# Patient Record
Sex: FEMALE | Race: WHITE | NOT HISPANIC OR LATINO
[De-identification: names, ages, dates, MRNs, and addresses within clinical notes are randomized per-mention and may not be internally consistent; named-entity substitution may affect disease eponyms.]

---

## 2017-03-31 ENCOUNTER — APPOINTMENT (OUTPATIENT)
Dept: GASTROENTEROLOGY | Facility: CLINIC | Age: 67
End: 2017-03-31

## 2017-03-31 VITALS
HEIGHT: 65 IN | DIASTOLIC BLOOD PRESSURE: 60 MMHG | OXYGEN SATURATION: 98 % | HEART RATE: 67 BPM | SYSTOLIC BLOOD PRESSURE: 110 MMHG | WEIGHT: 140 LBS | RESPIRATION RATE: 14 BRPM | BODY MASS INDEX: 23.32 KG/M2 | TEMPERATURE: 97.7 F

## 2017-08-09 ENCOUNTER — APPOINTMENT (OUTPATIENT)
Dept: GASTROENTEROLOGY | Facility: CLINIC | Age: 67
End: 2017-08-09
Payer: COMMERCIAL

## 2017-08-09 VITALS
OXYGEN SATURATION: 98 % | WEIGHT: 142 LBS | RESPIRATION RATE: 14 BRPM | HEIGHT: 65 IN | BODY MASS INDEX: 23.66 KG/M2 | SYSTOLIC BLOOD PRESSURE: 122 MMHG | HEART RATE: 68 BPM | DIASTOLIC BLOOD PRESSURE: 90 MMHG | TEMPERATURE: 98.4 F

## 2017-08-09 PROCEDURE — 36415 COLL VENOUS BLD VENIPUNCTURE: CPT

## 2017-08-09 PROCEDURE — 99214 OFFICE O/P EST MOD 30 MIN: CPT | Mod: 25

## 2017-08-11 LAB
25(OH)D3 SERPL-MCNC: 33.7 NG/ML
ALBUMIN SERPL ELPH-MCNC: 4.3 G/DL
ALP BLD-CCNC: 45 U/L
ALT SERPL-CCNC: 34 U/L
ANION GAP SERPL CALC-SCNC: 12 MMOL/L
AST SERPL-CCNC: 31 U/L
BASOPHILS # BLD AUTO: 0.04 K/UL
BASOPHILS NFR BLD AUTO: 0.6 %
BILIRUB SERPL-MCNC: 1 MG/DL
BUN SERPL-MCNC: 16 MG/DL
CALCIUM SERPL-MCNC: 9.4 MG/DL
CHLORIDE SERPL-SCNC: 99 MMOL/L
CO2 SERPL-SCNC: 29 MMOL/L
CREAT SERPL-MCNC: 0.98 MG/DL
CRP SERPL-MCNC: <0.2 MG/DL
ENDOMYSIUM IGA SER QL: NORMAL
ENDOMYSIUM IGA TITR SER: NORMAL
EOSINOPHIL # BLD AUTO: 0.15 K/UL
EOSINOPHIL NFR BLD AUTO: 2.4 %
GLIADIN IGA SER QL: <5 UNITS
GLIADIN IGG SER QL: <5 UNITS
GLIADIN PEPTIDE IGA SER-ACNC: NEGATIVE
GLIADIN PEPTIDE IGG SER-ACNC: NEGATIVE
GLUCOSE SERPL-MCNC: 100 MG/DL
HCT VFR BLD CALC: 39.7 %
HGB BLD-MCNC: 13.1 G/DL
IGA SER QL IEP: 60 MG/DL
IMM GRANULOCYTES NFR BLD AUTO: 0.2 %
LYMPHOCYTES # BLD AUTO: 2.16 K/UL
LYMPHOCYTES NFR BLD AUTO: 34.8 %
MAN DIFF?: NORMAL
MCHC RBC-ENTMCNC: 31.6 PG
MCHC RBC-ENTMCNC: 33 GM/DL
MCV RBC AUTO: 95.9 FL
MONOCYTES # BLD AUTO: 0.4 K/UL
MONOCYTES NFR BLD AUTO: 6.5 %
NEUTROPHILS # BLD AUTO: 3.44 K/UL
NEUTROPHILS NFR BLD AUTO: 55.5 %
PLATELET # BLD AUTO: 343 K/UL
POTASSIUM SERPL-SCNC: 5 MMOL/L
PROT SERPL-MCNC: 7.6 G/DL
RBC # BLD: 4.14 M/UL
RBC # FLD: 14.6 %
SODIUM SERPL-SCNC: 140 MMOL/L
T4 FREE SERPL-MCNC: 0.9 NG/DL
TRYPSINOGEN SERPL-MCNC: 626 NG/ML
TSH SERPL-ACNC: 4.43 UIU/ML
TTG IGA SER IA-ACNC: <5 UNITS
TTG IGA SER-ACNC: NEGATIVE
VIT B12 SERPL-MCNC: >2000 PG/ML
WBC # FLD AUTO: 6.2 K/UL

## 2017-10-20 ENCOUNTER — OTHER (OUTPATIENT)
Age: 67
End: 2017-10-20

## 2017-10-20 ENCOUNTER — APPOINTMENT (OUTPATIENT)
Dept: RADIOLOGY | Facility: IMAGING CENTER | Age: 67
End: 2017-10-20
Payer: COMMERCIAL

## 2017-10-20 ENCOUNTER — APPOINTMENT (OUTPATIENT)
Dept: MAMMOGRAPHY | Facility: IMAGING CENTER | Age: 67
End: 2017-10-20
Payer: COMMERCIAL

## 2017-10-20 ENCOUNTER — OUTPATIENT (OUTPATIENT)
Dept: OUTPATIENT SERVICES | Facility: HOSPITAL | Age: 67
LOS: 1 days | End: 2017-10-20
Payer: COMMERCIAL

## 2017-10-20 DIAGNOSIS — Z00.8 ENCOUNTER FOR OTHER GENERAL EXAMINATION: ICD-10-CM

## 2017-10-20 PROCEDURE — 77080 DXA BONE DENSITY AXIAL: CPT | Mod: 26

## 2017-10-20 PROCEDURE — 77067 SCR MAMMO BI INCL CAD: CPT

## 2017-10-20 PROCEDURE — 77063 BREAST TOMOSYNTHESIS BI: CPT

## 2017-10-20 PROCEDURE — 77080 DXA BONE DENSITY AXIAL: CPT

## 2017-10-20 PROCEDURE — 77063 BREAST TOMOSYNTHESIS BI: CPT | Mod: 26

## 2017-10-20 PROCEDURE — G0202: CPT | Mod: 26

## 2017-10-27 ENCOUNTER — OTHER (OUTPATIENT)
Age: 67
End: 2017-10-27

## 2017-10-27 ENCOUNTER — APPOINTMENT (OUTPATIENT)
Dept: MAMMOGRAPHY | Facility: IMAGING CENTER | Age: 67
End: 2017-10-27
Payer: COMMERCIAL

## 2017-10-27 ENCOUNTER — OUTPATIENT (OUTPATIENT)
Dept: OUTPATIENT SERVICES | Facility: HOSPITAL | Age: 67
LOS: 1 days | End: 2017-10-27
Payer: COMMERCIAL

## 2017-10-27 DIAGNOSIS — Z00.8 ENCOUNTER FOR OTHER GENERAL EXAMINATION: ICD-10-CM

## 2017-10-27 PROCEDURE — G0206: CPT | Mod: 26,RT

## 2017-10-27 PROCEDURE — G0279: CPT

## 2017-10-27 PROCEDURE — 77065 DX MAMMO INCL CAD UNI: CPT

## 2017-10-27 PROCEDURE — G0279: CPT | Mod: 26

## 2017-11-10 ENCOUNTER — OUTPATIENT (OUTPATIENT)
Dept: OUTPATIENT SERVICES | Facility: HOSPITAL | Age: 67
LOS: 1 days | End: 2017-11-10
Payer: COMMERCIAL

## 2017-11-10 ENCOUNTER — RESULT REVIEW (OUTPATIENT)
Age: 67
End: 2017-11-10

## 2017-11-10 ENCOUNTER — APPOINTMENT (OUTPATIENT)
Dept: MAMMOGRAPHY | Facility: IMAGING CENTER | Age: 67
End: 2017-11-10
Payer: COMMERCIAL

## 2017-11-10 DIAGNOSIS — R92.1 MAMMOGRAPHIC CALCIFICATION FOUND ON DIAGNOSTIC IMAGING OF BREAST: ICD-10-CM

## 2017-11-10 PROCEDURE — 77065 DX MAMMO INCL CAD UNI: CPT

## 2017-11-10 PROCEDURE — 19081 BX BREAST 1ST LESION STRTCTC: CPT | Mod: RT

## 2017-11-10 PROCEDURE — 88305 TISSUE EXAM BY PATHOLOGIST: CPT

## 2017-11-10 PROCEDURE — A4648: CPT

## 2017-11-10 PROCEDURE — 88360 TUMOR IMMUNOHISTOCHEM/MANUAL: CPT

## 2017-11-10 PROCEDURE — 88305 TISSUE EXAM BY PATHOLOGIST: CPT | Mod: 26

## 2017-11-10 PROCEDURE — 88360 TUMOR IMMUNOHISTOCHEM/MANUAL: CPT | Mod: 26

## 2017-11-10 PROCEDURE — G0206: CPT | Mod: 26,RT

## 2017-11-10 PROCEDURE — 19081 BX BREAST 1ST LESION STRTCTC: CPT

## 2017-11-13 LAB — SURGICAL PATHOLOGY STUDY: SIGNIFICANT CHANGE UP

## 2017-11-21 ENCOUNTER — APPOINTMENT (OUTPATIENT)
Dept: MRI IMAGING | Facility: IMAGING CENTER | Age: 67
End: 2017-11-21
Payer: COMMERCIAL

## 2017-11-21 ENCOUNTER — OUTPATIENT (OUTPATIENT)
Dept: OUTPATIENT SERVICES | Facility: HOSPITAL | Age: 67
LOS: 1 days | End: 2017-11-21
Payer: COMMERCIAL

## 2017-11-21 DIAGNOSIS — Z00.8 ENCOUNTER FOR OTHER GENERAL EXAMINATION: ICD-10-CM

## 2017-11-21 PROCEDURE — A9585: CPT

## 2017-11-21 PROCEDURE — 0159T: CPT | Mod: 26

## 2017-11-21 PROCEDURE — 77059 MRI BREAST BILATERAL: CPT | Mod: 26

## 2017-11-21 PROCEDURE — C8908: CPT

## 2017-11-21 PROCEDURE — C8937: CPT

## 2017-11-22 ENCOUNTER — ASOB RESULT (OUTPATIENT)
Age: 67
End: 2017-11-22

## 2017-11-22 ENCOUNTER — APPOINTMENT (OUTPATIENT)
Dept: OBGYN | Facility: CLINIC | Age: 67
End: 2017-11-22
Payer: COMMERCIAL

## 2017-11-22 VITALS
SYSTOLIC BLOOD PRESSURE: 123 MMHG | WEIGHT: 135 LBS | HEIGHT: 65 IN | DIASTOLIC BLOOD PRESSURE: 82 MMHG | HEART RATE: 80 BPM | BODY MASS INDEX: 22.49 KG/M2

## 2017-11-22 PROCEDURE — 76830 TRANSVAGINAL US NON-OB: CPT

## 2017-11-22 PROCEDURE — 99397 PER PM REEVAL EST PAT 65+ YR: CPT

## 2017-11-28 ENCOUNTER — APPOINTMENT (OUTPATIENT)
Dept: CARDIOLOGY | Facility: CLINIC | Age: 67
End: 2017-11-28
Payer: COMMERCIAL

## 2017-11-28 ENCOUNTER — APPOINTMENT (OUTPATIENT)
Dept: MAMMOGRAPHY | Facility: IMAGING CENTER | Age: 67
End: 2017-11-28
Payer: COMMERCIAL

## 2017-11-28 ENCOUNTER — OUTPATIENT (OUTPATIENT)
Dept: OUTPATIENT SERVICES | Facility: HOSPITAL | Age: 67
LOS: 1 days | End: 2017-11-28
Payer: COMMERCIAL

## 2017-11-28 VITALS
TEMPERATURE: 98 F | DIASTOLIC BLOOD PRESSURE: 82 MMHG | HEIGHT: 64.5 IN | WEIGHT: 134.04 LBS | HEART RATE: 75 BPM | SYSTOLIC BLOOD PRESSURE: 118 MMHG | RESPIRATION RATE: 16 BRPM

## 2017-11-28 VITALS
DIASTOLIC BLOOD PRESSURE: 72 MMHG | OXYGEN SATURATION: 96 % | BODY MASS INDEX: 22.63 KG/M2 | SYSTOLIC BLOOD PRESSURE: 123 MMHG | HEART RATE: 91 BPM | WEIGHT: 136 LBS

## 2017-11-28 DIAGNOSIS — J45.998 OTHER ASTHMA: ICD-10-CM

## 2017-11-28 DIAGNOSIS — C50.419 MALIGNANT NEOPLASM OF UPPER-OUTER QUADRANT OF UNSPECIFIED FEMALE BREAST: ICD-10-CM

## 2017-11-28 DIAGNOSIS — Z12.11 ENCOUNTER FOR SCREENING FOR MALIGNANT NEOPLASM OF COLON: ICD-10-CM

## 2017-11-28 DIAGNOSIS — Z80.8 FAMILY HISTORY OF MALIGNANT NEOPLASM OF OTHER ORGANS OR SYSTEMS: ICD-10-CM

## 2017-11-28 DIAGNOSIS — I47.2 VENTRICULAR TACHYCARDIA: ICD-10-CM

## 2017-11-28 DIAGNOSIS — Z87.898 PERSONAL HISTORY OF OTHER SPECIFIED CONDITIONS: ICD-10-CM

## 2017-11-28 DIAGNOSIS — J00 ACUTE NASOPHARYNGITIS [COMMON COLD]: ICD-10-CM

## 2017-11-28 DIAGNOSIS — Z90.49 ACQUIRED ABSENCE OF OTHER SPECIFIED PARTS OF DIGESTIVE TRACT: Chronic | ICD-10-CM

## 2017-11-28 DIAGNOSIS — E03.9 HYPOTHYROIDISM, UNSPECIFIED: ICD-10-CM

## 2017-11-28 DIAGNOSIS — Z87.440 PERSONAL HISTORY OF URINARY (TRACT) INFECTIONS: ICD-10-CM

## 2017-11-28 DIAGNOSIS — R92.1 MAMMOGRAPHIC CALCIFICATION FOUND ON DIAGNOSTIC IMAGING OF BREAST: ICD-10-CM

## 2017-11-28 LAB
BUN SERPL-MCNC: 15 MG/DL — SIGNIFICANT CHANGE UP (ref 7–23)
CALCIUM SERPL-MCNC: 8.9 MG/DL — SIGNIFICANT CHANGE UP (ref 8.4–10.5)
CHLORIDE SERPL-SCNC: 104 MMOL/L — SIGNIFICANT CHANGE UP (ref 98–107)
CO2 SERPL-SCNC: 27 MMOL/L — SIGNIFICANT CHANGE UP (ref 22–31)
CREAT SERPL-MCNC: 0.92 MG/DL — SIGNIFICANT CHANGE UP (ref 0.5–1.3)
GLUCOSE SERPL-MCNC: 91 MG/DL — SIGNIFICANT CHANGE UP (ref 70–99)
HCT VFR BLD CALC: 40.2 % — SIGNIFICANT CHANGE UP (ref 34.5–45)
HGB BLD-MCNC: 13.3 G/DL — SIGNIFICANT CHANGE UP (ref 11.5–15.5)
MCHC RBC-ENTMCNC: 31.3 PG — SIGNIFICANT CHANGE UP (ref 27–34)
MCHC RBC-ENTMCNC: 33.1 % — SIGNIFICANT CHANGE UP (ref 32–36)
MCV RBC AUTO: 94.6 FL — SIGNIFICANT CHANGE UP (ref 80–100)
NRBC # FLD: 0 — SIGNIFICANT CHANGE UP
PLATELET # BLD AUTO: 296 K/UL — SIGNIFICANT CHANGE UP (ref 150–400)
PMV BLD: 10.6 FL — SIGNIFICANT CHANGE UP (ref 7–13)
POTASSIUM SERPL-MCNC: 4.5 MMOL/L — SIGNIFICANT CHANGE UP (ref 3.5–5.3)
POTASSIUM SERPL-SCNC: 4.5 MMOL/L — SIGNIFICANT CHANGE UP (ref 3.5–5.3)
RBC # BLD: 4.25 M/UL — SIGNIFICANT CHANGE UP (ref 3.8–5.2)
RBC # FLD: 13.2 % — SIGNIFICANT CHANGE UP (ref 10.3–14.5)
SODIUM SERPL-SCNC: 145 MMOL/L — SIGNIFICANT CHANGE UP (ref 135–145)
WBC # BLD: 8.19 K/UL — SIGNIFICANT CHANGE UP (ref 3.8–10.5)
WBC # FLD AUTO: 8.19 K/UL — SIGNIFICANT CHANGE UP (ref 3.8–10.5)

## 2017-11-28 PROCEDURE — 19281 PERQ DEVICE BREAST 1ST IMAG: CPT

## 2017-11-28 PROCEDURE — C1739: CPT

## 2017-11-28 PROCEDURE — 99214 OFFICE O/P EST MOD 30 MIN: CPT

## 2017-11-28 PROCEDURE — 19281 PERQ DEVICE BREAST 1ST IMAG: CPT | Mod: RT

## 2017-11-28 PROCEDURE — 93010 ELECTROCARDIOGRAM REPORT: CPT

## 2017-11-28 RX ORDER — NEOMYCIN SULFATE, POLYMYXIN B SULFATE AND DEXAMETHASONE 3.5; 10000; 1 MG/ML; [USP'U]/ML; MG/ML
3.5-10000-0.1 SUSPENSION OPHTHALMIC
Qty: 5 | Refills: 0 | Status: COMPLETED | COMMUNITY
Start: 2017-11-21

## 2017-11-28 RX ORDER — CHOLESTYRAMINE 4 G/9G
4 POWDER, FOR SUSPENSION ORAL
Qty: 30 | Refills: 1 | Status: DISCONTINUED | COMMUNITY
Start: 2017-03-31 | End: 2017-11-28

## 2017-11-28 RX ORDER — OFLOXACIN 3 MG/ML
0.3 SOLUTION/ DROPS OPHTHALMIC
Qty: 5 | Refills: 0 | Status: COMPLETED | COMMUNITY
Start: 2017-10-27

## 2017-11-28 NOTE — H&P PST ADULT - RS GEN PE MLT RESP DETAILS PC
clear to auscultation bilaterally/no intercostal retractions/no chest wall tenderness/respirations non-labored/airway patent/breath sounds equal/good air movement

## 2017-11-28 NOTE — H&P PST ADULT - NSANTHOSAYNRD_GEN_A_CORE
No. MAEGAN screening performed.  STOP BANG Legend: 0-2 = LOW Risk; 3-4 = INTERMEDIATE Risk; 5-8 = HIGH Risk

## 2017-11-28 NOTE — H&P PST ADULT - PMH
Angiomyolipoma  of Kidney  Arrhythmia    Cholelithiasis    Eye disorder  clooged eye duct of left eye  h/o Fibroid Uterus    h/o herniated disc Lumbar    h/o Ventricular Tachycardia 2002    Hypothyroidism  hashimoto's thyroiditis  Mild asthma, unspecified whether complicated, unspecified whether persistent  only with cold symptoms  Pneumonia  8 years ago  Thyroid Nodule Angiomyolipoma  of Kidney  Arrhythmia    Cholelithiasis    Eye disorder  clogged eye duct of left eye  h/o Fibroid Uterus    h/o herniated disc Lumbar    h/o Ventricular Tachycardia 2002    Hypothyroidism  hashimoto's thyroiditis  Malignant neoplasm of upper-outer quadrant of breast  right  Mild asthma, unspecified whether complicated, unspecified whether persistent  only with cold symptoms  Pneumonia  8 years ago  Thyroid Nodule

## 2017-11-28 NOTE — H&P PST ADULT - OTHER CARE PROVIDERS
Endocrinologist Dr. Chanda Cantor [phone] Endocrinologist Dr. Chanda Cantor [phone] Cardiologist Endocrinologist Dr. Chanda Cantor [phone] Cardiologist Dr. Aleks Rocha [phone]

## 2017-11-28 NOTE — H&P PST ADULT - PROBLEM SELECTOR PLAN 4
Pt states for the last 2 days she feels congested and not well. Informed she needs to see her PCP again for assessment and possible treatment before surgery.

## 2017-11-28 NOTE — H&P PST ADULT - ANESTHESIA, PREVIOUS REACTION, PROFILE
ventricular Tachycardia while in recovery in 2002 after hysterectomy at [facility]. denies Fhx of anesthesia complications/heart complications/nausea/vomiting

## 2017-11-28 NOTE — H&P PST ADULT - RS GEN HX ROS MEA POS PC
x 2 days. Pt reports recent chest congestion and feels like she is coming down with a cold./cough x 2 days. Pt also reports recent chest congestion and feels like she is coming down with a cold./cough

## 2017-11-28 NOTE — H&P PST ADULT - FAMILY HISTORY
Mother  Still living? Unknown  Family history of colon cancer in mother, Age at diagnosis: Age Unknown     Sibling  Still living? Unknown  Family history of malignant neoplasm of endometrium, Age at diagnosis: Age Unknown  Family history of prostate cancer, Age at diagnosis: Age Unknown  Family history of thyroid cancer, Age at diagnosis: Age Unknown

## 2017-11-28 NOTE — H&P PST ADULT - NEGATIVE BREAST SYMPTOMS
no breast tenderness L/no breast tenderness R/no breast lump L/no nipple discharge L/no nipple discharge R no nipple discharge L/no breast lump L/no nipple discharge R/no breast tenderness L

## 2017-11-28 NOTE — H&P PST ADULT - RESPIRATORY AND THORAX COMMENTS
h/o mild Asthma. denies recent exacerbation. Last use of Proventil inhaler was 1 night ago for congestion h/o mild Asthma. denies recent exacerbation. Last use of Proventil inhaler was 1 night ago for chest congestion

## 2017-11-28 NOTE — H&P PST ADULT - MUSCULOSKELETAL
details… detailed exam ROM intact/normal strength/no joint erythema/no calf tenderness/no joint swelling

## 2017-11-28 NOTE — H&P PST ADULT - PROBLEM SELECTOR PLAN 1
Scheduled for Right MAMM-SAVISCOUT Lumpectomy on 12/1/2107.   Pre op instructions given, pt verbalized understanding   Chlorhexidine wash and GI prophylaxis provided   Pt states she was seen by her PCP on 11/24 for medical evaluation preop  Copy of evaluation note requested

## 2017-11-28 NOTE — H&P PST ADULT - NEGATIVE GENERAL SYMPTOMS
no sweating/no weight loss/no polyphagia/no polydipsia/no fever/no chills/no anorexia/no weight gain/no polyuria/no malaise

## 2017-11-28 NOTE — H&P PST ADULT - NEGATIVE NEUROLOGICAL SYMPTOMS
no vertigo/no difficulty walking/no loss of consciousness/no generalized seizures/no loss of sensation/no confusion/no tremors/no focal seizures/no syncope/no transient paralysis/no weakness/no paresthesias/no headache/no hemiparesis/no facial palsy

## 2017-11-28 NOTE — H&P PST ADULT - LYMPHATIC
posterior cervical L/anterior cervical L/posterior cervical R/supraclavicular L/anterior cervical R/axillary L/axillary R/supraclavicular R

## 2017-11-28 NOTE — H&P PST ADULT - PROBLEM SELECTOR PLAN 3
h/o Ventricular tachycardia in 2002 while in recovery post hysterectomy.   Informed PST Anesthesiologist Dr. Augustin of pt's history of V-Tach and occasional arrhythmia's  Cardiac evaluation requested preop. Pt said she will call her Cardiologist today for an appt h/o Ventricular tachycardia in 2002 while in recovery post hysterectomy.   Informed PST Anesthesiologist Dr. Augustin of pt's history of V-Tach and occasional arrhythmia's  Cardiac evaluation requested preop.   Pt has an appt today at 3pm to see her cardiologist

## 2017-11-28 NOTE — H&P PST ADULT - HISTORY OF PRESENT ILLNESS
68 y/o female with PMH of Hashimoto's Thyroiditis on Synthroid presents to PST for preoperative evaluation with diagnosis of malignant neoplasm of upper-outer quadrant of right breast. Pt reports she was diagnosed with DCIS of the right breast after her annual mammogram and biopsy. Scheduled for Right MAMM-SAVISCOUT Lumpectomy on 12/1/2107.

## 2017-12-15 ENCOUNTER — OUTPATIENT (OUTPATIENT)
Dept: OUTPATIENT SERVICES | Facility: HOSPITAL | Age: 67
LOS: 1 days | Discharge: HOME | End: 2017-12-15
Payer: COMMERCIAL

## 2017-12-15 ENCOUNTER — RESULT REVIEW (OUTPATIENT)
Age: 67
End: 2017-12-15

## 2017-12-15 VITALS
SYSTOLIC BLOOD PRESSURE: 131 MMHG | HEART RATE: 73 BPM | RESPIRATION RATE: 18 BRPM | TEMPERATURE: 98 F | DIASTOLIC BLOOD PRESSURE: 76 MMHG | WEIGHT: 134.04 LBS | HEIGHT: 64.5 IN | OXYGEN SATURATION: 98 %

## 2017-12-15 VITALS
HEART RATE: 72 BPM | SYSTOLIC BLOOD PRESSURE: 123 MMHG | DIASTOLIC BLOOD PRESSURE: 76 MMHG | OXYGEN SATURATION: 96 % | RESPIRATION RATE: 16 BRPM

## 2017-12-15 DIAGNOSIS — Z90.49 ACQUIRED ABSENCE OF OTHER SPECIFIED PARTS OF DIGESTIVE TRACT: Chronic | ICD-10-CM

## 2017-12-15 PROCEDURE — 88305 TISSUE EXAM BY PATHOLOGIST: CPT | Mod: 26

## 2017-12-15 PROCEDURE — 76098 X-RAY EXAM SURGICAL SPECIMEN: CPT | Mod: 26,GC

## 2017-12-15 RX ORDER — ACETAMINOPHEN 500 MG
650 TABLET ORAL EVERY 6 HOURS
Qty: 0 | Refills: 0 | Status: DISCONTINUED | OUTPATIENT
Start: 2017-12-15 | End: 2017-12-30

## 2017-12-15 RX ORDER — ACETAMINOPHEN 500 MG
2 TABLET ORAL
Qty: 0 | Refills: 0 | COMMUNITY
Start: 2017-12-15

## 2017-12-15 RX ORDER — SODIUM CHLORIDE 9 MG/ML
1000 INJECTION, SOLUTION INTRAVENOUS
Qty: 0 | Refills: 0 | Status: DISCONTINUED | OUTPATIENT
Start: 2017-12-15 | End: 2017-12-30

## 2017-12-15 RX ORDER — HYDROMORPHONE HYDROCHLORIDE 2 MG/ML
1 INJECTION INTRAMUSCULAR; INTRAVENOUS; SUBCUTANEOUS ONCE
Qty: 0 | Refills: 0 | Status: DISCONTINUED | OUTPATIENT
Start: 2017-12-15 | End: 2017-12-15

## 2017-12-15 RX ORDER — HYDROMORPHONE HYDROCHLORIDE 2 MG/ML
1 INJECTION INTRAMUSCULAR; INTRAVENOUS; SUBCUTANEOUS
Qty: 9 | Refills: 0 | OUTPATIENT
Start: 2017-12-15 | End: 2017-12-17

## 2017-12-15 RX ADMIN — HYDROMORPHONE HYDROCHLORIDE 1 MILLIGRAM(S): 2 INJECTION INTRAMUSCULAR; INTRAVENOUS; SUBCUTANEOUS at 14:55

## 2017-12-15 RX ADMIN — SODIUM CHLORIDE 30 MILLILITER(S): 9 INJECTION, SOLUTION INTRAVENOUS at 14:30

## 2017-12-15 NOTE — ASU DISCHARGE PLAN (ADULT/PEDIATRIC). - ASU FOLLOWUP
call  PACU  [phone] ( open  24 hour  and weekend ) , [phone] ( open 6AM to 11 PM  closed weekend)/ABIMAEL ASU (Adult):

## 2017-12-15 NOTE — ASU DISCHARGE PLAN (ADULT/PEDIATRIC). - ITEMS TO FOLLOWUP WITH YOUR PHYSICIAN'S
Please follow up with Dr. Garrido in 2 weeks for a post-operative check. You may call [phone] to schedule an appointment.

## 2017-12-15 NOTE — ASU DISCHARGE PLAN (ADULT/PEDIATRIC). - MEDICATION SUMMARY - MEDICATIONS TO TAKE
I will START or STAY ON the medications listed below when I get home from the hospital:    marylou eliud Dex  -- 1 drop(s) by mouth 2 times a day  -- Indication: For home medication    acetaminophen 325 mg oral tablet  -- 2 tab(s) by mouth every 6 hours, As needed, Mild Pain (1 - 3)  -- Indication: For mild to moderate pain    Dilaudid 2 mg oral tablet  -- 1 tab(s) by mouth every 8 hours MDD:2  -- Caution federal law prohibits the transfer of this drug to any person other  than the person for whom it was prescribed.  May cause drowsiness.  Alcohol may intensify this effect.  Use care when operating dangerous machinery.  This prescription cannot be refilled.  Using more of this medication than prescribed may cause serious breathing problems.    -- Indication: For severe pain    Proventil HFA 90 mcg/inh inhalation aerosol  -- when sick  -- Indication: For home medication    Symbicort 160 mcg-4.5 mcg/inh inhalation aerosol  -- 2 puff(s) inhaled 2 times a day  -- Indication: For home medication    Synthroid 25 mcg (0.025 mg) oral tablet  -- 1 tab(s) by mouth once a day in AM. Only on Mon,Tues, Wed, Friday, Sat    Thursday and sunday's pt takes three quaters of her pill  -- Indication: For home medication

## 2017-12-15 NOTE — BRIEF OPERATIVE NOTE - OPERATION/FINDINGS
Right breast lesion was localized with steven .  Xray confirmed specimen contained both  and clip.  Superior, lateral, medial, and inferior margins were obtained and sent to pathology as well.

## 2017-12-15 NOTE — ASU DISCHARGE PLAN (ADULT/PEDIATRIC). - SPECIAL INSTRUCTIONS
Do not take pain medication on an empty stomach.  Increase fluids and fiber in diet to prevent constipation.   Wear bra for comfort and support (24/7). Use ice to reduce pain and swelling. Remove dressing as instructed by your doctor. Leave steri strips intact.

## 2017-12-19 LAB — SURGICAL PATHOLOGY STUDY: SIGNIFICANT CHANGE UP

## 2017-12-26 ENCOUNTER — OUTPATIENT (OUTPATIENT)
Dept: OUTPATIENT SERVICES | Facility: HOSPITAL | Age: 67
LOS: 1 days | Discharge: HOME | End: 2017-12-26

## 2017-12-26 DIAGNOSIS — Z90.49 ACQUIRED ABSENCE OF OTHER SPECIFIED PARTS OF DIGESTIVE TRACT: Chronic | ICD-10-CM

## 2017-12-27 ENCOUNTER — APPOINTMENT (OUTPATIENT)
Dept: RADIATION ONCOLOGY | Facility: CLINIC | Age: 67
End: 2017-12-27
Payer: COMMERCIAL

## 2017-12-27 VITALS
TEMPERATURE: 98.2 F | RESPIRATION RATE: 14 BRPM | WEIGHT: 135.47 LBS | SYSTOLIC BLOOD PRESSURE: 115 MMHG | HEART RATE: 88 BPM | DIASTOLIC BLOOD PRESSURE: 76 MMHG | HEIGHT: 65 IN | OXYGEN SATURATION: 95 % | BODY MASS INDEX: 22.57 KG/M2

## 2017-12-27 PROCEDURE — 77263 THER RADIOLOGY TX PLNG CPLX: CPT

## 2017-12-27 PROCEDURE — 99204 OFFICE O/P NEW MOD 45 MIN: CPT | Mod: 25

## 2017-12-27 RX ORDER — BUDESONIDE AND FORMOTEROL FUMARATE DIHYDRATE 160; 4.5 UG/1; UG/1
160-4.5 AEROSOL RESPIRATORY (INHALATION)
Qty: 10 | Refills: 0 | Status: DISCONTINUED | COMMUNITY
Start: 2017-12-05

## 2017-12-27 RX ORDER — AZITHROMYCIN 250 MG/1
250 TABLET, FILM COATED ORAL
Qty: 6 | Refills: 0 | Status: DISCONTINUED | COMMUNITY
Start: 2017-12-05

## 2017-12-27 RX ORDER — ALBUTEROL SULFATE 108 UG/1
108 (90 BASE) AEROSOL, METERED RESPIRATORY (INHALATION)
Qty: 7 | Refills: 0 | Status: DISCONTINUED | COMMUNITY
Start: 2017-11-28

## 2017-12-27 RX ORDER — HYDROMORPHONE HYDROCHLORIDE 2 MG/1
2 TABLET ORAL
Qty: 9 | Refills: 0 | Status: DISCONTINUED | COMMUNITY
Start: 2017-12-15

## 2018-01-02 ENCOUNTER — TRANSCRIPTION ENCOUNTER (OUTPATIENT)
Age: 68
End: 2018-01-02

## 2018-02-05 PROCEDURE — 77334 RADIATION TREATMENT AID(S): CPT | Mod: 26

## 2018-02-05 PROCEDURE — 77290 THER RAD SIMULAJ FIELD CPLX: CPT | Mod: 26

## 2018-02-09 PROCEDURE — 77295 3-D RADIOTHERAPY PLAN: CPT | Mod: 26

## 2018-02-09 PROCEDURE — 77300 RADIATION THERAPY DOSE PLAN: CPT | Mod: 26

## 2018-02-09 PROCEDURE — 77334 RADIATION TREATMENT AID(S): CPT | Mod: 26

## 2018-02-14 PROCEDURE — 77280 THER RAD SIMULAJ FIELD SMPL: CPT | Mod: 26

## 2018-02-22 PROCEDURE — 77427 RADIATION TX MANAGEMENT X5: CPT

## 2018-02-27 VITALS
DIASTOLIC BLOOD PRESSURE: 79 MMHG | RESPIRATION RATE: 16 BRPM | HEIGHT: 65 IN | WEIGHT: 135.91 LBS | OXYGEN SATURATION: 97 % | BODY MASS INDEX: 22.64 KG/M2 | HEART RATE: 77 BPM | TEMPERATURE: 97.8 F | SYSTOLIC BLOOD PRESSURE: 132 MMHG

## 2018-03-01 PROCEDURE — 77427 RADIATION TX MANAGEMENT X5: CPT

## 2018-03-09 PROCEDURE — 77427 RADIATION TX MANAGEMENT X5: CPT

## 2018-04-20 ENCOUNTER — APPOINTMENT (OUTPATIENT)
Dept: RADIATION ONCOLOGY | Facility: CLINIC | Age: 68
End: 2018-04-20
Payer: COMMERCIAL

## 2018-04-20 VITALS
WEIGHT: 138.45 LBS | SYSTOLIC BLOOD PRESSURE: 124 MMHG | OXYGEN SATURATION: 98 % | TEMPERATURE: 97.7 F | DIASTOLIC BLOOD PRESSURE: 83 MMHG | HEART RATE: 74 BPM | RESPIRATION RATE: 14 BRPM | BODY MASS INDEX: 23.04 KG/M2

## 2018-04-20 PROCEDURE — 99024 POSTOP FOLLOW-UP VISIT: CPT

## 2018-07-06 ENCOUNTER — OUTPATIENT (OUTPATIENT)
Dept: OUTPATIENT SERVICES | Facility: HOSPITAL | Age: 68
LOS: 1 days | End: 2018-07-06
Payer: MEDICARE

## 2018-07-06 ENCOUNTER — APPOINTMENT (OUTPATIENT)
Dept: MRI IMAGING | Facility: IMAGING CENTER | Age: 68
End: 2018-07-06
Payer: MEDICARE

## 2018-07-06 DIAGNOSIS — Z00.8 ENCOUNTER FOR OTHER GENERAL EXAMINATION: ICD-10-CM

## 2018-07-06 DIAGNOSIS — Z90.49 ACQUIRED ABSENCE OF OTHER SPECIFIED PARTS OF DIGESTIVE TRACT: Chronic | ICD-10-CM

## 2018-07-06 PROCEDURE — C8937: CPT

## 2018-07-06 PROCEDURE — 77059 MRI BREAST BILATERAL: CPT | Mod: 26

## 2018-07-06 PROCEDURE — 82565 ASSAY OF CREATININE: CPT

## 2018-07-06 PROCEDURE — 0159T: CPT | Mod: 26

## 2018-07-06 PROCEDURE — C8908: CPT

## 2018-07-06 PROCEDURE — A9585: CPT

## 2018-07-16 PROBLEM — H57.9 UNSPECIFIED DISORDER OF EYE AND ADNEXA: Chronic | Status: ACTIVE | Noted: 2017-11-28

## 2018-07-17 PROBLEM — I49.9 CARDIAC ARRHYTHMIA, UNSPECIFIED: Chronic | Status: ACTIVE | Noted: 2017-11-28

## 2018-07-17 PROBLEM — J45.998 OTHER ASTHMA: Chronic | Status: ACTIVE | Noted: 2017-11-28

## 2018-07-17 PROBLEM — D17.9 BENIGN LIPOMATOUS NEOPLASM, UNSPECIFIED: Chronic | Status: ACTIVE | Noted: 2017-11-28

## 2018-09-07 ENCOUNTER — APPOINTMENT (OUTPATIENT)
Dept: MAMMOGRAPHY | Facility: IMAGING CENTER | Age: 68
End: 2018-09-07
Payer: MEDICARE

## 2018-09-07 ENCOUNTER — OUTPATIENT (OUTPATIENT)
Dept: OUTPATIENT SERVICES | Facility: HOSPITAL | Age: 68
LOS: 1 days | End: 2018-09-07
Payer: MEDICARE

## 2018-09-07 ENCOUNTER — APPOINTMENT (OUTPATIENT)
Dept: ULTRASOUND IMAGING | Facility: IMAGING CENTER | Age: 68
End: 2018-09-07
Payer: MEDICARE

## 2018-09-07 ENCOUNTER — OTHER (OUTPATIENT)
Age: 68
End: 2018-09-07

## 2018-09-07 DIAGNOSIS — Z90.49 ACQUIRED ABSENCE OF OTHER SPECIFIED PARTS OF DIGESTIVE TRACT: Chronic | ICD-10-CM

## 2018-09-07 DIAGNOSIS — Z00.8 ENCOUNTER FOR OTHER GENERAL EXAMINATION: ICD-10-CM

## 2018-09-07 PROCEDURE — 76641 ULTRASOUND BREAST COMPLETE: CPT | Mod: 26,50

## 2018-09-07 PROCEDURE — 77066 DX MAMMO INCL CAD BI: CPT | Mod: 26

## 2018-09-07 PROCEDURE — G0279: CPT

## 2018-09-07 PROCEDURE — 76641 ULTRASOUND BREAST COMPLETE: CPT

## 2018-09-07 PROCEDURE — G0279: CPT | Mod: 26

## 2018-09-07 PROCEDURE — 77066 DX MAMMO INCL CAD BI: CPT

## 2018-10-16 ENCOUNTER — APPOINTMENT (OUTPATIENT)
Dept: GASTROENTEROLOGY | Facility: CLINIC | Age: 68
End: 2018-10-16
Payer: MEDICARE

## 2018-10-16 VITALS
OXYGEN SATURATION: 98 % | WEIGHT: 135 LBS | BODY MASS INDEX: 21.69 KG/M2 | SYSTOLIC BLOOD PRESSURE: 120 MMHG | DIASTOLIC BLOOD PRESSURE: 80 MMHG | TEMPERATURE: 208.76 F | HEART RATE: 81 BPM | RESPIRATION RATE: 15 BRPM | HEIGHT: 66 IN

## 2018-10-16 PROCEDURE — 99214 OFFICE O/P EST MOD 30 MIN: CPT

## 2018-10-16 RX ORDER — POLYETHYLENE GLYCOL 3350, SODIUM SULFATE, SODIUM CHLORIDE, POTASSIUM CHLORIDE, ASCORBIC ACID, SODIUM ASCORBATE 7.5-2.691G
100 KIT ORAL
Qty: 1 | Refills: 0 | Status: DISCONTINUED | COMMUNITY
Start: 2018-09-07 | End: 2018-10-16

## 2018-11-26 ENCOUNTER — APPOINTMENT (OUTPATIENT)
Dept: OBGYN | Facility: CLINIC | Age: 68
End: 2018-11-26
Payer: MEDICARE

## 2018-11-26 VITALS
BODY MASS INDEX: 21.69 KG/M2 | WEIGHT: 135 LBS | HEIGHT: 66 IN | DIASTOLIC BLOOD PRESSURE: 70 MMHG | SYSTOLIC BLOOD PRESSURE: 125 MMHG | HEART RATE: 80 BPM

## 2018-11-26 PROCEDURE — G0101: CPT

## 2018-11-26 PROCEDURE — 99214 OFFICE O/P EST MOD 30 MIN: CPT | Mod: 25

## 2019-01-03 ENCOUNTER — OTHER (OUTPATIENT)
Age: 69
End: 2019-01-03

## 2019-01-11 ENCOUNTER — APPOINTMENT (OUTPATIENT)
Dept: UROLOGY | Facility: CLINIC | Age: 69
End: 2019-01-11
Payer: MEDICARE

## 2019-01-11 ENCOUNTER — OUTPATIENT (OUTPATIENT)
Dept: OUTPATIENT SERVICES | Facility: HOSPITAL | Age: 69
LOS: 1 days | End: 2019-01-11
Payer: MEDICARE

## 2019-01-11 VITALS
RESPIRATION RATE: 17 BRPM | HEART RATE: 83 BPM | BODY MASS INDEX: 20.57 KG/M2 | HEIGHT: 66 IN | WEIGHT: 128 LBS | DIASTOLIC BLOOD PRESSURE: 83 MMHG | SYSTOLIC BLOOD PRESSURE: 128 MMHG | TEMPERATURE: 209.48 F

## 2019-01-11 DIAGNOSIS — Z90.49 ACQUIRED ABSENCE OF OTHER SPECIFIED PARTS OF DIGESTIVE TRACT: Chronic | ICD-10-CM

## 2019-01-11 PROCEDURE — 76775 US EXAM ABDO BACK WALL LIM: CPT | Mod: 26

## 2019-01-11 PROCEDURE — 76775 US EXAM ABDO BACK WALL LIM: CPT

## 2019-01-11 PROCEDURE — 99213 OFFICE O/P EST LOW 20 MIN: CPT | Mod: 25

## 2019-01-11 NOTE — HISTORY OF PRESENT ILLNESS
[FreeTextEntry1] : This is a 68 year old female with a hx of AML dx in 2011 presenting for surveillance sonogram.  No flank pain, no hematuria.  No fevers or chills, no nausea or vomiting.  \par \par From a urinary standpoint, she endorses incomplete emptying and occasional weak stream but denies dysuria, urgency/frequency.  She has nocturia x 1-5 depending on how much water/tea she drinks at night.\par \par In the past year, she was diagnosed with DCIS of the breast and underwent lumpectomy with adjuvant XRT.  She developed multiple bouts of pneumonia for which she required prolonged antibiotic therapy.  \par \par

## 2019-01-11 NOTE — REVIEW OF SYSTEMS
[Feeling Tired] : feeling tired [Dry Eyes] : dryness of the eyes [Palpitations] : palpitations [Shortness Of Breath] : shortness of breath [Wheezing] : wheezing [Cough] : cough [Diarrhea] : diarrhea [see HPI] : see HPI [Date of last menstrual period ____] : date of last menstrual period: [unfilled] [Abnormal menstrual period, if yes explain ___] : abnormal menstrual period [unfilled] [Told you have blood in urine on a urine test] : told blood was present in a urine test [Urine retention] : urine retention [Wake up at night to urinate  How many times?  ___] : wakes up to urinate [unfilled] times during the night [Strong urge to urinate] : strong urge to urinate [Bladder pressure] : experiences bladder pressure [Strain or push to urinate] : strain or push to urinate [Wait a long time to urinate] : waits a long time to urinate [Slow urine stream] : slow urine stream [Joint Pain] : joint pain [Joint Swelling] : joint swelling [Negative] : Heme/Lymph

## 2019-01-11 NOTE — ASSESSMENT
[FreeTextEntry1] : This is a 68 year old female presenting for surveillance for an AML of the left kidney\par \par - ultrasound today shows ***\par - double void and timed void to encourage bladder emptying, avoid heavy fluid intake at bedtime/overnight.

## 2019-01-11 NOTE — PHYSICAL EXAM

## 2019-01-14 DIAGNOSIS — D17.71 BENIGN LIPOMATOUS NEOPLASM OF KIDNEY: ICD-10-CM

## 2019-03-12 ENCOUNTER — NON-APPOINTMENT (OUTPATIENT)
Age: 69
End: 2019-03-12

## 2019-03-12 ENCOUNTER — APPOINTMENT (OUTPATIENT)
Dept: CARDIOLOGY | Facility: CLINIC | Age: 69
End: 2019-03-12
Payer: MEDICARE

## 2019-03-12 VITALS
DIASTOLIC BLOOD PRESSURE: 79 MMHG | WEIGHT: 131 LBS | HEIGHT: 66 IN | SYSTOLIC BLOOD PRESSURE: 126 MMHG | BODY MASS INDEX: 21.05 KG/M2 | OXYGEN SATURATION: 96 % | HEART RATE: 77 BPM

## 2019-03-12 DIAGNOSIS — G47.69 OTHER SLEEP RELATED MOVEMENT DISORDERS: ICD-10-CM

## 2019-03-12 PROCEDURE — 93000 ELECTROCARDIOGRAM COMPLETE: CPT

## 2019-03-12 PROCEDURE — 99214 OFFICE O/P EST MOD 30 MIN: CPT

## 2019-03-12 NOTE — PHYSICAL EXAM
[General Appearance - Well Developed] : well developed [Normal Appearance] : normal appearance [Well Groomed] : well groomed [General Appearance - Well Nourished] : well nourished [No Deformities] : no deformities [General Appearance - In No Acute Distress] : no acute distress [Normal Conjunctiva] : the conjunctiva exhibited no abnormalities [Eyelids - No Xanthelasma] : the eyelids demonstrated no xanthelasmas [Normal Oral Mucosa] : normal oral mucosa [No Oral Pallor] : no oral pallor [No Oral Cyanosis] : no oral cyanosis [Normal Jugular Venous A Waves Present] : normal jugular venous A waves present [Normal Jugular Venous V Waves Present] : normal jugular venous V waves present [No Jugular Venous Schwartz A Waves] : no jugular venous schwartz A waves [Respiration, Rhythm And Depth] : normal respiratory rhythm and effort [Exaggerated Use Of Accessory Muscles For Inspiration] : no accessory muscle use [Auscultation Breath Sounds / Voice Sounds] : lungs were clear to auscultation bilaterally [Heart Rate And Rhythm] : heart rate and rhythm were normal [Heart Sounds] : normal S1 and S2 [Murmurs] : no murmurs present [Abdomen Soft] : soft [Abdomen Tenderness] : non-tender [Abdomen Mass (___ Cm)] : no abdominal mass palpated [Abnormal Walk] : normal gait [Gait - Sufficient For Exercise Testing] : the gait was sufficient for exercise testing [Nail Clubbing] : no clubbing of the fingernails [Cyanosis, Localized] : no localized cyanosis [Petechial Hemorrhages (___cm)] : no petechial hemorrhages [Skin Color & Pigmentation] : normal skin color and pigmentation [] : no rash [No Venous Stasis] : no venous stasis [Skin Lesions] : no skin lesions [No Skin Ulcers] : no skin ulcer [No Xanthoma] : no  xanthoma was observed [Oriented To Time, Place, And Person] : oriented to person, place, and time [Affect] : the affect was normal [Mood] : the mood was normal [No Anxiety] : not feeling anxious

## 2019-03-12 NOTE — REASON FOR VISIT
[FreeTextEntry1] : March 12, 2019: The patient comes in for followup of her atypical chest pains, chest tightness, occasional palpitations, and history of frequent pneumonia. She is being followed by pulmonologists as well as an internist and endocrinologist. She denies any shortness of breath at rest. She does complain of nocturnal leg cramps. She is now on a nebulizer Incruse.

## 2019-03-12 NOTE — REVIEW OF SYSTEMS
[see HPI] : see HPI [Palpitations] : palpitations [Negative] : Heme/Lymph [Chest  Pressure] : chest pressure [Shortness Of Breath] : no shortness of breath [Chest Pain] : no chest pain

## 2019-03-12 NOTE — DISCUSSION/SUMMARY
[FreeTextEntry1] : The patient was examined. Her blood pressure was 126/79, and her  pulse was 77. Her lungs were clear to auscultation. Cardiac exam was negative for murmurs rubs or gallops.The remainder of her physical exam was unremarkable. Her EKG showed normal sinus rhythm and was within normal limits. She was advised to stay on  the same medications. She will return in one year, or earlier if needed. She was advised to try tonic water at night to see that helps with her nocturnal leg cramps.

## 2019-04-04 ENCOUNTER — OUTPATIENT (OUTPATIENT)
Dept: OUTPATIENT SERVICES | Facility: HOSPITAL | Age: 69
LOS: 1 days | End: 2019-04-04

## 2019-04-04 VITALS
HEART RATE: 76 BPM | DIASTOLIC BLOOD PRESSURE: 78 MMHG | WEIGHT: 138.01 LBS | RESPIRATION RATE: 18 BRPM | OXYGEN SATURATION: 99 % | SYSTOLIC BLOOD PRESSURE: 118 MMHG | HEIGHT: 63 IN | TEMPERATURE: 98 F

## 2019-04-04 DIAGNOSIS — E03.9 HYPOTHYROIDISM, UNSPECIFIED: ICD-10-CM

## 2019-04-04 DIAGNOSIS — R91.8 OTHER NONSPECIFIC ABNORMAL FINDING OF LUNG FIELD: ICD-10-CM

## 2019-04-04 DIAGNOSIS — Z90.49 ACQUIRED ABSENCE OF OTHER SPECIFIED PARTS OF DIGESTIVE TRACT: Chronic | ICD-10-CM

## 2019-04-04 LAB
ANION GAP SERPL CALC-SCNC: 12 MMO/L — SIGNIFICANT CHANGE UP (ref 7–14)
BUN SERPL-MCNC: 21 MG/DL — SIGNIFICANT CHANGE UP (ref 7–23)
CALCIUM SERPL-MCNC: 10 MG/DL — SIGNIFICANT CHANGE UP (ref 8.4–10.5)
CHLORIDE SERPL-SCNC: 97 MMOL/L — LOW (ref 98–107)
CO2 SERPL-SCNC: 29 MMOL/L — SIGNIFICANT CHANGE UP (ref 22–31)
CREAT SERPL-MCNC: 0.95 MG/DL — SIGNIFICANT CHANGE UP (ref 0.5–1.3)
GLUCOSE SERPL-MCNC: 76 MG/DL — SIGNIFICANT CHANGE UP (ref 70–99)
HCT VFR BLD CALC: 40.2 % — SIGNIFICANT CHANGE UP (ref 34.5–45)
HGB BLD-MCNC: 13 G/DL — SIGNIFICANT CHANGE UP (ref 11.5–15.5)
MCHC RBC-ENTMCNC: 31.1 PG — SIGNIFICANT CHANGE UP (ref 27–34)
MCHC RBC-ENTMCNC: 32.3 % — SIGNIFICANT CHANGE UP (ref 32–36)
MCV RBC AUTO: 96.2 FL — SIGNIFICANT CHANGE UP (ref 80–100)
NRBC # FLD: 0 K/UL — SIGNIFICANT CHANGE UP (ref 0–0)
PLATELET # BLD AUTO: 340 K/UL — SIGNIFICANT CHANGE UP (ref 150–400)
PMV BLD: 10.5 FL — SIGNIFICANT CHANGE UP (ref 7–13)
POTASSIUM SERPL-MCNC: 4.3 MMOL/L — SIGNIFICANT CHANGE UP (ref 3.5–5.3)
POTASSIUM SERPL-SCNC: 4.3 MMOL/L — SIGNIFICANT CHANGE UP (ref 3.5–5.3)
RBC # BLD: 4.18 M/UL — SIGNIFICANT CHANGE UP (ref 3.8–5.2)
RBC # FLD: 13.3 % — SIGNIFICANT CHANGE UP (ref 10.3–14.5)
SODIUM SERPL-SCNC: 138 MMOL/L — SIGNIFICANT CHANGE UP (ref 135–145)
WBC # BLD: 7.43 K/UL — SIGNIFICANT CHANGE UP (ref 3.8–10.5)
WBC # FLD AUTO: 7.43 K/UL — SIGNIFICANT CHANGE UP (ref 3.8–10.5)

## 2019-04-04 RX ORDER — LEVOTHYROXINE SODIUM 125 MCG
1 TABLET ORAL
Qty: 0 | Refills: 0 | COMMUNITY

## 2019-04-04 RX ORDER — ALBUTEROL 90 UG/1
0 AEROSOL, METERED ORAL
Qty: 0 | Refills: 0 | COMMUNITY

## 2019-04-04 RX ORDER — TAMSULOSIN HYDROCHLORIDE 0.4 MG/1
1 CAPSULE ORAL
Qty: 0 | Refills: 0 | COMMUNITY

## 2019-04-04 RX ORDER — AZITHROMYCIN 500 MG/1
1 TABLET, FILM COATED ORAL
Qty: 0 | Refills: 0 | COMMUNITY

## 2019-04-04 RX ORDER — SODIUM CHLORIDE 9 MG/ML
1000 INJECTION, SOLUTION INTRAVENOUS
Qty: 0 | Refills: 0 | Status: DISCONTINUED | OUTPATIENT
Start: 2019-04-16 | End: 2019-05-01

## 2019-04-04 NOTE — H&P PST ADULT - NSICDXFAMILYHX_GEN_ALL_CORE_FT
FAMILY HISTORY:  Mother  Still living? Unknown  Family history of colon cancer in mother, Age at diagnosis: Age Unknown    Sibling  Still living? Unknown  Family history of malignant neoplasm of endometrium, Age at diagnosis: Age Unknown  Family history of prostate cancer, Age at diagnosis: Age Unknown  Family history of thyroid cancer, Age at diagnosis: Age Unknown

## 2019-04-04 NOTE — H&P PST ADULT - NSICDXPASTSURGICALHX_GEN_ALL_CORE_FT
thigh PAST SURGICAL HISTORY:  h/o hysterectomy      H/O:  x2     History of cholecystectomy 7 years ago

## 2019-04-04 NOTE — H&P PST ADULT - RS GEN PE MLT RESP DETAILS PC
clear to auscultation bilaterally/respirations non-labored/breath sounds equal/no rhonchi/no wheezes/no rales

## 2019-04-04 NOTE — H&P PST ADULT - HISTORY OF PRESENT ILLNESS
69 yr old female with medical hx of hypothyroidism and multiple episode of pneumonia last being November 2018 presents for preop evlauation.  Pt is now scheduled for Bronchoscpy Bronoalvelolar Lavage with 69 yr old female with medical hx of hypothyroidism and multiple episode of pneumonia last being November 2018 presents for preop evlauation.  Pt is now scheduled for Bronchoscopy Bronchoalveolar Lavage with Transbronchial Biopsy on 04/16/19.

## 2019-04-04 NOTE — H&P PST ADULT - NSICDXPASTMEDICALHX_GEN_ALL_CORE_FT
PAST MEDICAL HISTORY:  Angiomyolipoma of Kidney    Arrhythmia     Cholelithiasis     Eye disorder clogged eye duct of left eye    h/o Fibroid Uterus     h/o herniated disc Lumbar     h/o Ventricular Tachycardia 2002     Hypothyroidism hashimoto's thyroiditis    Malignant neoplasm of upper-outer quadrant of breast right -> radiation therapy    Mild asthma, unspecified whether complicated, unspecified whether persistent only with cold symptoms    Pneumonia 2012, 2014, 2017 & 2018    Thyroid Nodule PAST MEDICAL HISTORY:  Angiomyolipoma of Kidney    Arrhythmia     Cholelithiasis     Eye disorder clogged eye duct of left eye    h/o Fibroid Uterus     h/o herniated disc Lumbar     h/o Ventricular Tachycardia 2002     Hypothyroidism hashimoto's thyroiditis    Malignant neoplasm of upper-outer quadrant of breast right -> radiation therapy    Mild asthma, unspecified whether complicated, unspecified whether persistent only with cold symptoms    Other nonspecific abnormal finding of lung field     Pneumonia 2012, 2014, 2017 & 2018    Thyroid Nodule

## 2019-04-04 NOTE — H&P PST ADULT - NSICDXPROBLEM_GEN_ALL_CORE_FT
PROBLEM DIAGNOSES  Problem: Other nonspecific abnormal finding of lung field  Assessment and Plan: Scheduled Bronchoscopy Bronchoalveoar Lavage with Transbronchial Biopsy on 04/16/19.  Lab results pending.  Preop instructions provided and questions addressed.    Problem: Hypothyroidism  Assessment and Plan: Pt instructed to take med on the morning of procedure.

## 2019-04-05 ENCOUNTER — APPOINTMENT (OUTPATIENT)
Dept: UROLOGY | Facility: CLINIC | Age: 69
End: 2019-04-05

## 2019-04-05 PROBLEM — J18.9 PNEUMONIA, UNSPECIFIED ORGANISM: Chronic | Status: ACTIVE | Noted: 2017-11-28

## 2019-04-05 PROBLEM — C50.419 MALIGNANT NEOPLASM OF UPPER-OUTER QUADRANT OF UNSPECIFIED FEMALE BREAST: Chronic | Status: ACTIVE | Noted: 2017-11-28

## 2019-04-15 NOTE — ASU PATIENT PROFILE, ADULT - PMH
Angiomyolipoma  of Kidney  Arrhythmia    Cholelithiasis    Eye disorder  clogged eye duct of left eye  h/o Fibroid Uterus    h/o herniated disc Lumbar    h/o Ventricular Tachycardia 2002    Hypothyroidism  hashimoto's thyroiditis  Malignant neoplasm of upper-outer quadrant of breast  right -> radiation therapy  Mild asthma, unspecified whether complicated, unspecified whether persistent  only with cold symptoms  Other nonspecific abnormal finding of lung field    Pneumonia  2012, 2014, 2017 & 2018  Thyroid Nodule

## 2019-04-16 ENCOUNTER — RESULT REVIEW (OUTPATIENT)
Age: 69
End: 2019-04-16

## 2019-04-16 ENCOUNTER — OUTPATIENT (OUTPATIENT)
Dept: OUTPATIENT SERVICES | Facility: HOSPITAL | Age: 69
LOS: 1 days | Discharge: HOME | End: 2019-04-16
Payer: MEDICARE

## 2019-04-16 VITALS
HEART RATE: 73 BPM | DIASTOLIC BLOOD PRESSURE: 86 MMHG | SYSTOLIC BLOOD PRESSURE: 132 MMHG | OXYGEN SATURATION: 100 % | RESPIRATION RATE: 16 BRPM | TEMPERATURE: 97 F

## 2019-04-16 VITALS
RESPIRATION RATE: 16 BRPM | HEIGHT: 63 IN | WEIGHT: 138.01 LBS | TEMPERATURE: 98 F | DIASTOLIC BLOOD PRESSURE: 74 MMHG | SYSTOLIC BLOOD PRESSURE: 133 MMHG | HEART RATE: 76 BPM | OXYGEN SATURATION: 96 %

## 2019-04-16 DIAGNOSIS — R91.8 OTHER NONSPECIFIC ABNORMAL FINDING OF LUNG FIELD: ICD-10-CM

## 2019-04-16 DIAGNOSIS — Z90.49 ACQUIRED ABSENCE OF OTHER SPECIFIED PARTS OF DIGESTIVE TRACT: Chronic | ICD-10-CM

## 2019-04-16 LAB
BODY FLUID TYPE: SIGNIFICANT CHANGE UP
CLARITY SPEC: SIGNIFICANT CHANGE UP
COLOR FLD: COLORLESS — SIGNIFICANT CHANGE UP
EOSINOPHIL # FLD: 2 % — SIGNIFICANT CHANGE UP
GRAM STN SPT: SIGNIFICANT CHANGE UP
LYMPHOCYTES NFR FLD: 5 % — SIGNIFICANT CHANGE UP
MACROPHAGES # FLD: 2 % — SIGNIFICANT CHANGE UP
MESOTHL CELL # FLD: 3 % — SIGNIFICANT CHANGE UP
MONOCYTES # FLD: 6 % — SIGNIFICANT CHANGE UP
NEUTS SEG NFR FLD MANUAL: 13 % — SIGNIFICANT CHANGE UP
OTHER CELLS FLD MANUAL: 69 % — SIGNIFICANT CHANGE UP
SPECIMEN SOURCE: SIGNIFICANT CHANGE UP
TOTAL CELLS COUNTED, BODY FLUID: 100 CELLS — SIGNIFICANT CHANGE UP

## 2019-04-16 PROCEDURE — 88312 SPECIAL STAINS GROUP 1: CPT | Mod: 26,59

## 2019-04-16 PROCEDURE — 71045 X-RAY EXAM CHEST 1 VIEW: CPT | Mod: 26

## 2019-04-16 PROCEDURE — 88112 CYTOPATH CELL ENHANCE TECH: CPT | Mod: 26,59

## 2019-04-16 PROCEDURE — 88305 TISSUE EXAM BY PATHOLOGIST: CPT | Mod: 26

## 2019-04-16 PROCEDURE — 88305 TISSUE EXAM BY PATHOLOGIST: CPT | Mod: 26,59

## 2019-04-16 NOTE — ASU DISCHARGE PLAN (ADULT/PEDIATRIC) - ASU DC SPECIAL INSTRUCTIONSFT
you may have sore throat   you may cough up some blood for the next few days  you may have a low grade fever within the first 24 hours after the procedure

## 2019-04-16 NOTE — ASU DISCHARGE PLAN (ADULT/PEDIATRIC) - CALL YOUR DOCTOR IF YOU HAVE ANY OF THE FOLLOWING:
Swelling that gets worse/Fever greater than (need to indicate Fahrenheit or Celsius)/Nausea and vomiting that does not stop/Unable to urinate/Inability to tolerate liquids or foods

## 2019-04-17 LAB
CULTURE - ACID FAST SMEAR CONCENTRATED: SIGNIFICANT CHANGE UP
SPECIMEN SOURCE: SIGNIFICANT CHANGE UP

## 2019-04-18 LAB
BACTERIA SPT RESP CULT: SIGNIFICANT CHANGE UP
BACTERIA SPT RESP CULT: SIGNIFICANT CHANGE UP
NON-GYNECOLOGICAL CYTOLOGY STUDY: SIGNIFICANT CHANGE UP
SURGICAL PATHOLOGY STUDY: SIGNIFICANT CHANGE UP

## 2019-04-19 LAB
BACTERIA SPT RESP CULT: SIGNIFICANT CHANGE UP
BACTERIA SPT RESP CULT: SIGNIFICANT CHANGE UP

## 2019-04-20 NOTE — H&P PST ADULT - OPHTHALMOLOGIC
Courtney from Lab called with critical result of Ca 6.9 at 0150. Critical lab result read back to Courtney.   Dr. Roe notified of critical lab result at 0158.  Critical lab result read back by Dr. Roe.   negative

## 2019-04-23 LAB
SPECIMEN SOURCE: SIGNIFICANT CHANGE UP

## 2019-05-02 ENCOUNTER — APPOINTMENT (OUTPATIENT)
Dept: GASTROENTEROLOGY | Facility: HOSPITAL | Age: 69
End: 2019-05-02

## 2019-05-02 ENCOUNTER — RESULT REVIEW (OUTPATIENT)
Age: 69
End: 2019-05-02

## 2019-05-02 ENCOUNTER — OUTPATIENT (OUTPATIENT)
Dept: OUTPATIENT SERVICES | Facility: HOSPITAL | Age: 69
LOS: 1 days | Discharge: HOME | End: 2019-05-02
Payer: MEDICARE

## 2019-05-02 DIAGNOSIS — Z90.49 ACQUIRED ABSENCE OF OTHER SPECIFIED PARTS OF DIGESTIVE TRACT: Chronic | ICD-10-CM

## 2019-05-02 DIAGNOSIS — Z12.11 ENCOUNTER FOR SCREENING FOR MALIGNANT NEOPLASM OF COLON: ICD-10-CM

## 2019-05-02 PROCEDURE — 88305 TISSUE EXAM BY PATHOLOGIST: CPT | Mod: 26

## 2019-05-02 PROCEDURE — 45380 COLONOSCOPY AND BIOPSY: CPT

## 2019-05-03 LAB — SURGICAL PATHOLOGY STUDY: SIGNIFICANT CHANGE UP

## 2019-05-16 LAB
FUNGUS SPEC QL CULT: SIGNIFICANT CHANGE UP

## 2019-05-28 LAB
ACID FAST STN SPEC: SIGNIFICANT CHANGE UP

## 2019-08-05 NOTE — H&P PST ADULT - FALLEN IN THE PAST
Cardiac Rehab Note: chart review Pt is a 77 yo s/p PCI/MALI with prior history of stent. Smoking history assessed. Patient is a former smoker. Smoking Cessation Program link has not been added to the AVS. EF 55%  on 8/5/19 per cath. CAD education folder, with heart heathy diet, warning signs, heart facts, catheterization brochure, and out patient cardiac rehab program provided to [facility] on 8/5/19. Educated using teach back method. Reviewed CAD diagnosis definition and purpose of intervention. Discussed risk factors for CAD to include the following: family history, elevated BMI, hyperlipidemia, hypertension, diabetes, and stress. Discussed Heart Healthy/Low Sodium (less than 2000 mg) diet. Pt voiced concerns regarding diet, he is the primary cook at home secondary to wife's health status. He admits to eating out frequently. Reviewed the importance of medication compliance, follow up appointments with cardiologist, signs and symptoms of angina, and what to report to physician after discharge. Emphasized the value of cardiac rehab. Discussed Cardiac Rehab Program format, benefits, and encouraged enrollment to assist with risk modification and management. Initial Cardiac Rehab Program intake appointment scheduled for 9/12/19 @2:30 pm and appointment information is on the AVS. Patient provided orientation packet, instructed to complete packet a couple days prior to appointment, wear gym appropriate clothing and shoes, and bring current list of medications. Patient is to call if unable to keep appointment, need to reschedule or additional questions. [facility] verbalized understanding with questions answered.   Edelmira Burns RN 
  

no

## 2019-10-17 PROBLEM — R91.8 OTHER NONSPECIFIC ABNORMAL FINDING OF LUNG FIELD: Chronic | Status: ACTIVE | Noted: 2019-04-04

## 2019-10-30 ENCOUNTER — APPOINTMENT (OUTPATIENT)
Dept: ULTRASOUND IMAGING | Facility: IMAGING CENTER | Age: 69
End: 2019-10-30

## 2019-10-30 ENCOUNTER — APPOINTMENT (OUTPATIENT)
Dept: MAMMOGRAPHY | Facility: IMAGING CENTER | Age: 69
End: 2019-10-30
Payer: MEDICARE

## 2019-10-30 ENCOUNTER — OUTPATIENT (OUTPATIENT)
Dept: OUTPATIENT SERVICES | Facility: HOSPITAL | Age: 69
LOS: 1 days | End: 2019-10-30
Payer: MEDICARE

## 2019-10-30 DIAGNOSIS — Z90.49 ACQUIRED ABSENCE OF OTHER SPECIFIED PARTS OF DIGESTIVE TRACT: Chronic | ICD-10-CM

## 2019-10-30 DIAGNOSIS — R92.8 OTHER ABNORMAL AND INCONCLUSIVE FINDINGS ON DIAGNOSTIC IMAGING OF BREAST: ICD-10-CM

## 2019-10-30 PROCEDURE — 76641 ULTRASOUND BREAST COMPLETE: CPT | Mod: 26,50

## 2019-10-30 PROCEDURE — 77066 DX MAMMO INCL CAD BI: CPT

## 2019-10-30 PROCEDURE — G0279: CPT | Mod: 26

## 2019-10-30 PROCEDURE — G0279: CPT

## 2019-10-30 PROCEDURE — 76641 ULTRASOUND BREAST COMPLETE: CPT

## 2019-10-30 PROCEDURE — 77066 DX MAMMO INCL CAD BI: CPT | Mod: 26

## 2019-12-02 ENCOUNTER — APPOINTMENT (OUTPATIENT)
Dept: OBGYN | Facility: CLINIC | Age: 69
End: 2019-12-02
Payer: MEDICARE

## 2019-12-02 VITALS
SYSTOLIC BLOOD PRESSURE: 127 MMHG | BODY MASS INDEX: 22.82 KG/M2 | HEART RATE: 88 BPM | WEIGHT: 142 LBS | HEIGHT: 66 IN | DIASTOLIC BLOOD PRESSURE: 78 MMHG

## 2019-12-02 DIAGNOSIS — N95.2 POSTMENOPAUSAL ATROPHIC VAGINITIS: ICD-10-CM

## 2019-12-02 PROCEDURE — G0101: CPT

## 2019-12-02 NOTE — PHYSICAL EXAM
[Awake] : awake [Alert] : alert [Acute Distress] : no acute distress [Mass] : no breast mass [Soft] : soft [Axillary LAD] : no axillary lymphadenopathy [Nipple Discharge] : no nipple discharge [Tender] : non tender [Oriented x3] : oriented to person, place, and time [Normal] : vagina [No Bleeding] : there was no active vaginal bleeding [Absent] : absent [Adnexa Absent] : absent bilaterally

## 2020-01-27 ENCOUNTER — NON-APPOINTMENT (OUTPATIENT)
Age: 70
End: 2020-01-27

## 2020-01-27 ENCOUNTER — APPOINTMENT (OUTPATIENT)
Dept: CARDIOLOGY | Facility: CLINIC | Age: 70
End: 2020-01-27
Payer: MEDICARE

## 2020-01-27 VITALS
BODY MASS INDEX: 233.55 KG/M2 | SYSTOLIC BLOOD PRESSURE: 135 MMHG | HEART RATE: 86 BPM | WEIGHT: 293 LBS | DIASTOLIC BLOOD PRESSURE: 78 MMHG | OXYGEN SATURATION: 98 %

## 2020-01-27 DIAGNOSIS — Z15.09 GENETIC SUSCEPTIBILITY TO OTHER MALIGNANT NEOPLASM: ICD-10-CM

## 2020-01-27 DIAGNOSIS — Z87.42 PERSONAL HISTORY OF OTHER DISEASES OF THE FEMALE GENITAL TRACT: ICD-10-CM

## 2020-01-27 DIAGNOSIS — Z87.39 PERSONAL HISTORY OF OTHER DISEASES OF THE MUSCULOSKELETAL SYSTEM AND CONNECTIVE TISSUE: ICD-10-CM

## 2020-01-27 DIAGNOSIS — M15.9 POLYOSTEOARTHRITIS, UNSPECIFIED: ICD-10-CM

## 2020-01-27 DIAGNOSIS — K80.50 CALCULUS OF BILE DUCT W/OUT CHOLANGITIS OR CHOLECYSTITIS W/OUT OBSTRUCTION: ICD-10-CM

## 2020-01-27 DIAGNOSIS — Z86.018 PERSONAL HISTORY OF OTHER BENIGN NEOPLASM: ICD-10-CM

## 2020-01-27 DIAGNOSIS — Z86.79 PERSONAL HISTORY OF OTHER DISEASES OF THE CIRCULATORY SYSTEM: ICD-10-CM

## 2020-01-27 DIAGNOSIS — R92.1 MAMMOGRAPHIC CALCIFICATION FOUND ON DIAGNOSTIC IMAGING OF BREAST: ICD-10-CM

## 2020-01-27 DIAGNOSIS — R92.8 OTHER ABNORMAL AND INCONCLUSIVE FINDINGS ON DIAGNOSTIC IMAGING OF BREAST: ICD-10-CM

## 2020-01-27 DIAGNOSIS — K30 FUNCTIONAL DYSPEPSIA: ICD-10-CM

## 2020-01-27 PROCEDURE — 99214 OFFICE O/P EST MOD 30 MIN: CPT

## 2020-01-27 PROCEDURE — 93000 ELECTROCARDIOGRAM COMPLETE: CPT

## 2020-01-27 RX ORDER — UMECLIDINIUM 62.5 UG/1
62.5 AEROSOL, POWDER ORAL DAILY
Qty: 3 | Refills: 0 | Status: DISCONTINUED | COMMUNITY
Start: 2019-03-12 | End: 2020-01-27

## 2020-01-27 NOTE — REVIEW OF SYSTEMS
[see HPI] : see HPI [Chest  Pressure] : chest pressure [Negative] : Heme/Lymph [Shortness Of Breath] : no shortness of breath [Chest Pain] : no chest pain [Palpitations] : no palpitations

## 2020-01-27 NOTE — REASON FOR VISIT
[FreeTextEntry1] : March 12, 2019: The patient comes in for followup of her atypical chest pains, chest tightness, occasional palpitations, and history of frequent pneumonia. She is being followed by pulmonologists as well as an internist and endocrinologist. She denies any shortness of breath at rest. She does complain of nocturnal leg cramps. She is now on a nebulizer Incruse.\par January 27, 2020: The patient reports that she was sick for a month and a half starting around [facility].  She was treated for pneumonia with multiple different medicines including inhalers.  She is now left with a pleuritic chest discomfort.  She says it only hurts when she takes a deep breath.  She is on currently multiple inhalers.  She is on Yupelri, Brovana, and sodium chloride by nebulizer.  Some of the other medicines that she was treated with when they thought she had pneumonia included generic Flonase nasal spray, azelastine nasal spray, albuterol inhaler, doxycycline, and prednisone.

## 2020-01-27 NOTE — DISCUSSION/SUMMARY
[FreeTextEntry1] : The patient was examined. Her blood pressure was 135/78, and her  pulse was 86. Her lungs were clear to auscultation. Cardiac exam was negative for murmurs rubs or gallops.The remainder of her physical exam was unremarkable. Her EKG showed normal sinus rhythm and minor, nonspecific, ST and T wave changes.  Because of the atypical chest discomfort we will send the patient for treadmill stress test.  We will also send her for an echocardiogram for LV size and function and to assess her mitral regurgitation.  She will endeavor to get me blood test results from both her endocrinologist and her pulmonologist.. She was advised to stay on  the same medications. She will return in one year, or earlier if needed.

## 2020-03-04 ENCOUNTER — APPOINTMENT (OUTPATIENT)
Dept: CARDIOLOGY | Facility: CLINIC | Age: 70
End: 2020-03-04
Payer: MEDICARE

## 2020-03-04 PROCEDURE — 93306 TTE W/DOPPLER COMPLETE: CPT

## 2020-04-09 PROBLEM — Z15.09 GENETIC PREDISPOSITION TO CANCER: Status: RESOLVED | Noted: 2018-11-26 | Resolved: 2020-04-09

## 2020-05-08 ENCOUNTER — NON-APPOINTMENT (OUTPATIENT)
Age: 70
End: 2020-05-08

## 2020-05-08 DIAGNOSIS — Z20.828 CONTACT WITH AND (SUSPECTED) EXPOSURE TO OTHER VIRAL COMMUNICABLE DISEASES: ICD-10-CM

## 2020-05-18 ENCOUNTER — TRANSCRIPTION ENCOUNTER (OUTPATIENT)
Age: 70
End: 2020-05-18

## 2020-07-01 ENCOUNTER — APPOINTMENT (OUTPATIENT)
Dept: CARDIOLOGY | Facility: CLINIC | Age: 70
End: 2020-07-01
Payer: MEDICARE

## 2020-07-01 PROCEDURE — 93015 CV STRESS TEST SUPVJ I&R: CPT

## 2020-07-31 ENCOUNTER — LABORATORY RESULT (OUTPATIENT)
Age: 70
End: 2020-07-31

## 2020-08-05 ENCOUNTER — APPOINTMENT (OUTPATIENT)
Dept: PULMONOLOGY | Facility: CLINIC | Age: 70
End: 2020-08-05
Payer: MEDICARE

## 2020-08-05 PROCEDURE — 99205 OFFICE O/P NEW HI 60 MIN: CPT

## 2020-08-05 NOTE — HISTORY OF PRESENT ILLNESS
[TextBox_4] : 70-year-old female initial visit now done as a telemedicine visit\par Complicated pulmonary history to be reviewed\par There is a diagnosis of asthma and she reports bronchiectasis with multiple histories of pneumonia at least 4-5 reported last very ill November December January with an additional illness in March\par She reports abnormal chest CTs and chest x-rays\par Last chest CT she reports was approximately 2018 where she was told of having scarlike tissue\par With these episodes she describes cough chest tightness wheezing sputum production\par Medications through the winter included Y UL P DARION in the nebulizer, Brovana right axilla sodium chloride nebulizer nasal Astelin nasal Flonase albuterol HFA course of prednisone and antibiotics including doxycycline x2 courses and Zithromax.\par \par She states 1 to 1-1/2 weeks ago she developed some trouble breathing self started treatment with albuterol\par She did not receive any antibiotics or steroids with this symptomatology most recently noted\par She does not report at present any sputum production for the past several days and it is clear without hemoptysis purulence or foul smelling\par She also states that she was sick in March there was some question whether she could have COVID-19 but a COVID  19 PCR and antibody testing subsequently was negative\par Other significant history includes a bronchoscopy dating back to April 2019 which was negative including AFB fungus mold and  negative TBLBx.\par Last chest x-ray available for review dates back to April 16, 2019 which was status post a transbronchial biopsy.\par Thickening of the minor fissure which was noted and reported chronic\par Reticulonodular opacities right midlung\par Ill-defined focal opacity periphery left upper to mid lung with some central extension reported to the left hilum with out pneumothorax pleural effusion or significant volume loss.\par \par Additional history\par Right breast DCIS treated radiation therapy 2017\par Childhood polio\par Mild mitral regurgitation\par Hypothyroidism\par Nocturnal myoclonus\par \par Vaccination profile flu vaccinations up-to-date\par 2018 2019 up-to-date with Prevnar and PCV 23\par \par  [YearQuit] : 1981 [TextBox_11] : 1

## 2020-08-05 NOTE — DISCUSSION/SUMMARY
[FreeTextEntry1] : Asthma\par Abnormal CXR CT CHEST\par R/O Bronchiectasis\par Hx as  noted\par hypothyroid\par Childhood polio\par Mild MR\par \par Recommendations\par PFT\par 6 min walk\par NIOX\par MIP MEP\par will need to RS\par CXR office and then make decision re CT CHEST\par TX\par restart Wixela 250-50 mcg 1 puff BID and Rinse\par  prn Albuterol\par  NACL in NEB only BID\par no indication Antibx\par Issue immuno serology IGA and IgG  with subtypes A1AT titer HP panel and possible autoimmune serology\par To consider VEST therapy if Bronchiectasis confirmed

## 2020-08-05 NOTE — REVIEW OF SYSTEMS
[Fatigue] : no fatigue [Fever] : no fever [Hemoptysis] : no hemoptysis [Poor Appetite] : no poor appetite [Chills] : no chills [Recent Wt Gain (___ Lbs)] : ~T no recent weight gain [Pleuritic Pain] : no pleuritic pain [A.M. Dry Mouth] : no a.m. dry mouth [Dyspnea] : no dyspnea [Focal Weakness] : no focal weakness [Headache] : no headache [SOB on Exertion] : no sob on exertion [Numbness] : no numbness [Dizziness] : no dizziness [Seizures] : no seizures [Confusion] : no confusion [Memory Loss] : no memory loss [Paralysis] : no paralysis [Diabetes] : no diabetes [TextBox_83] : History renal angiomyolipoma [TextBox_44] : MR [TextBox_122] : nocturnal myoclonus, reports polio as a child without residual  [TextBox_144] : Thyroid nodule, Hypothyroidism

## 2020-08-08 LAB — SARS-COV-2 N GENE NPH QL NAA+PROBE: NOT DETECTED

## 2020-08-12 ENCOUNTER — APPOINTMENT (OUTPATIENT)
Dept: PULMONOLOGY | Facility: CLINIC | Age: 70
End: 2020-08-12
Payer: MEDICARE

## 2020-08-12 VITALS
HEIGHT: 66 IN | WEIGHT: 140 LBS | DIASTOLIC BLOOD PRESSURE: 74 MMHG | RESPIRATION RATE: 16 BRPM | SYSTOLIC BLOOD PRESSURE: 120 MMHG | HEART RATE: 68 BPM | OXYGEN SATURATION: 93 % | TEMPERATURE: 209.12 F | BODY MASS INDEX: 22.5 KG/M2

## 2020-08-12 LAB — POCT - HEMOGLOBIN (HGB), QUANTITATIVE, TRANSCUTANEOUS: 13.7

## 2020-08-12 PROCEDURE — 94060 EVALUATION OF WHEEZING: CPT

## 2020-08-12 PROCEDURE — ZZZZZ: CPT

## 2020-08-12 PROCEDURE — 99214 OFFICE O/P EST MOD 30 MIN: CPT | Mod: 25

## 2020-08-12 PROCEDURE — 88738 HGB QUANT TRANSCUTANEOUS: CPT

## 2020-08-12 PROCEDURE — 94618 PULMONARY STRESS TESTING: CPT

## 2020-08-12 PROCEDURE — 94729 DIFFUSING CAPACITY: CPT

## 2020-08-12 PROCEDURE — 94727 GAS DIL/WSHOT DETER LNG VOL: CPT

## 2020-08-12 PROCEDURE — 95012 NITRIC OXIDE EXP GAS DETER: CPT

## 2020-08-12 PROCEDURE — 71046 X-RAY EXAM CHEST 2 VIEWS: CPT

## 2020-08-12 NOTE — REVIEW OF SYSTEMS
[Cough] : cough [Postnasal Drip] : postnasal drip [Frequent URIs] : frequent URIs [Chest Tightness] : chest tightness [Sputum] : sputum [Wheezing] : wheezing [Nasal Discharge] : nasal discharge [Negative] : Hematologic [Fever] : no fever [Fatigue] : no fatigue [Recent Wt Gain (___ Lbs)] : ~T no recent weight gain [Chills] : no chills [Poor Appetite] : no poor appetite [Hemoptysis] : no hemoptysis [Dyspnea] : no dyspnea [Pleuritic Pain] : no pleuritic pain [A.M. Dry Mouth] : no a.m. dry mouth [SOB on Exertion] : no sob on exertion [Headache] : no headache [Focal Weakness] : no focal weakness [Seizures] : no seizures [Dizziness] : no dizziness [Numbness] : no numbness [Memory Loss] : no memory loss [Confusion] : no confusion [Paralysis] : no paralysis [Diabetes] : no diabetes [TextBox_44] : MR [TextBox_83] : History renal angiomyolipoma [TextBox_122] : nocturnal myoclonus, reports polio as a child without residual  [TextBox_144] : Thyroid nodule, Hypothyroidism

## 2020-08-12 NOTE — REASON FOR VISIT
[Follow-Up] : a follow-up visit [Abnormal CXR/ Chest CT] : an abnormal CXR/ chest CT [Bronchiectasis] : bronchiectasis [TextBox_44] : Chest congestion

## 2020-08-12 NOTE — PHYSICAL EXAM
[Normal Oropharynx] : normal oropharynx [No Acute Distress] : no acute distress [Normal Appearance] : normal appearance [No Neck Mass] : no neck mass [Normal Rate/Rhythm] : normal rate/rhythm [No Murmurs] : no murmurs [Normal S1, S2] : normal s1, s2 [No Resp Distress] : no resp distress [No Abnormalities] : no abnormalities [Clear to Auscultation Bilaterally] : clear to auscultation bilaterally [Benign] : benign [No Clubbing] : no clubbing [Normal Gait] : normal gait [No Cyanosis] : no cyanosis [No Edema] : no edema [FROM] : FROM [Normal Color/ Pigmentation] : normal color/ pigmentation [No Focal Deficits] : no focal deficits [Oriented x3] : oriented x3 [Normal Affect] : normal affect

## 2020-08-12 NOTE — PROCEDURE
[FreeTextEntry1] : PFT with body box August 12, 2020\par Normal flow rates\par Mild obstructive pattern with an FEV1 FVC 74\par No bronchodilator response at FEV1\par 20% response at small airways\par Normal lung volumes are\par Noted increased % predicted.\par Specific inductance and resistance normal\par Diffusion normal 95% predicted.\par Hemoglobin 13.79\par \par Pulmonary 6-minute walk step stress test August 12, 2020\par Total steps 1/10/2017\par Baseline room air O2 saturation 96%\par Desaturation at minute 689% with immediate recovery to 96%\par Impression minimal O2 desaturation\par No indication for portable oxygen therapy\par \par Chest x-ray PA lateral August 12, 2020\par Normal cardiac size\par Scarring left lower lung zone cannot exclude component of bronchiectasis\par Right lung is otherwise clear\par No evidence for jodi adenopathy\par No dominant pulmonary nodules or pneumothorax\par Impression bronchiectatic change left lower lung zone\par \par NIOX 14 ppb\par \par \par Data review\par Alpha-1 antitrypsin titer negative\par CF positive gene mutation 2789+ 5G.  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

## 2020-08-12 NOTE — DISCUSSION/SUMMARY
[FreeTextEntry1] : Asthma\par Abnormal CXR CT CHEST\par Bronchiectasis\par Hx as  noted\par hypothyroid\par Childhood polio\par Mild MR\par 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\par \par Recommendations\par PFT 3 mos\par 6 min walk- f/u 3 months\par NIOX\par TX\par restart Wixela 250-50 mcg 1 puff BID and Rinse\par  prn Albuterol\par  NACL in NEB only BID\par no indication Antibx\par Issue immuno serology IGA and IgG  with subtypes A1AT titer HP panel and possible autoimmune serology- reviewed\par To consider VEST therapy if Bronchiectasis confirmed

## 2020-08-12 NOTE — HISTORY OF PRESENT ILLNESS
[Former] : former [< 30 pack-years] : < 30 pack-years [TextBox_4] : 70-year-old female initial visit now done as a telemedicine visit\par Complicated pulmonary history to be reviewed\par There is a diagnosis of asthma and she reports bronchiectasis with multiple histories of pneumonia at least 4-5 reported last very ill November December January with an additional illness in March\par She reports abnormal chest CTs and chest x-rays\par Last chest CT she reports was approximately 2018 where she was told of having scarlike tissue\par With these episodes she describes cough chest tightness wheezing sputum production\par Medications through the winter included Y UL P DARION in the nebulizer, Brovana right axilla sodium chloride nebulizer nasal Astelin nasal Flonase albuterol HFA course of prednisone and antibiotics including doxycycline x2 courses and Zithromax.\par \par She states 1 to 1-1/2 weeks ago she developed some trouble breathing self started treatment with albuterol\par She did not receive any antibiotics or steroids with this symptomatology most recently noted\par She does not report at present any sputum production for the past several days and it is clear without hemoptysis purulence or foul smelling\par She also states that she was sick in March there was some question whether she could have COVID-19 but a COVID  19 PCR and antibody testing subsequently was negative\par Other significant history includes a bronchoscopy dating back to April 2019 which was negative including AFB fungus mold and  negative TBLBx.\par Last chest x-ray available for review dates back to April 16, 2019 which was status post a transbronchial biopsy.\par Thickening of the minor fissure which was noted and reported chronic\par Reticulonodular opacities right midlung\par Ill-defined focal opacity periphery left upper to mid lung with some central extension reported to the left hilum with out pneumothorax pleural effusion or significant volume loss.\par \par Additional history\par Right breast DCIS treated radiation therapy 2017\par Childhood polio\par Mild mitral regurgitation\par Hypothyroidism\par Nocturnal myoclonus\par \par Vaccination profile flu vaccinations up-to-date\par 2018 2019 up-to-date with Prevnar and PCV 23\par \par  [TextBox_11] : 1 [YearQuit] : 1981

## 2020-09-09 ENCOUNTER — APPOINTMENT (OUTPATIENT)
Dept: PULMONOLOGY | Facility: CLINIC | Age: 70
End: 2020-09-09
Payer: MEDICARE

## 2020-09-09 VITALS
SYSTOLIC BLOOD PRESSURE: 136 MMHG | OXYGEN SATURATION: 98 % | DIASTOLIC BLOOD PRESSURE: 81 MMHG | TEMPERATURE: 207.5 F | HEART RATE: 64 BPM

## 2020-09-09 PROCEDURE — 99214 OFFICE O/P EST MOD 30 MIN: CPT | Mod: 25

## 2020-09-09 PROCEDURE — G0008: CPT

## 2020-09-09 PROCEDURE — 36415 COLL VENOUS BLD VENIPUNCTURE: CPT

## 2020-09-09 PROCEDURE — 90662 IIV NO PRSV INCREASED AG IM: CPT

## 2020-09-09 NOTE — PHYSICAL EXAM
[Normal Oropharynx] : normal oropharynx [No Acute Distress] : no acute distress [No Neck Mass] : no neck mass [Normal Appearance] : normal appearance [Normal S1, S2] : normal s1, s2 [Normal Rate/Rhythm] : normal rate/rhythm [No Murmurs] : no murmurs [No Resp Distress] : no resp distress [Clear to Auscultation Bilaterally] : clear to auscultation bilaterally [Benign] : benign [No Abnormalities] : no abnormalities [No Clubbing] : no clubbing [Normal Gait] : normal gait [No Edema] : no edema [No Cyanosis] : no cyanosis [FROM] : FROM [Normal Color/ Pigmentation] : normal color/ pigmentation [No Focal Deficits] : no focal deficits [Oriented x3] : oriented x3 [Normal Affect] : normal affect

## 2020-09-10 LAB — IGA SER QL IEP: 70 MG/DL

## 2020-09-10 NOTE — REVIEW OF SYSTEMS
[Cough] : cough [Postnasal Drip] : postnasal drip [Chest Tightness] : chest tightness [Sputum] : sputum [Frequent URIs] : frequent URIs [Wheezing] : wheezing [Nasal Discharge] : nasal discharge [Negative] : Psychiatric [Fever] : no fever [Recent Wt Gain (___ Lbs)] : ~T no recent weight gain [Fatigue] : no fatigue [Chills] : no chills [Poor Appetite] : no poor appetite [Hemoptysis] : no hemoptysis [Dyspnea] : no dyspnea [A.M. Dry Mouth] : no a.m. dry mouth [Pleuritic Pain] : no pleuritic pain [SOB on Exertion] : no sob on exertion [Headache] : no headache [Focal Weakness] : no focal weakness [Seizures] : no seizures [Dizziness] : no dizziness [Numbness] : no numbness [Memory Loss] : no memory loss [Diabetes] : no diabetes [Confusion] : no confusion [Paralysis] : no paralysis [TextBox_122] : nocturnal myoclonus, reports polio as a child without residual  [TextBox_83] : History renal angiomyolipoma [TextBox_44] : MR [TextBox_144] : Thyroid nodule, Hypothyroidism

## 2020-09-10 NOTE — DISCUSSION/SUMMARY
[FreeTextEntry1] : Asthma\par Abnormal CXR CT CHEST\par Bronchiectasis\par IgA deficiency\par Hx as  noted\par hypothyroid\par Childhood polio\par Mild MR\par 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\par noted just below normal IGA\par \par Recommendations\par PFT 3 mos\par 6 min walk- f/u 3 months\par NIOX\par TX\par restart Wixela 250-50 mcg 1 puff BID and Rinse\par  prn Albuterol\par  NACL in NEB only BID\par no indication Antibx\par Issue immuno serology IGA and IgG  with subtypes A1AT titer HP panel and possible autoimmune serology- reviewed\par To consider VEST therapy if Bronchiectasis confirmed\par check IgA  iIgG and ab titers\par  issue r/o MAEGAN

## 2020-09-10 NOTE — HISTORY OF PRESENT ILLNESS
[Former] : former [< 30 pack-years] : < 30 pack-years [Daytime Somnolence] : daytime somnolence [Snoring] : snoring [TextBox_4] : 70-year-old female initial visit now done as a telemedicine visit\par Complicated pulmonary history to be reviewed\par There is a diagnosis of asthma and she reports bronchiectasis with multiple histories of pneumonia at least 4-5 reported last very ill November December January with an additional illness in March\par She reports abnormal chest CTs and chest x-rays\par Last chest CT she reports was approximately 2018 where she was told of having scarlike tissue\par With these episodes she describes cough chest tightness wheezing sputum production\par Medications through the winter included Y UL P DARION in the nebulizer, Brovana right axilla sodium chloride nebulizer nasal Astelin nasal Flonase albuterol HFA course of prednisone and antibiotics including doxycycline x2 courses and Zithromax.\par \par She states 1 to 1-1/2 weeks ago she developed some trouble breathing self started treatment with albuterol\par She did not receive any antibiotics or steroids with this symptomatology most recently noted\par She does not report at present any sputum production for the past several days and it is clear without hemoptysis purulence or foul smelling\par She also states that she was sick in March there was some question whether she could have COVID-19 but a COVID  19 PCR and antibody testing subsequently was negative\par Other significant history includes a bronchoscopy dating back to April 2019 which was negative including AFB fungus mold and  negative TBLBx.\par Last chest x-ray available for review dates back to April 16, 2019 which was status post a transbronchial biopsy.\par Thickening of the minor fissure which was noted and reported chronic\par Reticulonodular opacities right midlung\par Ill-defined focal opacity periphery left upper to mid lung with some central extension reported to the left hilum with out pneumothorax pleural effusion or significant volume loss.\par \par Additional history\par Right breast DCIS treated radiation therapy 2017\par Childhood polio\par Mild mitral regurgitation\par Hypothyroidism\par Nocturnal myoclonus\par \par Vaccination profile flu vaccinations up-to-date\par 2018 2019 up-to-date with Prevnar and PCV 23\par \par  [TextBox_11] : 1 [YearQuit] : 1981

## 2020-09-10 NOTE — PROCEDURE
[FreeTextEntry1] : PFT with body box August 12, 2020\par Normal flow rates\par Mild obstructive pattern with an FEV1 FVC 74\par No bronchodilator response at FEV1\par 20% response at small airways\par Normal lung volumes are\par Noted increased % predicted.\par Specific inductance and resistance normal\par Diffusion normal 95% predicted.\par Hemoglobin 13.79\par \par Pulmonary 6-minute walk step stress test August 12, 2020\par Total steps 1/10/2017\par Baseline room air O2 saturation 96%\par Desaturation at minute 689% with immediate recovery to 96%\par Impression minimal O2 desaturation\par No indication for portable oxygen therapy\par \par Chest x-ray PA lateral August 12, 2020\par Normal cardiac size\par Scarring left lower lung zone cannot exclude component of bronchiectasis\par Right lung is otherwise clear\par No evidence for jodi adenopathy\par No dominant pulmonary nodules or pneumothorax\par Impression bronchiectatic change left lower lung zone\par \par NIOX 14 ppb\par \par Blood Draw\par Persistent mild reduction IgA normal range 84.99 patient is 72\par Data review\par Alpha-1 antitrypsin titer negative\par CF positive gene mutation 2789+ 5G.  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

## 2020-09-11 LAB
IGG SUBSET TOTAL IGG: 1551 MG/DL
IGG1 SER-MCNC: 1291 MG/DL
IGG2 SER-MCNC: 38 MG/DL
IGG3 SER-MCNC: 9 MG/DL
IGG4 SER-MCNC: 9 MG/DL

## 2020-09-12 LAB — C TETANI IGG SER-ACNC: 0.83 IU/ML

## 2020-09-21 LAB
DEPRECATED S PNEUM 1 IGG SER-MCNC: 3.6 MCG/ML
DEPRECATED S PNEUM12 AB SER-ACNC: <0.4 MCG/ML
DEPRECATED S PNEUM14 AB SER-ACNC: 0.7 MCG/ML
DEPRECATED S PNEUM17 IGG SER IA-MCNC: 0.7 MCG/ML
DEPRECATED S PNEUM18 IGG SER IA-MCNC: <0.4 MCG/ML
DEPRECATED S PNEUM19 IGG SER-MCNC: 4.3 MCG/ML
DEPRECATED S PNEUM19 IGG SER-MCNC: 8.1 MCG/ML
DEPRECATED S PNEUM2 IGG SER-MCNC: 1 MCG/ML
DEPRECATED S PNEUM20 IGG SER-MCNC: <0.4 MCG/ML
DEPRECATED S PNEUM22 IGG SER-MCNC: 8.3 MCG/ML
DEPRECATED S PNEUM23 AB SER-ACNC: 11.9 MCG/ML
DEPRECATED S PNEUM3 AB SER-ACNC: <0.4 MCG/ML
DEPRECATED S PNEUM34 IGG SER-MCNC: 0.6 MCG/ML
DEPRECATED S PNEUM4 AB SER-ACNC: <0.4 MCG/ML
DEPRECATED S PNEUM5 IGG SER-MCNC: 3.9 MCG/ML
DEPRECATED S PNEUM6 IGG SER-MCNC: 1.2 MCG/ML
DEPRECATED S PNEUM7 IGG SER-ACNC: 2 MCG/ML
DEPRECATED S PNEUM8 AB SER-ACNC: 1.6 MCG/ML
DEPRECATED S PNEUM9 AB SER-ACNC: NORMAL
DEPRECATED S PNEUM9 IGG SER-MCNC: 1 MCG/ML
STREPTOCOCCUS PNEUMONIAE SEROTYPE 11A: 0.7 MCG/ML
STREPTOCOCCUS PNEUMONIAE SEROTYPE 15B: 1.8 MCG/ML
STREPTOCOCCUS PNEUMONIAE SEROTYPE 33F: 0.8 MCG/ML

## 2020-10-14 ENCOUNTER — APPOINTMENT (OUTPATIENT)
Dept: PULMONOLOGY | Facility: CLINIC | Age: 70
End: 2020-10-14
Payer: MEDICARE

## 2020-10-14 VITALS
HEIGHT: 66 IN | BODY MASS INDEX: 21.38 KG/M2 | WEIGHT: 133 LBS | HEART RATE: 74 BPM | TEMPERATURE: 208.4 F | SYSTOLIC BLOOD PRESSURE: 134 MMHG | DIASTOLIC BLOOD PRESSURE: 84 MMHG | OXYGEN SATURATION: 94 %

## 2020-10-14 VITALS — DIASTOLIC BLOOD PRESSURE: 80 MMHG | SYSTOLIC BLOOD PRESSURE: 120 MMHG

## 2020-10-14 PROCEDURE — 99214 OFFICE O/P EST MOD 30 MIN: CPT

## 2020-10-14 NOTE — PHYSICAL EXAM
[No Acute Distress] : no acute distress [Normal Oropharynx] : normal oropharynx [Normal Appearance] : normal appearance [No Neck Mass] : no neck mass [Normal S1, S2] : normal s1, s2 [Normal Rate/Rhythm] : normal rate/rhythm [Clear to Auscultation Bilaterally] : clear to auscultation bilaterally [No Resp Distress] : no resp distress [No Murmurs] : no murmurs [Benign] : benign [No Abnormalities] : no abnormalities [Normal Gait] : normal gait [No Clubbing] : no clubbing [No Cyanosis] : no cyanosis [FROM] : FROM [No Edema] : no edema [Normal Color/ Pigmentation] : normal color/ pigmentation [No Focal Deficits] : no focal deficits [Oriented x3] : oriented x3 [Normal Affect] : normal affect

## 2020-10-14 NOTE — DISCUSSION/SUMMARY
[FreeTextEntry1] : Asthma\par Abnormal CXR CT CHEST\par Bronchiectasis\par IgA deficiency\par Hx as  noted\par hypothyroid\par Childhood polio\par Mild MR\par 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\par noted just below normal IGA\par \par Recommendations\par PFT 3 mos\par 6 min walk- f/u 3 months\par NIOX\par TX\par restart Wixela 250-50 mcg 1 puff BID and Rinse- onlu using QD sec leg  cramps plus voice issue\par CHANGE LAMA Sample Spiriva 2.5 mcg 2 puffs QD\par  prn Albuterol\par  NACL in NEB only BID\par no indication Antibx\par Issue immuno serology IGA and IgG  with subtypes A1AT titer HP panel and possible autoimmune serology- reviewed\par To consider VEST therapy if Bronchiectasis confirmed\par \par  issue r/o MAEGAN on hold

## 2020-10-14 NOTE — REVIEW OF SYSTEMS
[Postnasal Drip] : postnasal drip [Cough] : cough [Chest Tightness] : chest tightness [Frequent URIs] : frequent URIs [Sputum] : sputum [Wheezing] : wheezing [Nasal Discharge] : nasal discharge [Negative] : Psychiatric [Fever] : no fever [Fatigue] : no fatigue [Recent Wt Gain (___ Lbs)] : ~T no recent weight gain [Chills] : no chills [Dyspnea] : no dyspnea [Hemoptysis] : no hemoptysis [Pleuritic Pain] : no pleuritic pain [Poor Appetite] : no poor appetite [A.M. Dry Mouth] : no a.m. dry mouth [SOB on Exertion] : no sob on exertion [Focal Weakness] : no focal weakness [Headache] : no headache [Seizures] : no seizures [Dizziness] : no dizziness [Memory Loss] : no memory loss [Numbness] : no numbness [Paralysis] : no paralysis [Confusion] : no confusion [Diabetes] : no diabetes [TextBox_44] : MR [TextBox_83] : History renal angiomyolipoma [TextBox_122] : nocturnal myoclonus, reports polio as a child without residual  [TextBox_144] : Thyroid nodule, Hypothyroidism

## 2020-10-14 NOTE — HISTORY OF PRESENT ILLNESS
[Former] : former [< 30 pack-years] : < 30 pack-years [Daytime Somnolence] : daytime somnolence [Snoring] : snoring [TextBox_4] : 70-year-old female initial visit now done as a telemedicine visit\par Complicated pulmonary history to be reviewed\par There is a diagnosis of asthma and she reports bronchiectasis with multiple histories of pneumonia at least 4-5 reported last very ill November December January with an additional illness in March\par She reports abnormal chest CTs and chest x-rays\par Last chest CT she reports was approximately 2018 where she was told of having scarlike tissue\par With these episodes she describes cough chest tightness wheezing sputum production\par Medications through the winter included Y UL P DARION in the nebulizer, Brovana right axilla sodium chloride nebulizer nasal Astelin nasal Flonase albuterol HFA course of prednisone and antibiotics including doxycycline x2 courses and Zithromax.\par \par She states 1 to 1-1/2 weeks ago she developed some trouble breathing self started treatment with albuterol\par She did not receive any antibiotics or steroids with this symptomatology most recently noted\par She does not report at present any sputum production for the past several days and it is clear without hemoptysis purulence or foul smelling\par She also states that she was sick in March there was some question whether she could have COVID-19 but a COVID  19 PCR and antibody testing subsequently was negative\par Other significant history includes a bronchoscopy dating back to April 2019 which was negative including AFB fungus mold and  negative TBLBx.\par Last chest x-ray available for review dates back to April 16, 2019 which was status post a transbronchial biopsy.\par Thickening of the minor fissure which was noted and reported chronic\par Reticulonodular opacities right midlung\par Ill-defined focal opacity periphery left upper to mid lung with some central extension reported to the left hilum with out pneumothorax pleural effusion or significant volume loss.\par \par Additional history\par Right breast DCIS treated radiation therapy 2017\par Childhood polio\par Mild mitral regurgitation\par Hypothyroidism\par Nocturnal myoclonus\par \par Vaccination profile flu vaccinations up-to-date\par 2018 2019 up-to-date with Prevnar and PCV 23\par \par  [YearQuit] : 1979 [TextBox_11] : 1

## 2020-10-14 NOTE — PROCEDURE
[FreeTextEntry1] : PFT with body box August 12, 2020\par Normal flow rates\par Mild obstructive pattern with an FEV1 FVC 74\par No bronchodilator response at FEV1\par 20% response at small airways\par Normal lung volumes are\par Noted increased % predicted.\par Specific inductance and resistance normal\par Diffusion normal 95% predicted.\par Hemoglobin 13.79\par \par Pulmonary 6-minute walk step stress test August 12, 2020\par Total steps 1/10/2017\par Baseline room air O2 saturation 96%\par Desaturation at minute 689% with immediate recovery to 96%\par Impression minimal O2 desaturation\par No indication for portable oxygen therapy\par \par Chest x-ray PA lateral August 12, 2020\par Normal cardiac size\par Scarring left lower lung zone cannot exclude component of bronchiectasis\par Right lung is otherwise clear\par No evidence for jodi adenopathy\par No dominant pulmonary nodules or pneumothorax\par Impression bronchiectatic change left lower lung zone\par \par NIOX 14 ppb\par \par Blood Draw\par Persistent mild reduction IgA normal range 70\par Data review\par Alpha-1 antitrypsin titer negative\par 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

## 2020-11-06 ENCOUNTER — NON-APPOINTMENT (OUTPATIENT)
Age: 70
End: 2020-11-06

## 2020-11-14 LAB — SARS-COV-2 N GENE NPH QL NAA+PROBE: NOT DETECTED

## 2020-11-16 ENCOUNTER — APPOINTMENT (OUTPATIENT)
Dept: ULTRASOUND IMAGING | Facility: IMAGING CENTER | Age: 70
End: 2020-11-16
Payer: MEDICARE

## 2020-11-16 ENCOUNTER — APPOINTMENT (OUTPATIENT)
Dept: MAMMOGRAPHY | Facility: IMAGING CENTER | Age: 70
End: 2020-11-16
Payer: MEDICARE

## 2020-11-16 ENCOUNTER — OUTPATIENT (OUTPATIENT)
Dept: OUTPATIENT SERVICES | Facility: HOSPITAL | Age: 70
LOS: 1 days | End: 2020-11-16
Payer: MEDICARE

## 2020-11-16 DIAGNOSIS — Z00.8 ENCOUNTER FOR OTHER GENERAL EXAMINATION: ICD-10-CM

## 2020-11-16 DIAGNOSIS — Z90.49 ACQUIRED ABSENCE OF OTHER SPECIFIED PARTS OF DIGESTIVE TRACT: Chronic | ICD-10-CM

## 2020-11-16 PROCEDURE — 76641 ULTRASOUND BREAST COMPLETE: CPT | Mod: 26,50

## 2020-11-16 PROCEDURE — 77066 DX MAMMO INCL CAD BI: CPT | Mod: 26

## 2020-11-16 PROCEDURE — 77066 DX MAMMO INCL CAD BI: CPT

## 2020-11-16 PROCEDURE — 76641 ULTRASOUND BREAST COMPLETE: CPT

## 2020-11-16 PROCEDURE — G0279: CPT | Mod: 26

## 2020-11-16 PROCEDURE — G0279: CPT

## 2020-11-18 ENCOUNTER — APPOINTMENT (OUTPATIENT)
Dept: PULMONOLOGY | Facility: CLINIC | Age: 70
End: 2020-11-18
Payer: MEDICARE

## 2020-11-18 VITALS
OXYGEN SATURATION: 96 % | RESPIRATION RATE: 16 BRPM | HEART RATE: 68 BPM | TEMPERATURE: 207.14 F | HEIGHT: 66 IN | DIASTOLIC BLOOD PRESSURE: 80 MMHG | BODY MASS INDEX: 21.69 KG/M2 | WEIGHT: 135 LBS | SYSTOLIC BLOOD PRESSURE: 131 MMHG

## 2020-11-18 DIAGNOSIS — G47.30 SLEEP APNEA, UNSPECIFIED: ICD-10-CM

## 2020-11-18 PROCEDURE — 99214 OFFICE O/P EST MOD 30 MIN: CPT | Mod: 25

## 2020-11-18 PROCEDURE — 94727 GAS DIL/WSHOT DETER LNG VOL: CPT

## 2020-11-18 PROCEDURE — 95012 NITRIC OXIDE EXP GAS DETER: CPT

## 2020-11-18 PROCEDURE — 94010 BREATHING CAPACITY TEST: CPT

## 2020-11-18 PROCEDURE — ZZZZZ: CPT

## 2020-11-18 PROCEDURE — 94729 DIFFUSING CAPACITY: CPT

## 2020-11-18 NOTE — HISTORY OF PRESENT ILLNESS
[Former] : former [< 30 pack-years] : < 30 pack-years [Daytime Somnolence] : daytime somnolence [Snoring] : snoring [TextBox_4] : 70-year-old female \par Complicated pulmonary history to be reviewed\par There is a diagnosis of asthma and she reports bronchiectasis with multiple histories of pneumonia at least 4-5 reported last very ill November December January with an additional illness in March\par She reports abnormal chest CTs and chest x-rays\par Last chest CT she reports was approximately 2018 where she was told of having scarlike tissue\par With these episodes she describes cough chest tightness wheezing sputum production\par Medications through the winter included Y UL P DARION in the nebulizer, Brovana right axilla sodium chloride nebulizer nasal Astelin nasal Flonase albuterol HFA course of prednisone and antibiotics including doxycycline x2 courses and Zithromax.\par \par She states 1 to 1-1/2 weeks ago she developed some trouble breathing self started treatment with albuterol\par She did not receive any antibiotics or steroids with this symptomatology most recently noted\par She does not report at present any sputum production for the past several days and it is clear without hemoptysis purulence or foul smelling\par She also states that she was sick in March there was some question whether she could have COVID-19 but a COVID  19 PCR and antibody testing subsequently was negative\par Other significant history includes a bronchoscopy dating back to April 2019 which was negative including AFB fungus mold and  negative TBLBx.\par Last chest x-ray available for review dates back to April 16, 2019 which was status post a transbronchial biopsy.\par Thickening of the minor fissure which was noted and reported chronic\par Reticulonodular opacities right midlung\par Ill-defined focal opacity periphery left upper to mid lung with some central extension reported to the left hilum with out pneumothorax pleural effusion or significant volume loss.\par \par Additional history\par Right breast DCIS treated radiation therapy 2017\par Childhood polio\par Mild mitral regurgitation\par Hypothyroidism\par Nocturnal myoclonus\par \par Vaccination profile flu vaccinations up-to-date\par 2018 2019 up-to-date with Prevnar and PCV 23\par \par  [TextBox_11] : 1 [YearQuit] : 1979

## 2020-11-18 NOTE — PROCEDURE
[FreeTextEntry1] : PFT with body box August 12, 2020\par Normal flow rates\par Mild obstructive pattern with an FEV1 FVC 74\par No bronchodilator response at FEV1\par 20% response at small airways\par Normal lung volumes are\par Noted increased % predicted.\par Specific inductance and resistance normal\par Diffusion normal 95% predicted.\par Hemoglobin 13.79\par \par Pulmonary 6-minute walk step stress test August 12, 2020\par Total steps 1/10/2017\par Baseline room air O2 saturation 96%\par Desaturation at minute 6  89% with immediate recovery to 96%\par Impression minimal O2 desaturation\par No indication for portable oxygen therapy\par \par Chest x-ray PA lateral August 12, 2020\par Normal cardiac size\par Scarring left lower lung zone cannot exclude component of bronchiectasis\par Right lung is otherwise clear\par No evidence for jodi adenopathy\par No dominant pulmonary nodules or pneumothorax\par Impression bronchiectatic change left lower lung zone\par \par NIOX 14 ppb\par \par Blood Draw\par Persistent mild reduction IgA normal range 70\par Data review\par Alpha-1 antitrypsin titer negative\par 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

## 2020-11-18 NOTE — DISCUSSION/SUMMARY
[FreeTextEntry1] : Asthma\par Abnormal CXR CT CHEST\par Bronchiectasis\par IgA deficiency\par Hx as  noted\par hypothyroid\par Childhood polio\par Mild MR\par 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\par noted just below normal IGA and low IgG 2 but demonstrates AB to strep  and tetanus\par \par Recommendations\par PFT 3 mos\par 6 min walk- f/u 3 months\par NIOX\par TX\par restart Wixela 250-50 mcg 1 puff BID and Rinse- onlu using QD sec leg  cramps plus voice issue\par CHANGE LAMA Sample Spiriva 2.5 mcg 2 puffs QD\par  prn Albuterol\par  NACL in NEB only BID\par no indication Antibx\par Issue immuno serology IGA and IgG  with subtypes A1AT titer HP panel and possible autoimmune serology- reviewed\par To consider VEST therapy if Bronchiectasis confirmed  with CT CHEST but requests to hold\par Pneumonia vaccines up to  date post 67\par \par  issue r/o MAEGAN on hold

## 2020-11-18 NOTE — REVIEW OF SYSTEMS
[Postnasal Drip] : postnasal drip [Cough] : cough [Chest Tightness] : chest tightness [Frequent URIs] : frequent URIs [Sputum] : sputum [Wheezing] : wheezing [Nasal Discharge] : nasal discharge [Negative] : Psychiatric [Fever] : no fever [Fatigue] : no fatigue [Recent Wt Gain (___ Lbs)] : ~T no recent weight gain [Chills] : no chills [Poor Appetite] : no poor appetite [Hemoptysis] : no hemoptysis [Dyspnea] : no dyspnea [Pleuritic Pain] : no pleuritic pain [A.M. Dry Mouth] : no a.m. dry mouth [SOB on Exertion] : no sob on exertion [Headache] : no headache [Focal Weakness] : no focal weakness [Seizures] : no seizures [Dizziness] : no dizziness [Numbness] : no numbness [Memory Loss] : no memory loss [Paralysis] : no paralysis [Confusion] : no confusion [Diabetes] : no diabetes [TextBox_44] : MR [TextBox_83] : History renal angiomyolipoma [TextBox_122] : nocturnal myoclonus, reports polio as a child without residual  [TextBox_144] : Thyroid nodule, Hypothyroidism

## 2020-12-03 ENCOUNTER — TRANSCRIPTION ENCOUNTER (OUTPATIENT)
Age: 70
End: 2020-12-03

## 2020-12-07 ENCOUNTER — APPOINTMENT (OUTPATIENT)
Dept: OBGYN | Facility: CLINIC | Age: 70
End: 2020-12-07

## 2020-12-09 ENCOUNTER — NON-APPOINTMENT (OUTPATIENT)
Age: 70
End: 2020-12-09

## 2020-12-10 ENCOUNTER — NON-APPOINTMENT (OUTPATIENT)
Age: 70
End: 2020-12-10

## 2020-12-14 ENCOUNTER — APPOINTMENT (OUTPATIENT)
Dept: OBGYN | Facility: CLINIC | Age: 70
End: 2020-12-14
Payer: MEDICARE

## 2020-12-14 VITALS
HEART RATE: 74 BPM | BODY MASS INDEX: 22.02 KG/M2 | DIASTOLIC BLOOD PRESSURE: 83 MMHG | WEIGHT: 137 LBS | TEMPERATURE: 208.4 F | SYSTOLIC BLOOD PRESSURE: 129 MMHG | HEIGHT: 66 IN

## 2020-12-14 PROCEDURE — G0101: CPT

## 2020-12-16 ENCOUNTER — APPOINTMENT (OUTPATIENT)
Dept: PULMONOLOGY | Facility: CLINIC | Age: 70
End: 2020-12-16
Payer: MEDICARE

## 2020-12-16 VITALS
DIASTOLIC BLOOD PRESSURE: 84 MMHG | HEART RATE: 71 BPM | TEMPERATURE: 208.76 F | RESPIRATION RATE: 15 BRPM | SYSTOLIC BLOOD PRESSURE: 124 MMHG | OXYGEN SATURATION: 96 %

## 2020-12-16 PROCEDURE — 99214 OFFICE O/P EST MOD 30 MIN: CPT

## 2020-12-16 NOTE — PROCEDURE
[FreeTextEntry1] : PFT with body box August 12, 2020\par Normal flow rates\par Mild obstructive pattern with an FEV1 FVC 74\par No bronchodilator response at FEV1\par 20% response at small airways\par Normal lung volumes are\par Noted increased % predicted.\par Specific inductance and resistance normal\par Diffusion normal 95% predicted.\par Hemoglobin 13.79\par \par Pulmonary 6-minute walk step stress test August 12, 2020\par Total steps 1/10/2017\par Baseline room air O2 saturation 96%\par Desaturation at minute 6  89% with immediate recovery to 96%\par Impression minimal O2 desaturation\par No indication for portable oxygen therapy\par \par Chest x-ray PA lateral August 12, 2020\par Normal cardiac size\par Scarring left lower lung zone cannot exclude component of bronchiectasis\par Right lung is otherwise clear\par No evidence for jodi adenopathy\par No dominant pulmonary nodules or pneumothorax\par Impression bronchiectatic change left lower lung zone\par \par Blood Draw\par Persistent mild reduction IgA normal range 70\par Data review\par Alpha-1 antitrypsin titer negative\par 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

## 2020-12-16 NOTE — HISTORY OF PRESENT ILLNESS
[Former] : former [< 30 pack-years] : < 30 pack-years [Daytime Somnolence] : daytime somnolence [Snoring] : snoring [TextBox_4] : post Fall 3 weeks ago and noted rib fx Right\par  Otherwise states doing very well with Wixela\par  No inc use KARLOS\par \par 70-year-old female \par Complicated pulmonary history to be reviewed\par There is a diagnosis of asthma and she reports bronchiectasis with multiple histories of pneumonia at least 4-5 reported last very ill November December January with an additional illness in March\par She reports abnormal chest CTs and chest x-rays\par Last chest CT she reports was approximately 2018 where she was told of having scarlike tissue\par With these episodes she describes cough chest tightness wheezing sputum production\par Medications through the winter included Y UL P DARION in the nebulizer, Brovana right axilla sodium chloride nebulizer nasal Astelin nasal Flonase albuterol HFA course of prednisone and antibiotics including doxycycline x2 courses and Zithromax.\par \par She states 1 to 1-1/2 weeks ago she developed some trouble breathing self started treatment with albuterol\par She did not receive any antibiotics or steroids with this symptomatology most recently noted\par She does not report at present any sputum production for the past several days and it is clear without hemoptysis purulence or foul smelling\par She also states that she was sick in March there was some question whether she could have COVID-19 but a COVID  19 PCR and antibody testing subsequently was negative\par Other significant history includes a bronchoscopy dating back to April 2019 which was negative including AFB fungus mold and  negative TBLBx.\par Last chest x-ray available for review dates back to April 16, 2019 which was status post a transbronchial biopsy.\par Thickening of the minor fissure which was noted and reported chronic\par Reticulonodular opacities right midlung\par Ill-defined focal opacity periphery left upper to mid lung with some central extension reported to the left hilum with out pneumothorax pleural effusion or significant volume loss.\par \par Additional history\par Right breast DCIS treated radiation therapy 2017\par Childhood polio\par Mild mitral regurgitation\par Hypothyroidism\par Nocturnal myoclonus\par \par Vaccination profile flu vaccinations up-to-date\par 2018 2019 up-to-date with Prevnar and PCV 23\par \par  [TextBox_11] : 1 [YearQuit] : 1981

## 2021-01-01 NOTE — ASU DISCHARGE PLAN (ADULT/PEDIATRIC) - PHYSICIAN SECTION COMPLETE
Called pt mother and advised I did not give tylenol and she can do so if he is cranky and bothered by the vaccines    [email or web address]
Patient's mom called and wanted to know if you gave him tylenol while he was here for his shots  If not she wants to give him some because he is cranky 
Yes

## 2021-01-18 ENCOUNTER — APPOINTMENT (OUTPATIENT)
Dept: PULMONOLOGY | Facility: CLINIC | Age: 71
End: 2021-01-18
Payer: MEDICARE

## 2021-01-18 VITALS
SYSTOLIC BLOOD PRESSURE: 141 MMHG | OXYGEN SATURATION: 96 % | HEART RATE: 80 BPM | TEMPERATURE: 208.76 F | DIASTOLIC BLOOD PRESSURE: 79 MMHG

## 2021-01-18 PROCEDURE — 99213 OFFICE O/P EST LOW 20 MIN: CPT

## 2021-01-18 NOTE — REVIEW OF SYSTEMS
[Fever] : no fever [Fatigue] : no fatigue [Recent Wt Gain (___ Lbs)] : ~T no recent weight gain [Chills] : no chills [Poor Appetite] : no poor appetite [Cough] : cough [Postnasal Drip] : postnasal drip [Hemoptysis] : no hemoptysis [Chest Tightness] : chest tightness [Frequent URIs] : frequent URIs [Sputum] : sputum [Dyspnea] : no dyspnea [Pleuritic Pain] : no pleuritic pain [Wheezing] : wheezing [A.M. Dry Mouth] : no a.m. dry mouth [SOB on Exertion] : no sob on exertion [Nasal Discharge] : nasal discharge [Focal Weakness] : no focal weakness [Headache] : no headache [Seizures] : no seizures [Dizziness] : no dizziness [Numbness] : no numbness [Memory Loss] : no memory loss [Paralysis] : no paralysis [Confusion] : no confusion [Diabetes] : no diabetes [Negative] : Psychiatric [TextBox_44] : MR [TextBox_83] : History renal angiomyolipoma [TextBox_144] : Thyroid nodule, Hypothyroidism [TextBox_122] : nocturnal myoclonus, reports polio as a child without residual

## 2021-01-18 NOTE — DISCUSSION/SUMMARY
[FreeTextEntry1] : Asthma\par Abnormal CXR CT CHEST\par Bronchiectasis\par IgA deficiency\par Hx as  noted\par hypothyroid\par Childhood polio\par Mild MR\par 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\par noted just below normal IGA and low IgG 2 but demonstrates AB to strep  and tetanus\par \par Recommendations\par PFT 3 mos\par 6 min walk- f/u 3 months\par NIOX\par TX\par restart Wixela 250-50 mcg 1 puff BID and Rinse- onlu using QD sec leg  cramps plus voice issue\par CHANGE LAMA Sample Spiriva 2.5 mcg 2 puffs QD\par  prn Albuterol\par  NACL in NEB only BID\par no indication Antibx\par Issue immuno serology IGA and IgG  with subtypes A1AT titer HP panel and possible autoimmune serology- reviewed\par To consider VEST therapy if Bronchiectasis confirmed  with CT CHEST but requests to hold\par Pneumonia vaccines up to  date post 67\par \par  issue r/o MAEGAN on hold per pt request

## 2021-01-18 NOTE — HISTORY OF PRESENT ILLNESS
[Former] : former [< 30 pack-years] : < 30 pack-years [TextBox_4] : post Fall 3 weeks ago and noted rib fx Right\par  Otherwise states doing very well with Wixela\par  No inc use KARLOS\par \par 70-year-old female \par Complicated pulmonary history to be reviewed\par There is a diagnosis of asthma and she reports bronchiectasis with multiple histories of pneumonia at least 4-5 reported last very ill November December January with an additional illness in March\par She reports abnormal chest CTs and chest x-rays\par Last chest CT she reports was approximately 2018 where she was told of having scarlike tissue\par With these episodes she describes cough chest tightness wheezing sputum production\par Medications through the winter included Y UL P DARION in the nebulizer, Brovana right axilla sodium chloride nebulizer nasal Astelin nasal Flonase albuterol HFA course of prednisone and antibiotics including doxycycline x2 courses and Zithromax.\par \par She states 1 to 1-1/2 weeks ago she developed some trouble breathing self started treatment with albuterol\par She did not receive any antibiotics or steroids with this symptomatology most recently noted\par She does not report at present any sputum production for the past several days and it is clear without hemoptysis purulence or foul smelling\par She also states that she was sick in March there was some question whether she could have COVID-19 but a COVID  19 PCR and antibody testing subsequently was negative\par Other significant history includes a bronchoscopy dating back to April 2019 which was negative including AFB fungus mold and  negative TBLBx.\par Last chest x-ray available for review dates back to April 16, 2019 which was status post a transbronchial biopsy.\par Thickening of the minor fissure which was noted and reported chronic\par Reticulonodular opacities right midlung\par Ill-defined focal opacity periphery left upper to mid lung with some central extension reported to the left hilum with out pneumothorax pleural effusion or significant volume loss.\par \par Additional history\par Right breast DCIS treated radiation therapy 2017\par Childhood polio\par Mild mitral regurgitation\par Hypothyroidism\par Nocturnal myoclonus\par \par Vaccination profile flu vaccinations up-to-date\par 2018 2019 up-to-date with Prevnar and PCV 23\par \par  [TextBox_11] : 1 [YearQuit] : 1981

## 2021-03-19 ENCOUNTER — NON-APPOINTMENT (OUTPATIENT)
Age: 71
End: 2021-03-19

## 2021-04-07 ENCOUNTER — LABORATORY RESULT (OUTPATIENT)
Age: 71
End: 2021-04-07

## 2021-04-07 ENCOUNTER — RESULT REVIEW (OUTPATIENT)
Age: 71
End: 2021-04-07

## 2021-04-07 ENCOUNTER — APPOINTMENT (OUTPATIENT)
Dept: GASTROENTEROLOGY | Facility: CLINIC | Age: 71
End: 2021-04-07
Payer: MEDICARE

## 2021-04-07 VITALS
SYSTOLIC BLOOD PRESSURE: 132 MMHG | OXYGEN SATURATION: 96 % | RESPIRATION RATE: 16 BRPM | DIASTOLIC BLOOD PRESSURE: 78 MMHG | HEART RATE: 76 BPM | HEIGHT: 66 IN | WEIGHT: 139 LBS | BODY MASS INDEX: 22.34 KG/M2 | TEMPERATURE: 207.14 F

## 2021-04-07 DIAGNOSIS — G89.29 LEFT UPPER QUADRANT PAIN: ICD-10-CM

## 2021-04-07 DIAGNOSIS — K76.0 FATTY (CHANGE OF) LIVER, NOT ELSEWHERE CLASSIFIED: ICD-10-CM

## 2021-04-07 DIAGNOSIS — R16.0 HEPATOMEGALY, NOT ELSEWHERE CLASSIFIED: ICD-10-CM

## 2021-04-07 DIAGNOSIS — R10.12 LEFT UPPER QUADRANT PAIN: ICD-10-CM

## 2021-04-07 DIAGNOSIS — K52.9 NONINFECTIVE GASTROENTERITIS AND COLITIS, UNSPECIFIED: ICD-10-CM

## 2021-04-07 PROCEDURE — 36415 COLL VENOUS BLD VENIPUNCTURE: CPT

## 2021-04-07 PROCEDURE — 99214 OFFICE O/P EST MOD 30 MIN: CPT | Mod: 25

## 2021-04-07 NOTE — REASON FOR VISIT
[Follow-Up: _____] : a [unfilled] follow-up visit [FreeTextEntry1] : for follow-up of ultrasound with incidental finding of hepatomegaly.

## 2021-04-07 NOTE — HISTORY OF PRESENT ILLNESS
[FreeTextEntry1] : Office revisit on 2021.\par \par Present for followup regarding recent ultrasound with incidental finding of hepatomegaly.\par \par PREVIOUS HISTORY:\par \par This patient's history of an epigastric burning pain has been intermittent and episodic of many years duration. The patient previously underwent an upper endoscopy on 05 at which time nonerosive antral erythema was noted. Testing for Helicobacter pylori was negative. A duodenal biopsy was normal without any features of celiac sprue. The patient has utilized TUMS. She reports no other medical therapy.\par \par The patient underwent a colonoscopy on 05 which was normal except for hemorrhoids. \par \par A colonoscopy on 6/28/10 to evaluate hematochezia revealed a 3 mm cecal adenoma, a nonbleeding proximal ascending colon angioectasia and hemorrhoids. \par \par The patient has a family history of colon cancer and she indicates her mother was diagnosed at age 69. Her sister was treated for thyroid cancer and endometrial cancer.\par \par The patient underwent a laparoscopic cholecystectomy in approximately . She had gallstones. She indicates that her previous biliary pain was epigastric and right upper quadrant and was somewhat different from her epigastric burning pain. That previous pain resolved.\par \par The patient has a history of an increase in liver enzymes. She reports this has been very mild. She is unclear as to the etiology for this.\par \par Colonoscopy on 14 was noted for the detection of a very sessile approximately 3 cm flat cecal polyp consistent with a sessile serrated polyp. An associated 6 mm apparent flexure sessile serrated polyp with no dysplasia was removed.\par \par EGD on 14 revealed a normal esophagus. Mild patchy antral erythema was noted. Gastric biopsy was normal. Testing for Helicobacter pylori was negative. Visualized duodenum was normal.\par \par Colonoscopy on 2014 by Dr. Shine Zaragoza was note for removal of a 2 cm cecal sessile serrated polyp.\par \par Colonoscopy on 1/15/16 was normal except for hemorrhoids.\par \par CURRENT HISTORY:\par \par ORV 3/31/17:    -The patient complains of chronic diarrhea. Typically has between 1-4 diarrheal stools daily. Various consistency. Can be soft or even watery. Most of the time the stools are soft but 1-2 times per week she will have a watery stool. Never observed blood, mucus or oil. Fatty foods, red sauces, beef, cheese, bread or any fatty or oily food is provocative. Does better on a more bland diet such as chicken, vegetables and yogurt. Will subsequently have a more normal bowel movement.\par \par                          -Long-standing complaint of epigastric burning discomfort. In association with epigastric burning sensation this will be alleviated after moving her bowels. May experience  left upper quadrant discomfort before her bowel movement.\par \par                         -Frequency of diarrhea increased following cholecystectomy.\par \par                         -Denies fever. Stable appetite. Weight gain of 10-12 pound reported. Denies any complaints of dysphagia, heartburn, nausea or vomiting. Has chronic symptoms of bloating, gas and excessive flatus.\par \par ORV 17:    -Continues to experience diarrhea. Now described as mostly diarrhea but with intermittent episodes of constipation.\par                         -Diarrhea described as approximately 3 loose bowel movements of variable consistency that can range from being watery to mushy. Mostly occurs in the morning before or after breakfast. Typically does better later in the day and patient denies nocturnal occurrence. Fatty foods can be provocative of the diarrhea. Never observes blood or oil in the stool. Approximately 1 day out of the week she feels constipated and describes straining with passage of a formed brown stool. Of note, the patient does not take Imodium, Lomotil or Kaopectate to remedy the diarrhea.\par                         -Experiences a left-sided abdominal pain described as a constant ache that fluctuates in severity and which is nonradiating. Affects the left upper quadrant, left midabdomen and left lower quadrant. No precipitating factors. Possibly increased in severity after ingestion of bread and pasta. Patient is concerned regarding gluten sensitivity. Discomfort is also somewhat increased before and after moving her bowels. Abdominal discomfort is somewhat less if she limits her self to a bland diet such as eggs, salad and vegetables. The discomfort is moderate in severity rated at 4/10. Never awakens her from sleep.\par                       -Denies fever or oral ulcers. Stable appetite. Patient reports a 10 pound weight increase over the past 2 years. Reports her weight gain is from "eating too much". Denies any complaints of dysphagia, heartburn, nausea or vomiting. Does experience gaseous symptoms of bloating and distention which is diffuse and different from her left side abdominal pain. Patient under increased stress.\par \par ORV 10/16/18:     -For the past year the patient reports experiencing an intermittent left upper quadrant pain primarily localized to the left costal margin region. Intermittent discomfort has been described typically postprandial and often exacerbated by gluten ingestion. She feels better after restricting gluten products. Some decrease in discomfort also noted after bowel movement. This is described as a nonradiating aching-like discomfort.\par                -Appetite and weight are stable. Denies any complaints of dysphagia, heartburn, nausea or vomiting. The patient typically has 2 formed bowel movements daily. Occasionally observes thin stools. On occasion she can have intermittent episodes of diarrhea sometime exacerbated by meat ingestion. Denies any rectal bleeding. Never has constipation.\par                   -The patient indicates experiencing recurrent episodes of pneumonia. Over the past year or so she has had 4 episodes. Patient was treated for pneumonia 2017 and 2018. Following the last episode the patient was evaluated by pulmonary and treated with a regimen of steroids which have since been discontinued. Patient is still on symbicort. She occasionally feels a sense of tightness in her chest with deep breath. However, she denies dyspnea, cough, wheezing or chest pain at the current time. She can occasionally experienced postnasal drip and utilizes nasal irrigation. Her exercise tolerance is good and she can walk one to 2 miles without difficulty.\par                -The patient was treated for a right breast cancer in situ and underwent surgery 2017 and completed radiation therapy in 2018.\par \par COLONOSCOPY 2019:     - Surveillance colonoscopy was performed on 19. Patient previously had removal of a 2 cm sessile serrated colon polyp. Family history of colon cancer reported with patient's mother diagnosed at age 69. Patient's last colonoscopy of 2016 did not reveal metachronous colon polyps. In addition, patient has diarrhea following cholecystectomy.\par                                               -Total colonoscopy was performed to the cecum with ileoscopy. Normal terminal ileum. Normal colonoscopy examination except for hemorrhoids. Angulation and fixation was noted in the region of the sigmoid likely related to prior  section and hysterectomy. Colonoscopy examination was completed with switch to a pediatric colonoscope. Surveillance colonoscopy is advised in 3 years. \par \par ORV 2021:     -Had abdominal ultrasound on 2020 apparently for assessment of possible rib fracture.  Report noted for mildly diffuse hepatic hyperechoic features consistent with fatty infiltration.  Liver at upper normal size measuring 14.6 cm.  Focal hepatic lesions not detected.  Patient status post cholecystectomy.  Previously had laboratory assessment on 2020 noted for normal liver enzymes except for borderline elevated total bilirubin including total protein 7.1, albumin 4.1, bilirubin 1.3, alkaline phosphatase 50, AST 22 and ALT 21.  Hemoglobin A1c was borderline elevated at 5.8.  CBC was normal including platelet count of 290,000.\par                             -Since ultrasound findings patient has decreased alcohol intake.  Of note, had been drinking up to 2 glasses of wine daily.  Now has cut back to 1 glass few times per week.  In addition, also has somewhat modified diet with avoidance of fatty foods.  Trying to exercise more.\par                             -Describes respiratory illness from 2019 up until 2020 with complaints of dyspnea, fatigue, myalgias and arthralgias.  Questions whether she might have had Covid but no testing available at that time.  Subsequent antibody testing negative.  In association with the symptoms was experiencing gas and bloating.\par                             -Feels better at current time.  Denies fever.  Appetite and weight are stable.  No dysphagia to liquids.  She has a longstanding occasional sensation of transient bolus hang up to solid foods at sternal notch region but this has been of many years duration and has not been progressive.  On occasion can experience a scratchy throat and sensation of vocal hoarseness.  Not currently experiencing any complaints of heartburn, nausea or vomiting.\par                            -Has history of chronic diarrhea.  Typically has between 1-4 diarrheal [place]om[place] 5-6 bowel movements daily.  Avoids gluten products which she feels helps.  Of note, patient is on a regimen of magnesium, fish oil and flaxseed.  Denies rectal bleeding.\par                            -Patient has a longstanding complaint of intermittent left upper quadrant abdominal pain almost at left costal margin.  Episodes occur approximately 3-4 times per week.  Lasts approximately 1 hour and is described as a nonradiating achy pain.  Questions whether gluten, beef or chocolate can be provocative.  As noted, this has been longstanding and has been nonprogressive.\par                              -Patient indicates some type of injury 2020 prompting concern over rib fracture leading to ultrasound revealing current hepatic findings.\par

## 2021-04-07 NOTE — CONSULT LETTER
[Dear  ___] : Dear  [unfilled], [Consult Letter:] : I had the pleasure of evaluating your patient, [unfilled]. [Please see my note below.] : Please see my note below. [Consult Closing:] : Thank you very much for allowing me to participate in the care of this patient.  If you have any questions, please do not hesitate to contact me. [Sincerely,] : Sincerely, [FreeTextEntry2] : Dr. Joel Espinoza [FreeTextEntry3] : Juan Stewart M.D.

## 2021-04-07 NOTE — ASSESSMENT
[FreeTextEntry1] : Impression:\par \par #1 hepatic steatosis–ultrasound of 2020 noted for mildly diffuse hepatic hyperechoic features consistent with fatty infiltration with borderline hepatomegaly at 14 point centimeters.  Focal hepatic lesions not detected.  Alcohol likely contributory as well as possible metabolic predisposition.\par \par #2 chronic diarrhea-temporally related to cholecystectomy. Cholereic postcholecystectomy diarrhea is suspected.  Exacerbated by supplements–magnesium, flaxseed, fish oil.  Reports some improvement with gluten restriction–no indication of celiac disease but possible nonceliac gluten sensitivity or more likely improvement with avoidance of FODMAP containing foods.  Colonic biopsies at 2019 colonoscopy normal without indication of microscopic, collagenous or lymphocytic colitis.\par \par #3 abdominal pain- left-sided abdominal discomfort affecting LUQ.  Chronic, longstanding and nonprogressive.  Likely functional etiology.  In addition, somewhat localized to left costal margin region raising question as to musculoskeletal etiology.\par \par #4 history of colonic polyps-status post removal of a 2 cm sessile serrated cecal polyp at the 14 colonoscopy. Normal surveillance colonoscopy performed on 1/15/16. Also with history of previous colonic adenoma.  Colonoscopy on 2019 was normal except for hemorrhoids.\par \par #5 status post laparoscopic cholecystectomy in .\par \par #6 history of elevated liver enzymes–normal liver enzymes at assessment of 2020 except for borderline total bilirubin (possibly Gilbert's).\par \par General medical problems:\par \par - status post LUZ MARINA/BSO.\par \par - status post  section x 2.\par \par - hypothyroidism- history of Hashimoto's thyroiditis.\par \par - family history of colon cancer-mother diagnosed at age 69.  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

## 2021-04-07 NOTE — PHYSICAL EXAM
[General Appearance - Alert] : alert [General Appearance - In No Acute Distress] : in no acute distress [General Appearance - Well Nourished] : well nourished [General Appearance - Well Developed] : well developed [General Appearance - Well-Appearing] : healthy appearing [Sclera] : the sclera and conjunctiva were normal [Neck Appearance] : the appearance of the neck was normal [Neck Cervical Mass (___cm)] : no neck mass was observed [Respiration, Rhythm And Depth] : normal respiratory rhythm and effort [Exaggerated Use Of Accessory Muscles For Inspiration] : no accessory muscle use [Auscultation Breath Sounds / Voice Sounds] : lungs were clear to auscultation bilaterally [Heart Rate And Rhythm] : heart rate was normal and rhythm regular [Heart Sounds] : normal S1 and S2 [Heart Sounds Gallop] : no gallops [Murmurs] : no murmurs [Heart Sounds Pericardial Friction Rub] : no pericardial rub [Edema] : there was no peripheral edema [Bowel Sounds] : normal bowel sounds [Abdomen Soft] : soft [Abdomen Tenderness] : non-tender [] : no hepato-splenomegaly [Abdomen Mass (___ Cm)] : no abdominal mass palpated [Abdomen Hernia] : no hernia was discovered [Cervical Lymph Nodes Enlarged Posterior Bilaterally] : posterior cervical [Cervical Lymph Nodes Enlarged Anterior Bilaterally] : anterior cervical [Supraclavicular Lymph Nodes Enlarged Bilaterally] : supraclavicular [Abnormal Walk] : normal gait [Nail Clubbing] : no clubbing  or cyanosis of the fingernails [Skin Color & Pigmentation] : normal skin color and pigmentation [Oriented To Time, Place, And Person] : oriented to person, place, and time [Impaired Insight] : insight and judgment were intact [Affect] : the affect was normal [Mood] : the mood was normal [FreeTextEntry1] : Stigmata of chronic liver disease was not detected.

## 2021-04-08 LAB
ALBUMIN SERPL ELPH-MCNC: 4.4 G/DL
ALP BLD-CCNC: 73 U/L
ALT SERPL-CCNC: 34 U/L
ANION GAP SERPL CALC-SCNC: 11 MMOL/L
AST SERPL-CCNC: 34 U/L
BILIRUB SERPL-MCNC: 1.1 MG/DL
BUN SERPL-MCNC: 23 MG/DL
CALCIUM SERPL-MCNC: 9.7 MG/DL
CHLORIDE SERPL-SCNC: 99 MMOL/L
CHOLEST SERPL-MCNC: 189 MG/DL
CO2 SERPL-SCNC: 28 MMOL/L
CREAT SERPL-MCNC: 0.94 MG/DL
HBV CORE IGG+IGM SER QL: NONREACTIVE
HBV SURFACE AB SER QL: NONREACTIVE
HBV SURFACE AG SER QL: NONREACTIVE
HCV AB SER QL: NONREACTIVE
HCV S/CO RATIO: 0.04 S/CO
HDLC SERPL-MCNC: 81 MG/DL
HEPATITIS A IGG ANTIBODY: NONREACTIVE
IRON SATN MFR SERPL: 43 %
IRON SERPL-MCNC: 125 UG/DL
LDLC SERPL CALC-MCNC: 95 MG/DL
NONHDLC SERPL-MCNC: 108 MG/DL
POTASSIUM SERPL-SCNC: 4.5 MMOL/L
PROT SERPL-MCNC: 7.1 G/DL
SODIUM SERPL-SCNC: 138 MMOL/L
TIBC SERPL-MCNC: 293 UG/DL
TRIGL SERPL-MCNC: 66 MG/DL
TSH SERPL-ACNC: 4.36 UIU/ML
TTG IGA SER IA-ACNC: <1.2 U/ML
TTG IGA SER-ACNC: NEGATIVE
UIBC SERPL-MCNC: 168 UG/DL

## 2021-04-09 LAB — IGA SER QL IEP: 61 MG/DL

## 2021-04-12 ENCOUNTER — APPOINTMENT (OUTPATIENT)
Dept: ULTRASOUND IMAGING | Facility: CLINIC | Age: 71
End: 2021-04-12
Payer: MEDICARE

## 2021-04-12 ENCOUNTER — OUTPATIENT (OUTPATIENT)
Dept: OUTPATIENT SERVICES | Facility: HOSPITAL | Age: 71
LOS: 1 days | End: 2021-04-12
Payer: MEDICARE

## 2021-04-12 DIAGNOSIS — Z00.8 ENCOUNTER FOR OTHER GENERAL EXAMINATION: ICD-10-CM

## 2021-04-12 DIAGNOSIS — Z90.49 ACQUIRED ABSENCE OF OTHER SPECIFIED PARTS OF DIGESTIVE TRACT: Chronic | ICD-10-CM

## 2021-04-12 PROCEDURE — 76705 ECHO EXAM OF ABDOMEN: CPT

## 2021-04-12 PROCEDURE — 76705 ECHO EXAM OF ABDOMEN: CPT | Mod: 26

## 2021-04-14 ENCOUNTER — NON-APPOINTMENT (OUTPATIENT)
Age: 71
End: 2021-04-14

## 2021-04-18 LAB — SARS-COV-2 N GENE NPH QL NAA+PROBE: NOT DETECTED

## 2021-04-22 ENCOUNTER — APPOINTMENT (OUTPATIENT)
Dept: PULMONOLOGY | Facility: CLINIC | Age: 71
End: 2021-04-22
Payer: MEDICARE

## 2021-04-22 VITALS
HEART RATE: 75 BPM | DIASTOLIC BLOOD PRESSURE: 73 MMHG | BODY MASS INDEX: 22.5 KG/M2 | HEIGHT: 66 IN | TEMPERATURE: 207.68 F | WEIGHT: 140 LBS | RESPIRATION RATE: 16 BRPM | OXYGEN SATURATION: 98 % | SYSTOLIC BLOOD PRESSURE: 136 MMHG

## 2021-04-22 LAB — POCT - HEMOGLOBIN (HGB), QUANTITATIVE, TRANSCUTANEOUS: 11.2

## 2021-04-22 PROCEDURE — 94727 GAS DIL/WSHOT DETER LNG VOL: CPT

## 2021-04-22 PROCEDURE — 88738 HGB QUANT TRANSCUTANEOUS: CPT

## 2021-04-22 PROCEDURE — 94010 BREATHING CAPACITY TEST: CPT

## 2021-04-22 PROCEDURE — ZZZZZ: CPT

## 2021-04-22 PROCEDURE — 95012 NITRIC OXIDE EXP GAS DETER: CPT

## 2021-04-22 PROCEDURE — 94729 DIFFUSING CAPACITY: CPT

## 2021-04-22 PROCEDURE — 99214 OFFICE O/P EST MOD 30 MIN: CPT | Mod: 25

## 2021-04-22 NOTE — PROCEDURE
[FreeTextEntry1] : PFT 4/22/21\par Flow  rTes nl\par Lung Volumes Nl\par DLCO 96 % nl range\par  HGB 11.2\par NIOX  11 ppb 4/22/21\par \par PFT with body box August 12, 2020\par Normal flow rates\par Mild obstructive pattern with an FEV1 FVC 74\par No bronchodilator response at FEV1\par 20% response at small airways\par Normal lung volumes are\par Noted increased % predicted.\par Specific inductance and resistance normal\par Diffusion normal 95% predicted.\par Hemoglobin 13.79\par \par Pulmonary 6-minute walk step stress test August 12, 2020\par Total steps 1/10/2017\par Baseline room air O2 saturation 96%\par Desaturation at minute 6  89% with immediate recovery to 96%\par Impression minimal O2 desaturation\par No indication for portable oxygen therapy\par \par Chest x-ray PA lateral August 12, 2020\par Normal cardiac size\par Scarring left lower lung zone cannot exclude component of bronchiectasis\par Right lung is otherwise clear\par No evidence for jodi adenopathy\par No dominant pulmonary nodules or pneumothorax\par Impression bronchiectatic change left lower lung zone\par \par Blood Draw\par Persistent mild reduction IgA normal range 70\par Data review\par Alpha-1 antitrypsin titer negative\par 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

## 2021-04-22 NOTE — HISTORY OF PRESENT ILLNESS
[Former] : former [< 30 pack-years] : < 30 pack-years [TextBox_4] : post tx Doxycline for  chest  congestion with significant improvement\par \par post Fall months  ago and noted rib fx Right\par  Otherwise states doing very well with Wixela\par  No inc use KARLOS\par \par 70-year-old female \par Complicated pulmonary history to be reviewed\par There is a diagnosis of asthma and she reports bronchiectasis with multiple histories of pneumonia at least 4-5 reported last very ill November December January with an additional illness in March\par She reports abnormal chest CTs and chest x-rays\par Last chest CT she reports was approximately 2018 where she was told of having scarlike tissue\par With these episodes she describes cough chest tightness wheezing sputum production\par Medications through the winter included Y UL P DARION in the nebulizer, Brovana right axilla sodium chloride nebulizer nasal Astelin nasal Flonase albuterol HFA course of prednisone and antibiotics including doxycycline x2 courses and Zithromax.\par \par She states 1 to 1-1/2 weeks ago she developed some trouble breathing self started treatment with albuterol\par She did not receive any antibiotics or steroids with this symptomatology most recently noted\par She does not report at present any sputum production for the past several days and it is clear without hemoptysis purulence or foul smelling\par She also states that she was sick in March there was some question whether she could have COVID-19 but a COVID  19 PCR and antibody testing subsequently was negative\par Other significant history includes a bronchoscopy dating back to April 2019 which was negative including AFB fungus mold and  negative TBLBx.\par Last chest x-ray available for review dates back to April 16, 2019 which was status post a transbronchial biopsy.\par Thickening of the minor fissure which was noted and reported chronic\par Reticulonodular opacities right midlung\par Ill-defined focal opacity periphery left upper to mid lung with some central extension reported to the left hilum with out pneumothorax pleural effusion or significant volume loss.\par \par Additional history\par Right breast DCIS treated radiation therapy 2017\par Childhood polio\par Mild mitral regurgitation\par Hypothyroidism\par Nocturnal myoclonus\par \par Vaccination profile flu vaccinations up-to-date\par 2018 2019 up-to-date with Prevnar and PCV 23\par \par  [TextBox_11] : 1 [YearQuit] : 1981

## 2021-04-29 ENCOUNTER — APPOINTMENT (OUTPATIENT)
Dept: PULMONOLOGY | Facility: CLINIC | Age: 71
End: 2021-04-29
Payer: MEDICARE

## 2021-04-29 PROCEDURE — 95800 SLP STDY UNATTENDED: CPT

## 2021-04-30 PROCEDURE — 95800 SLP STDY UNATTENDED: CPT

## 2021-05-03 ENCOUNTER — APPOINTMENT (OUTPATIENT)
Dept: MRI IMAGING | Facility: IMAGING CENTER | Age: 71
End: 2021-05-03
Payer: MEDICARE

## 2021-05-03 ENCOUNTER — OUTPATIENT (OUTPATIENT)
Dept: OUTPATIENT SERVICES | Facility: HOSPITAL | Age: 71
LOS: 1 days | End: 2021-05-03
Payer: MEDICARE

## 2021-05-03 ENCOUNTER — RESULT REVIEW (OUTPATIENT)
Age: 71
End: 2021-05-03

## 2021-05-03 ENCOUNTER — APPOINTMENT (OUTPATIENT)
Dept: RADIOLOGY | Facility: IMAGING CENTER | Age: 71
End: 2021-05-03
Payer: MEDICARE

## 2021-05-03 DIAGNOSIS — Z01.419 ENCOUNTER FOR GYNECOLOGICAL EXAMINATION (GENERAL) (ROUTINE) WITHOUT ABNORMAL FINDINGS: ICD-10-CM

## 2021-05-03 DIAGNOSIS — Z90.49 ACQUIRED ABSENCE OF OTHER SPECIFIED PARTS OF DIGESTIVE TRACT: Chronic | ICD-10-CM

## 2021-05-03 DIAGNOSIS — Z00.8 ENCOUNTER FOR OTHER GENERAL EXAMINATION: ICD-10-CM

## 2021-05-03 PROCEDURE — C8937: CPT

## 2021-05-03 PROCEDURE — C8908: CPT

## 2021-05-03 PROCEDURE — 77080 DXA BONE DENSITY AXIAL: CPT

## 2021-05-03 PROCEDURE — 77080 DXA BONE DENSITY AXIAL: CPT | Mod: 26

## 2021-05-03 PROCEDURE — 77049 MRI BREAST C-+ W/CAD BI: CPT | Mod: 26,MH

## 2021-05-03 PROCEDURE — A9585: CPT

## 2021-05-12 ENCOUNTER — APPOINTMENT (OUTPATIENT)
Dept: UROLOGY | Facility: CLINIC | Age: 71
End: 2021-05-12
Payer: MEDICARE

## 2021-05-12 ENCOUNTER — OUTPATIENT (OUTPATIENT)
Dept: OUTPATIENT SERVICES | Facility: HOSPITAL | Age: 71
LOS: 1 days | End: 2021-05-12
Payer: MEDICARE

## 2021-05-12 VITALS — SYSTOLIC BLOOD PRESSURE: 153 MMHG | DIASTOLIC BLOOD PRESSURE: 85 MMHG | HEART RATE: 74 BPM | RESPIRATION RATE: 16 BRPM

## 2021-05-12 DIAGNOSIS — Z90.49 ACQUIRED ABSENCE OF OTHER SPECIFIED PARTS OF DIGESTIVE TRACT: Chronic | ICD-10-CM

## 2021-05-12 PROCEDURE — 76775 US EXAM ABDO BACK WALL LIM: CPT

## 2021-05-12 PROCEDURE — 76775 US EXAM ABDO BACK WALL LIM: CPT | Mod: 26

## 2021-05-12 PROCEDURE — 99213 OFFICE O/P EST LOW 20 MIN: CPT

## 2021-05-12 NOTE — ASSESSMENT
[FreeTextEntry1] : Ms. Westbrook is a 71 year old female presenting today for pain in her lower back that began about one month ago. \par She reports to have a history of herniated discs. \par She has a history of angiomyolipoma in her left kidney.\par \par The renal US from 5/12/21 demonstrated: \par Right kidney: 10.6 x 4.8 x 4.6 cm \par Cortical Thickness: UP 1.2 cm LP 1.3 cm \par \par Left kidney: 10.2 x 4.4 x 5.1 cm		 \par Cortical Thickness: Up 1.4 cm LP 1.5 cm \par  \par Echogenicity: Normal\par \par Bladder: Not visualized \par \par Findings: Again there is a 1.3 x 0.9 x 1.1 cm AML in the left upper/mid pole, previous ultrasound 1.4 x 1.2 x 1.1 cm. There are 2 small nonvascular cysts in the left kidney 0.6 cm in the upper pole and 0.5 cm in the lower pole. Both kidneys are normal in size and echogenicity without hydronephrosis or stones visualized. \par \par I advised her that her back pain is likely musculoskeletal and that she could try physical therapy to avoid straining her back. \par She should have another renal US done in one year to monitor the AML.\par \par She return to the office in one year\par \par I spent 25 minutes with the patient.

## 2021-05-12 NOTE — REVIEW OF SYSTEMS
[Negative] : Heme/Lymph [Feeling Tired] : feeling tired [Dry Eyes] : dryness of the eyes [Shortness Of Breath] : shortness of breath [Cough] : cough [Diarrhea] : diarrhea [It is possible that I am pregnant] : it is possible that I am pregnant [Told you have blood in urine on a urine test] : told blood was present in a urine test [Wake up at night to urinate  How many times?  ___] : wakes up to urinate [unfilled] times during the night [Strong urge to urinate] : strong urge to urinate [Slow urine stream] : slow urine stream [Joint Pain] : joint pain

## 2021-05-12 NOTE — PHYSICAL EXAM
[General Appearance - Well Developed] : well developed [General Appearance - Well Nourished] : well nourished [Normal Appearance] : normal appearance [Well Groomed] : well groomed [General Appearance - In No Acute Distress] : no acute distress [Edema] : no peripheral edema [Respiration, Rhythm And Depth] : normal respiratory rhythm and effort [Exaggerated Use Of Accessory Muscles For Inspiration] : no accessory muscle use [Abdomen Soft] : soft [Abdomen Tenderness] : non-tender [Normal Station and Gait] : the gait and station were normal for the patient's age [] : no rash [No Focal Deficits] : no focal deficits [Oriented To Time, Place, And Person] : oriented to person, place, and time [Affect] : the affect was normal [Mood] : the mood was normal [Not Anxious] : not anxious [No Palpable Adenopathy] : no palpable adenopathy [FreeTextEntry1] : pain in lower back

## 2021-05-12 NOTE — HISTORY OF PRESENT ILLNESS
[FreeTextEntry1] : Ms. Westbrook is a 71 year old female presenting today for pain in her lower back that began about one month ago. \par She reports to have a history of herniated discs. \par She has a history of angiomyolipoma in her left kidney.\par \par The renal US from 5/12/21 demonstrated: \par Right kidney: 10.6 x 4.8 x 4.6 cm \par Cortical Thickness: UP 1.2 cm LP 1.3 cm \par \par Left kidney: 10.2 x 4.4 x 5.1 cm		 \par Cortical Thickness: Up 1.4 cm LP 1.5 cm \par  \par Echogenicity: Normal\par \par Bladder: Not visualized \par \par Findings: Again there is a 1.3 x 0.9 x 1.1 cm AML in the left upper/mid pole, previous ultrasound 1.4 x 1.2 x 1.1 cm. There are 2 small nonvascular cysts in the left kidney 0.6 cm in the upper pole and 0.5 cm in the lower pole. Both kidneys are normal in size and echogenicity without hydronephrosis or stones visualized. \par \par I advised her that her back pain is likely musculoskeletal and that she could try physical therapy to avoid straining her back. \par She should have another renal US done in one year to monitor the AML.\par \par She return to the office in one year

## 2021-05-17 ENCOUNTER — APPOINTMENT (OUTPATIENT)
Dept: PULMONOLOGY | Facility: CLINIC | Age: 71
End: 2021-05-17
Payer: MEDICARE

## 2021-05-17 ENCOUNTER — NON-APPOINTMENT (OUTPATIENT)
Age: 71
End: 2021-05-17

## 2021-05-17 VITALS
OXYGEN SATURATION: 95 % | TEMPERATURE: 208.04 F | DIASTOLIC BLOOD PRESSURE: 66 MMHG | HEART RATE: 77 BPM | SYSTOLIC BLOOD PRESSURE: 117 MMHG

## 2021-05-17 DIAGNOSIS — G47.19 OTHER HYPERSOMNIA: ICD-10-CM

## 2021-05-17 PROCEDURE — 99214 OFFICE O/P EST MOD 30 MIN: CPT

## 2021-05-17 RX ORDER — DOXYCYCLINE HYCLATE 100 MG/1
100 CAPSULE ORAL
Qty: 14 | Refills: 0 | Status: DISCONTINUED | COMMUNITY
Start: 2021-04-14 | End: 2021-05-17

## 2021-05-17 NOTE — PROCEDURE
[FreeTextEntry1] : Sleep study\par April 29–April 30, 2021\par Overall mild obstructive sleep apnea\par AHI 7\par Time spent less than 90% on room air 4.3%\par Mean O2 saturation 94.5%\par Recommendation address treatment protocol with auto CPAP\par \par PFT 4/22/21\par Flow  rates nl\par Lung Volumes Nl\par DLCO 96 % nl range\par  HGB 11.2\par NIOX  11 ppb 4/22/21\par \par PFT with body box August 12, 2020\par Normal flow rates\par Mild obstructive pattern with an FEV1 FVC 74\par No bronchodilator response at FEV1\par 20% response at small airways\par Normal lung volumes are\par Noted increased % predicted.\par Specific inductance and resistance normal\par Diffusion normal 95% predicted.\par Hemoglobin 13.79\par \par Pulmonary 6-minute walk step stress test August 12, 2020\par Total steps 1/10/2017\par Baseline room air O2 saturation 96%\par Desaturation at minute 6  89% with immediate recovery to 96%\par Impression minimal O2 desaturation\par No indication for portable oxygen therapy\par \par Chest x-ray PA lateral August 12, 2020\par Normal cardiac size\par Scarring left lower lung zone cannot exclude component of bronchiectasis\par Right lung is otherwise clear\par No evidence for jodi adenopathy\par No dominant pulmonary nodules or pneumothorax\par Impression bronchiectatic change left lower lung zone\par \par Blood Draw\par Persistent mild reduction IgA normal range 70\par Data review\par Alpha-1 antitrypsin titer negative\par 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

## 2021-05-17 NOTE — DISCUSSION/SUMMARY
[FreeTextEntry1] : Mild MAEGAN  AHI 7 with excessive daytime sleepiness\par Asthma\par Abnormal CXR CT CHEST\par Bronchiectasis\par IgA deficiency\par Hx as  noted\par hypothyroid\par Childhood polio\par Mild MR\par 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\par noted just below normal IGA and low IgG 2 but demonstrates AB to strep  and tetanus\par \par Recommendations\par PFT July\par 6 min walk- JUly\par NIOX\par TX\par restart Wixela 250-50 mcg 1 puff BID and Rinse- onlu using QD sec leg  cramps plus voice issue\par CHANGE LAMA Sample Spiriva 2.5 mcg 2 puffs QD\par  prn Albuterol\par  NACL in NEB only BID\par no indication Antibx\par Issue immuno serology IGA and IgG  with subtypes A1AT titer HP panel and possible autoimmune serology- reviewed\par To consider VEST therapy if Bronchiectasis confirmed  with CT CHEST but requests to hold\par Pneumonia vaccines up to  date post 67\par Await [employer]ntal  Appliance- then  restudy and make decision re CPAP

## 2021-07-26 DIAGNOSIS — D30.01 BENIGN NEOPLASM OF RIGHT KIDNEY: ICD-10-CM

## 2021-07-28 ENCOUNTER — APPOINTMENT (OUTPATIENT)
Dept: PULMONOLOGY | Facility: CLINIC | Age: 71
End: 2021-07-28
Payer: MEDICARE

## 2021-07-28 VITALS
WEIGHT: 134 LBS | TEMPERATURE: 206.96 F | DIASTOLIC BLOOD PRESSURE: 64 MMHG | RESPIRATION RATE: 14 BRPM | HEART RATE: 70 BPM | SYSTOLIC BLOOD PRESSURE: 102 MMHG | OXYGEN SATURATION: 94 % | BODY MASS INDEX: 21.63 KG/M2

## 2021-07-28 DIAGNOSIS — Z87.898 PERSONAL HISTORY OF OTHER SPECIFIED CONDITIONS: ICD-10-CM

## 2021-07-28 PROCEDURE — 94727 GAS DIL/WSHOT DETER LNG VOL: CPT

## 2021-07-28 PROCEDURE — 94729 DIFFUSING CAPACITY: CPT

## 2021-07-28 PROCEDURE — ZZZZZ: CPT

## 2021-07-28 PROCEDURE — 99214 OFFICE O/P EST MOD 30 MIN: CPT | Mod: 25

## 2021-07-28 PROCEDURE — 94010 BREATHING CAPACITY TEST: CPT

## 2021-07-28 PROCEDURE — 95012 NITRIC OXIDE EXP GAS DETER: CPT

## 2021-07-28 NOTE — DISCUSSION/SUMMARY
[FreeTextEntry1] : Mild MAEGAN  AHI 7 with excessive daytime sleepiness\par Asthma\par Abnormal CXR CT CHEST\par Bronchiectasis\par IgA deficiency\par Hx as  noted\par hypothyroid\par Childhood polio\par Mild MR\par 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\par noted just below normal IGA and low IgG 2 but demonstrates AB to strep  and tetanus\par \par Recommendations\par PFT July\par 6 min walk- JUly-RS\par NIOX\par TX\par restart Wixela 250-50 mcg 1 puff BID and Rinse- onlu using QD sec leg  cramps plus voice issue\par CHANGE LAMA Sample Spiriva 2.5 mcg 2 puffs QD\par  prn Albuterol\par  NACL in NEB only BID\par no indication Antibx\par Issue immuno serology IGA and IgG  with subtypes A1AT titer HP panel and possible autoimmune serology- reviewed\par To consider VEST therapy if Bronchiectasis confirmed  with CT CHEST but requests to hold\par Pneumonia vaccines up to  date post 67\par Oral  [employer]- then  restudy and make decision re CPAP- not active use

## 2021-07-28 NOTE — PROCEDURE
[FreeTextEntry1] : Sleep study\par April 29–April 30, 2021\par Overall mild obstructive sleep apnea\par AHI 7\par Time spent less than 90% on room air 4.3%\par Mean O2 saturation 94.5%\par Recommendation address treatment protocol with auto CPAP\par PFT 7/28/21\par Flow Rtaes nl\par Lung Volumes nl\par TTLC 95 %\par DLCO 91 % nl\par HGB 15.0\par PFT 4/22/21\par Flow  rates nl\par Lung Volumes Nl\par DLCO 96 % nl range\par  HGB 11.2\par NIOX  11 ppb 4/22/21\par \par PFT with body box August 12, 2020\par Normal flow rates\par Mild obstructive pattern with an FEV1 FVC 74\par No bronchodilator response at FEV1\par 20% response at small airways\par Normal lung volumes are\par Noted increased % predicted.\par Specific inductance and resistance normal\par Diffusion normal 95% predicted.\par Hemoglobin 13.79\par \par Pulmonary 6-minute walk step stress test August 12, 2020\par Total steps 1/10/2017\par Baseline room air O2 saturation 96%\par Desaturation at minute 6  89% with immediate recovery to 96%\par Impression minimal O2 desaturation\par No indication for portable oxygen therapy\par \par Chest x-ray PA lateral August 12, 2020\par Normal cardiac size\par Scarring left lower lung zone cannot exclude component of bronchiectasis\par Right lung is otherwise clear\par No evidence for jodi adenopathy\par No dominant pulmonary nodules or pneumothorax\par Impression bronchiectatic change left lower lung zone\par \par Blood Draw\par Persistent mild reduction IgA normal range 70\par Data review\par Alpha-1 antitrypsin titer negative\par 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

## 2021-07-28 NOTE — REVIEW OF SYSTEMS
[Postnasal Drip] : postnasal drip [Cough] : cough [Chest Tightness] : chest tightness [Frequent URIs] : frequent URIs [Sputum] : sputum [Wheezing] : wheezing [Nasal Discharge] : nasal discharge [Negative] : Psychiatric [Fever] : no fever [Fatigue] : no fatigue [Recent Wt Gain (___ Lbs)] : ~T no recent weight gain [Chills] : no chills [Poor Appetite] : no poor appetite [Hemoptysis] : no hemoptysis [Dyspnea] : no dyspnea [Pleuritic Pain] : no pleuritic pain [A.M. Dry Mouth] : no a.m. dry mouth [SOB on Exertion] : no sob on exertion [Headache] : no headache [Focal Weakness] : no focal weakness [Seizures] : no seizures [Dizziness] : no dizziness [Numbness] : no numbness [Memory Loss] : no memory loss [Paralysis] : no paralysis [Diabetes] : no diabetes [Confusion] : no confusion [TextBox_44] : MR [TextBox_83] : History renal angiomyolipoma [TextBox_122] : nocturnal myoclonus, reports polio as a child without residual  [TextBox_144] : Thyroid nodule, Hypothyroidism

## 2021-07-28 NOTE — HISTORY OF PRESENT ILLNESS
[Former] : former [< 30 pack-years] : < 30 pack-years [TextBox_11] : 1 [TextBox_4] : post tx Doxycline for  chest  congestion with significant improvement\par \par post Fall months  ago and noted rib fx Right\par  Otherwise states doing very well with Wixela\par  No inc use KARLOS\par \par 70-year-old female \par Complicated pulmonary history to be reviewed\par There is a diagnosis of asthma and she reports bronchiectasis with multiple histories of pneumonia at least 4-5 reported last very ill November December January with an additional illness in March\par She reports abnormal chest CTs and chest x-rays\par Last chest CT she reports was approximately 2018 where she was told of having scarlike tissue\par With these episodes she describes cough chest tightness wheezing sputum production\par Medications through the winter included Y UL P DARION in the nebulizer, Brovana right axilla sodium chloride nebulizer nasal Astelin nasal Flonase albuterol HFA course of prednisone and antibiotics including doxycycline x2 courses and Zithromax.\par \par She states 1 to 1-1/2 weeks ago she developed some trouble breathing self started treatment with albuterol\par She did not receive any antibiotics or steroids with this symptomatology most recently noted\par She does not report at present any sputum production for the past several days and it is clear without hemoptysis purulence or foul smelling\par She also states that she was sick in March there was some question whether she could have COVID-19 but a COVID  19 PCR and antibody testing subsequently was negative\par Other significant history includes a bronchoscopy dating back to April 2019 which was negative including AFB fungus mold and  negative TBLBx.\par Last chest x-ray available for review dates back to April 16, 2019 which was status post a transbronchial biopsy.\par Thickening of the minor fissure which was noted and reported chronic\par Reticulonodular opacities right midlung\par Ill-defined focal opacity periphery left upper to mid lung with some central extension reported to the left hilum with out pneumothorax pleural effusion or significant volume loss.\par \par Additional history\par Right breast DCIS treated radiation therapy 2017\par Childhood polio\par Mild mitral regurgitation\par Hypothyroidism\par Nocturnal myoclonus\par \par Vaccination profile flu vaccinations up-to-date\par 2018 2019 up-to-date with Prevnar and PCV 23\par \par  [YearQuit] : 1981

## 2021-09-01 ENCOUNTER — OUTPATIENT (OUTPATIENT)
Dept: OUTPATIENT SERVICES | Facility: HOSPITAL | Age: 71
LOS: 1 days | End: 2021-09-01
Payer: MEDICARE

## 2021-09-01 ENCOUNTER — APPOINTMENT (OUTPATIENT)
Dept: ULTRASOUND IMAGING | Facility: IMAGING CENTER | Age: 71
End: 2021-09-01
Payer: MEDICARE

## 2021-09-01 ENCOUNTER — RESULT REVIEW (OUTPATIENT)
Age: 71
End: 2021-09-01

## 2021-09-01 ENCOUNTER — APPOINTMENT (OUTPATIENT)
Dept: UROLOGY | Facility: CLINIC | Age: 71
End: 2021-09-01
Payer: MEDICARE

## 2021-09-01 DIAGNOSIS — Z90.49 ACQUIRED ABSENCE OF OTHER SPECIFIED PARTS OF DIGESTIVE TRACT: Chronic | ICD-10-CM

## 2021-09-01 DIAGNOSIS — M54.9 DORSALGIA, UNSPECIFIED: ICD-10-CM

## 2021-09-01 DIAGNOSIS — D17.71 BENIGN LIPOMATOUS NEOPLASM OF KIDNEY: ICD-10-CM

## 2021-09-01 DIAGNOSIS — R07.81 PLEURODYNIA: ICD-10-CM

## 2021-09-01 PROCEDURE — 76770 US EXAM ABDO BACK WALL COMP: CPT

## 2021-09-01 PROCEDURE — 99214 OFFICE O/P EST MOD 30 MIN: CPT

## 2021-09-01 PROCEDURE — 76770 US EXAM ABDO BACK WALL COMP: CPT | Mod: 26

## 2021-09-01 NOTE — PHYSICAL EXAM
[General Appearance - Well Developed] : well developed [Abdomen Soft] : soft [Skin Color & Pigmentation] : normal skin color and pigmentation [Edema] : no peripheral edema [] : no respiratory distress [Oriented To Time, Place, And Person] : oriented to person, place, and time [No Focal Deficits] : no focal deficits [Abdomen Tenderness] : non-tender [Costovertebral Angle Tenderness] : no ~M costovertebral angle tenderness [Urinary Bladder Findings] : the bladder was normal on palpation [FreeTextEntry1] : paraspinal tenderness, left lower rib/rib cartilage tenderness

## 2021-09-01 NOTE — HISTORY OF PRESENT ILLNESS
[Flank Pain] : flank pain [Left Flank] : left flank [None] : There is no radiation [Aching] : aching [Gradual] : gradual [___ Week(s) Ago] : [unfilled] week(s) ago [FreeTextEntry1] : 71 yr old female presents to follow with left flank pain. The pain started 3 weeks ago, pt rates the pain as 8/10. Pt using heat/ice to the area- relieves the pain to 4-5/10. Pt also using CBD oil topical. Pt has herniated disc that cause chronic pain, usually on the right. Pt does not use Tylenol/Ibuprofen due to upset stomach. Pt saw Dr. Sabillon in May for AML. \par \par Renal US- 1.3 x 1 cm AML. Left kidney- 2 small cysts. No stones/ hydro. \par \par

## 2021-09-01 NOTE — ASSESSMENT
[FreeTextEntry1] : back pain- paraspinal tenderness and rib--> appears more likely musculoskeletal.\par \par Patient does physical therapy three times a week- follow up \par \par Renal US in May- 1.3 x 1 cm AML. Left kidney- 2 small cysts. No stones/ hydro. \par \par Based on exam and history, seems less likely renal than musculoskeletal. Will arrange eval for kidney based on low suspicion with repeat renal US, and will use CT scan only if suggestions on US that it will be better defining.\par \par plan \par 1) Renal US in [facility]- will review by phone\par 2)  urine culture and UA

## 2021-09-02 LAB
APPEARANCE: CLEAR
BACTERIA: NEGATIVE
BILIRUBIN URINE: NEGATIVE
BLOOD URINE: NORMAL
COLOR: NORMAL
GLUCOSE QUALITATIVE U: NEGATIVE
HYALINE CASTS: 1 /LPF
KETONES URINE: NEGATIVE
LEUKOCYTE ESTERASE URINE: NEGATIVE
MICROSCOPIC-UA: NORMAL
NITRITE URINE: NEGATIVE
PH URINE: 7
PROTEIN URINE: NEGATIVE
RED BLOOD CELLS URINE: 7 /HPF
SPECIFIC GRAVITY URINE: 1.02
SQUAMOUS EPITHELIAL CELLS: 1 /HPF
UROBILINOGEN URINE: NORMAL
WHITE BLOOD CELLS URINE: 0 /HPF

## 2021-09-03 LAB — BACTERIA UR CULT: NORMAL

## 2021-10-06 ENCOUNTER — APPOINTMENT (OUTPATIENT)
Dept: PULMONOLOGY | Facility: CLINIC | Age: 71
End: 2021-10-06
Payer: MEDICARE

## 2021-10-06 ENCOUNTER — RESULT CHARGE (OUTPATIENT)
Age: 71
End: 2021-10-06

## 2021-10-06 VITALS
TEMPERATURE: 209.12 F | OXYGEN SATURATION: 99 % | SYSTOLIC BLOOD PRESSURE: 99 MMHG | BODY MASS INDEX: 21.63 KG/M2 | WEIGHT: 134 LBS | HEART RATE: 81 BPM | DIASTOLIC BLOOD PRESSURE: 68 MMHG | RESPIRATION RATE: 14 BRPM

## 2021-10-06 DIAGNOSIS — Z01.812 ENCOUNTER FOR PREPROCEDURAL LABORATORY EXAMINATION: ICD-10-CM

## 2021-10-06 LAB — SARS-COV-2 AG RESP QL IA.RAPID: NEGATIVE

## 2021-10-06 PROCEDURE — 87811 SARS-COV-2 COVID19 W/OPTIC: CPT

## 2021-10-06 PROCEDURE — 87811 SARS-COV-2 COVID19 W/OPTIC: CPT | Mod: QW

## 2021-10-06 PROCEDURE — 99214 OFFICE O/P EST MOD 30 MIN: CPT | Mod: 25

## 2021-10-06 PROCEDURE — 94729 DIFFUSING CAPACITY: CPT

## 2021-10-06 PROCEDURE — 88738 HGB QUANT TRANSCUTANEOUS: CPT

## 2021-10-06 PROCEDURE — 94010 BREATHING CAPACITY TEST: CPT

## 2021-10-06 PROCEDURE — 94727 GAS DIL/WSHOT DETER LNG VOL: CPT

## 2021-10-06 PROCEDURE — ZZZZZ: CPT

## 2021-10-06 NOTE — DISCUSSION/SUMMARY
[FreeTextEntry1] : Mild MAEGAN  AHI 7 with excessive daytime sleepiness\par Asthma\par Abnormal CXR CT CHEST\par Bronchiectasis- Mild  Flare - Tx DOXY Plus medrol\par IgA deficiency\par Hx as  noted\par hypothyroid\par Childhood polio\par Mild MR\par 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\par noted just below normal IGA and low IgG 2 but demonstrates AB to strep  and tetanus\par \par Recommendations\par PFT July\par 6 min walk- JUly-RS\par NIOX\par TX\par restart Wixela 250-50 mcg 1 puff BID and Rinse- onlu using QD sec leg  cramps plus voice issue\par CHANGE LAMA Sample Spiriva 2.5 mcg 2 puffs QD\par  prn Albuterol\par  NACL in NEB only BID\par no indication Antibx\par Issue immuno serology IGA and IgG  with subtypes A1AT titer HP panel and possible autoimmune serology- reviewed\par To consider VEST therapy if Bronchiectasis confirmed  with CT CHEST but requests to hold\par Pneumonia vaccines up to  date post 67\par Oral  Appliance- then  restudy and make decision re CPAP- not active use f/u Dr Mckeon

## 2021-10-06 NOTE — HISTORY OF PRESENT ILLNESS
[Former] : former [< 30 pack-years] : < 30 pack-years [TextBox_11] : 1 [TextBox_4] :   chest  congestion \par no purlent sputum\par \par MAEGAN issue MC coverage with oral appliance with f/u Sleep  dentist Dr Mckeon \par \par post Fall months  ago and noted rib fx Right\par  Otherwise states doing very well with Wixela\par  No inc use KARLOS\par \par 70-year-old female \par Complicated pulmonary history to be reviewed\par There is a diagnosis of asthma and she reports bronchiectasis with multiple histories of pneumonia at least 4-5 reported last very ill November December January with an additional illness in March\par She reports abnormal chest CTs and chest x-rays\par Last chest CT she reports was approximately 2018 where she was told of having scarlike tissue\par With these episodes she describes cough chest tightness wheezing sputum production\par Medications through the winter included Y UL P DARION in the nebulizer, Brovana right axilla sodium chloride nebulizer nasal Astelin nasal Flonase albuterol HFA course of prednisone and antibiotics including doxycycline x2 courses and Zithromax.\par \par She states 1 to 1-1/2 weeks ago she developed some trouble breathing self started treatment with albuterol\par She did not receive any antibiotics or steroids with this symptomatology most recently noted\par She does not report at present any sputum production for the past several days and it is clear without hemoptysis purulence or foul smelling\par She also states that she was sick in March there was some question whether she could have COVID-19 but a COVID  19 PCR and antibody testing subsequently was negative\par Other significant history includes a bronchoscopy dating back to April 2019 which was negative including AFB fungus mold and  negative TBLBx.\par Last chest x-ray available for review dates back to April 16, 2019 which was status post a transbronchial biopsy.\par Thickening of the minor fissure which was noted and reported chronic\par Reticulonodular opacities right midlung\par Ill-defined focal opacity periphery left upper to mid lung with some central extension reported to the left hilum with out pneumothorax pleural effusion or significant volume loss.\par \par Additional history\par Right breast DCIS treated radiation therapy 2017\par Childhood polio\par Mild mitral regurgitation\par Hypothyroidism\par Nocturnal myoclonus\par \par Vaccination profile flu vaccinations up-to-date\par 2018 2019 up-to-date with Prevnar and PCV 23\par \par  [YearQuit] : 1981

## 2021-10-06 NOTE — PROCEDURE
[FreeTextEntry1] : PFT 10/6/21\par Flow rates nl\par TLC 89 %\par  DLCO  87 %\par HGB 15.0\par \par Sleep study\par April 29–April 30, 2021\par Overall mild obstructive sleep apnea\par AHI 7\par Time spent less than 90% on room air 4.3%\par Mean O2 saturation 94.5%\par Recommendation address treatment protocol with auto CPAP\par PFT 7/28/21\par Flow Rtaes nl\par Lung Volumes nl\par TTLC 95 %\par DLCO 91 % nl\par HGB 15.0\par PFT 4/22/21\par Flow  rates nl\par Lung Volumes Nl\par DLCO 96 % nl range\par  HGB 11.2\par NIOX  11 ppb 4/22/21\par \par PFT with body box August 12, 2020\par Normal flow rates\par Mild obstructive pattern with an FEV1 FVC 74\par No bronchodilator response at FEV1\par 20% response at small airways\par Normal lung volumes are\par Noted increased % predicted.\par Specific inductance and resistance normal\par Diffusion normal 95% predicted.\par Hemoglobin 13.79\par \par Pulmonary 6-minute walk step stress test August 12, 2020\par Total steps 1/10/2017\par Baseline room air O2 saturation 96%\par Desaturation at minute 6  89% with immediate recovery to 96%\par Impression minimal O2 desaturation\par No indication for portable oxygen therapy\par \par Chest x-ray PA lateral August 12, 2020\par Normal cardiac size\par Scarring left lower lung zone cannot exclude component of bronchiectasis\par Right lung is otherwise clear\par No evidence for jodi adenopathy\par No dominant pulmonary nodules or pneumothorax\par Impression bronchiectatic change left lower lung zone\par \par Blood Draw\par Persistent mild reduction IgA normal range 70\par Data review\par Alpha-1 antitrypsin titer negative\par 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

## 2021-10-25 ENCOUNTER — APPOINTMENT (OUTPATIENT)
Dept: PULMONOLOGY | Facility: CLINIC | Age: 71
End: 2021-10-25
Payer: MEDICARE

## 2021-10-25 VITALS
DIASTOLIC BLOOD PRESSURE: 71 MMHG | SYSTOLIC BLOOD PRESSURE: 126 MMHG | OXYGEN SATURATION: 97 % | TEMPERATURE: 208.76 F | HEART RATE: 73 BPM

## 2021-10-25 PROCEDURE — 99214 OFFICE O/P EST MOD 30 MIN: CPT | Mod: 25

## 2021-10-25 PROCEDURE — 71046 X-RAY EXAM CHEST 2 VIEWS: CPT

## 2021-10-25 NOTE — PROCEDURE
[FreeTextEntry1] : Chest x-ray PA lateral October 25, 2021\par Cardiac size is normal\par Lower lumbar sacral scoliosis\par Bronchiectatic changes right lower lung zone\par No parenchymal consolidation pleural effusions pneumothorax\par Mediastinum hilum unremarkable\par Pression no evidence for pneumonia\par \par \par PFT 10/6/21\par Flow rates nl\par TLC 89 %\par  DLCO  87 %\par HGB 15.0\par \par Sleep study\par April 29–April 30, 2021\par Overall mild obstructive sleep apnea\par AHI 7\par Time spent less than 90% on room air 4.3%\par Mean O2 saturation 94.5%\par Recommendation address treatment protocol with auto CPAP\par PFT 7/28/21\par Flow Rtaes nl\par Lung Volumes nl\par TTLC 95 %\par DLCO 91 % nl\par HGB 15.0\par PFT 4/22/21\par Flow  rates nl\par Lung Volumes Nl\par DLCO 96 % nl range\par  HGB 11.2\par NIOX  11 ppb 4/22/21\par \par PFT with body box August 12, 2020\par Normal flow rates\par Mild obstructive pattern with an FEV1 FVC 74\par No bronchodilator response at FEV1\par 20% response at small airways\par Normal lung volumes are\par Noted increased % predicted.\par Specific inductance and resistance normal\par Diffusion normal 95% predicted.\par Hemoglobin 13.79\par \par Pulmonary 6-minute walk step stress test August 12, 2020\par Total steps 1/10/2017\par Baseline room air O2 saturation 96%\par Desaturation at minute 6  89% with immediate recovery to 96%\par Impression minimal O2 desaturation\par No indication for portable oxygen therapy\par \par Chest x-ray PA lateral August 12, 2020\par Normal cardiac size\par Scarring left lower lung zone cannot exclude component of bronchiectasis\par Right lung is otherwise clear\par No evidence for jodi adenopathy\par No dominant pulmonary nodules or pneumothorax\par Impression bronchiectatic change left lower lung zone\par \par Blood Draw\par Persistent mild reduction IgA normal range 70\par Data review\par Alpha-1 antitrypsin titer negative\par 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

## 2021-10-25 NOTE — REVIEW OF SYSTEMS
Private Vehicle [Postnasal Drip] : postnasal drip [Cough] : cough [Chest Tightness] : chest tightness [Frequent URIs] : frequent URIs [Sputum] : sputum [Wheezing] : wheezing [Nasal Discharge] : nasal discharge [Negative] : Psychiatric [Fever] : no fever [Fatigue] : no fatigue [Recent Wt Gain (___ Lbs)] : ~T no recent weight gain [Chills] : no chills [Poor Appetite] : no poor appetite [Hemoptysis] : no hemoptysis [Dyspnea] : no dyspnea [Pleuritic Pain] : no pleuritic pain [A.M. Dry Mouth] : no a.m. dry mouth [SOB on Exertion] : no sob on exertion [Headache] : no headache [Focal Weakness] : no focal weakness [Seizures] : no seizures [Dizziness] : no dizziness [Numbness] : no numbness [Memory Loss] : no memory loss [Paralysis] : no paralysis [Confusion] : no confusion [Diabetes] : no diabetes [TextBox_44] : MR [TextBox_83] : History renal angiomyolipoma [TextBox_122] : nocturnal myoclonus, reports polio as a child without residual  [TextBox_144] : Thyroid nodule, Hypothyroidism

## 2021-10-25 NOTE — DISCUSSION/SUMMARY
[FreeTextEntry1] : Mild MAEGAN  AHI 7 with excessive daytime sleepiness\par Asthma\par Abnormal CXR CT CHEST\par Bronchiectasis- Mild  Flare - Tx DOXY Plus medrol still with cough\par IgA deficiency\par Hx as  noted\par hypothyroid\par Childhood polio\par Mild MR\par 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\par noted just below normal IGA and low IgG 2 but demonstrates AB to strep  and tetanus\par \par Recommendations\par 6 min walk- JUly-RS\par NIOX\par TX\par restart Wixela 250-50 mcg 1 puff BID and Rinse- only using QD sec leg  cramps plus voice issue\par CHANGE LAMA Sample Spiriva 2.5 mcg 2 puffs QD-hold both medications trial ofBREZTRI 2 puffs BID plus  singulair with pm dinner\par  prn Albuterol\par  NACL in NEB only BID\par no indication Antibx\par Issue immuno serology IGA and IgG  with subtypes A1AT titer HP panel and possible autoimmune serology- reviewed\par To consider VEST therapy if Bronchiectasis confirmed  with CT CHEST but requests to hold\par Pneumonia vaccines up to  date post 67\par Oral  Appliance- then  restudy and make decision re CPAP- not active use f/u Dr Mckeon

## 2021-10-25 NOTE — HISTORY OF PRESENT ILLNESS
[Former] : former [< 30 pack-years] : < 30 pack-years [TextBox_4] :   chest  congestion \par Nocturnal symptoms cough leading to wheeze\par No purulent sputum reports clear to white\par No hemoptysis\par No fevers chills or sweats\par Has completed 2 courses of steroids 2 courses of antibiotics with persistence of the cough\par \par MAEGAN issue MC coverage with oral appliance with f/u Sleep  dentist Dr Mckeon \par \par post Fall months  ago and noted rib fx Right\par  Otherwise states doing very well with Wixela\par  No inc use KARLOS\par \par 70-year-old female \par Complicated pulmonary history to be reviewed\par There is a diagnosis of asthma and she reports bronchiectasis with multiple histories of pneumonia at least 4-5 reported last very ill November December January with an additional illness in March\par She reports abnormal chest CTs and chest x-rays\par Last chest CT she reports was approximately 2018 where she was told of having scarlike tissue\par With these episodes she describes cough chest tightness wheezing sputum production\par Medications through the winter included Y UL P DARION in the nebulizer, Brovana right axilla sodium chloride nebulizer nasal Astelin nasal Flonase albuterol HFA course of prednisone and antibiotics including doxycycline x2 courses and Zithromax.\par \par She states 1 to 1-1/2 weeks ago she developed some trouble breathing self started treatment with albuterol\par She did not receive any antibiotics or steroids with this symptomatology most recently noted\par She does not report at present any sputum production for the past several days and it is clear without hemoptysis purulence or foul smelling\par She also states that she was sick in March there was some question whether she could have COVID-19 but a COVID  19 PCR and antibody testing subsequently was negative\par Other significant history includes a bronchoscopy dating back to April 2019 which was negative including AFB fungus mold and  negative TBLBx.\par Last chest x-ray available for review dates back to April 16, 2019 which was status post a transbronchial biopsy.\par Thickening of the minor fissure which was noted and reported chronic\par Reticulonodular opacities right midlung\par Ill-defined focal opacity periphery left upper to mid lung with some central extension reported to the left hilum with out pneumothorax pleural effusion or significant volume loss.\par \par Additional history\par Right breast DCIS treated radiation therapy 2017\par Childhood polio\par Mild mitral regurgitation\par Hypothyroidism\par Nocturnal myoclonus\par \par Vaccination profile flu vaccinations up-to-date\par 2018 2019 up-to-date with Prevnar and PCV 23\par \par  [TextBox_11] : 1 [YearQuit] : 1981

## 2021-10-28 ENCOUNTER — APPOINTMENT (OUTPATIENT)
Dept: PULMONOLOGY | Facility: CLINIC | Age: 71
End: 2021-10-28

## 2021-11-10 ENCOUNTER — APPOINTMENT (OUTPATIENT)
Dept: UROLOGY | Facility: CLINIC | Age: 71
End: 2021-11-10
Payer: MEDICARE

## 2021-11-10 ENCOUNTER — OUTPATIENT (OUTPATIENT)
Dept: OUTPATIENT SERVICES | Facility: HOSPITAL | Age: 71
LOS: 1 days | End: 2021-11-10
Payer: MEDICARE

## 2021-11-10 DIAGNOSIS — R35.0 FREQUENCY OF MICTURITION: ICD-10-CM

## 2021-11-10 DIAGNOSIS — R31.29 OTHER MICROSCOPIC HEMATURIA: ICD-10-CM

## 2021-11-10 DIAGNOSIS — Z90.49 ACQUIRED ABSENCE OF OTHER SPECIFIED PARTS OF DIGESTIVE TRACT: Chronic | ICD-10-CM

## 2021-11-10 DIAGNOSIS — D17.71 BENIGN LIPOMATOUS NEOPLASM OF KIDNEY: ICD-10-CM

## 2021-11-10 PROCEDURE — 52000 CYSTOURETHROSCOPY: CPT

## 2021-11-10 PROCEDURE — 88112 CYTOPATH CELL ENHANCE TECH: CPT | Mod: 26

## 2021-11-12 LAB — URINE CYTOLOGY: NORMAL

## 2021-11-15 ENCOUNTER — APPOINTMENT (OUTPATIENT)
Dept: PULMONOLOGY | Facility: CLINIC | Age: 71
End: 2021-11-15
Payer: MEDICARE

## 2021-11-15 VITALS
SYSTOLIC BLOOD PRESSURE: 118 MMHG | OXYGEN SATURATION: 95 % | TEMPERATURE: 208.4 F | DIASTOLIC BLOOD PRESSURE: 75 MMHG | HEART RATE: 89 BPM

## 2021-11-15 PROCEDURE — 99214 OFFICE O/P EST MOD 30 MIN: CPT | Mod: 25

## 2021-11-15 PROCEDURE — 90662 IIV NO PRSV INCREASED AG IM: CPT

## 2021-11-15 PROCEDURE — G0008: CPT

## 2021-11-15 RX ORDER — METHYLPREDNISOLONE 4 MG/1
4 TABLET ORAL
Qty: 1 | Refills: 0 | Status: DISCONTINUED | COMMUNITY
Start: 2021-10-06 | End: 2021-11-15

## 2021-11-15 RX ORDER — MONTELUKAST 10 MG/1
10 TABLET, FILM COATED ORAL
Qty: 90 | Refills: 1 | Status: DISCONTINUED | COMMUNITY
Start: 2021-10-25 | End: 2021-11-15

## 2021-11-15 RX ORDER — DOXYCYCLINE HYCLATE 100 MG/1
100 CAPSULE ORAL
Qty: 10 | Refills: 0 | Status: DISCONTINUED | COMMUNITY
Start: 2021-10-06 | End: 2021-11-15

## 2021-11-15 NOTE — REVIEW OF SYSTEMS
[Postnasal Drip] : postnasal drip [Cough] : cough [Chest Tightness] : chest tightness [Frequent URIs] : frequent URIs [Sputum] : sputum [Wheezing] : wheezing [Nasal Discharge] : nasal discharge [Negative] : Psychiatric [Fever] : no fever [Fatigue] : no fatigue [Recent Wt Gain (___ Lbs)] : ~T no recent weight gain [Chills] : no chills [Poor Appetite] : no poor appetite [Hemoptysis] : no hemoptysis [Dyspnea] : no dyspnea [Pleuritic Pain] : no pleuritic pain [A.M. Dry Mouth] : no a.m. dry mouth [SOB on Exertion] : no sob on exertion [Headache] : no headache [Focal Weakness] : no focal weakness [Dizziness] : no dizziness [Seizures] : no seizures [Numbness] : no numbness [Memory Loss] : no memory loss [Paralysis] : no paralysis [Confusion] : no confusion [Diabetes] : no diabetes [TextBox_44] : MR [TextBox_83] : History renal angiomyolipoma [TextBox_122] : nocturnal myoclonus, reports polio as a child without residual  [TextBox_144] : Thyroid nodule, Hypothyroidism

## 2021-11-15 NOTE — DISCUSSION/SUMMARY
[FreeTextEntry1] : Mild MAEGAN  AHI 7 with excessive daytime sleepiness\par Asthma\par Abnormal CXR CT CHEST\par Bronchiectasis- \par IgA deficiency\par Hx as  noted\par hypothyroid\par Childhood polio\par Mild MR\par 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\par noted just below normal IGA and low IgG 2 but demonstrates AB to strep  and tetanus\par \par Recommendations\par 6 min walk- JUly-RS\par NIOX\par TX\par restart Wixela 250-50 mcg 1 puff BID and Rinse- only using QD sec leg  cramps plus voice issue-HOLD\par  BREZTRI 2 puffs BID \par   singulair with pm dinner- HA change Accolate\par  prn Albuterol\par  NACL in NEB only BID\par no indication Antibx\par Issue immuno serology IGA and IgG  with subtypes A1AT titer HP panel and possible autoimmune serology- reviewed\par To consider VEST therapy if Bronchiectasis confirmed  with CT CHEST but requests to hold\par Pneumonia vaccines up to  date post 67\par Oral  Appliance- then  restudy and make decision re CPAP- not active use f/u Dr Mckeon\par COVID booster recommended PFIZER

## 2021-11-17 ENCOUNTER — OUTPATIENT (OUTPATIENT)
Dept: OUTPATIENT SERVICES | Facility: HOSPITAL | Age: 71
LOS: 1 days | End: 2021-11-17
Payer: MEDICARE

## 2021-11-17 ENCOUNTER — APPOINTMENT (OUTPATIENT)
Dept: ULTRASOUND IMAGING | Facility: IMAGING CENTER | Age: 71
End: 2021-11-17
Payer: MEDICARE

## 2021-11-17 ENCOUNTER — APPOINTMENT (OUTPATIENT)
Dept: MAMMOGRAPHY | Facility: IMAGING CENTER | Age: 71
End: 2021-11-17
Payer: MEDICARE

## 2021-11-17 DIAGNOSIS — Z00.8 ENCOUNTER FOR OTHER GENERAL EXAMINATION: ICD-10-CM

## 2021-11-17 DIAGNOSIS — Z90.49 ACQUIRED ABSENCE OF OTHER SPECIFIED PARTS OF DIGESTIVE TRACT: Chronic | ICD-10-CM

## 2021-11-17 PROCEDURE — 77066 DX MAMMO INCL CAD BI: CPT | Mod: 26

## 2021-11-17 PROCEDURE — 77066 DX MAMMO INCL CAD BI: CPT

## 2021-11-17 PROCEDURE — 76641 ULTRASOUND BREAST COMPLETE: CPT

## 2021-11-17 PROCEDURE — 76641 ULTRASOUND BREAST COMPLETE: CPT | Mod: 26,50

## 2021-11-17 PROCEDURE — G0279: CPT | Mod: 26

## 2021-11-17 PROCEDURE — G0279: CPT

## 2021-12-13 ENCOUNTER — APPOINTMENT (OUTPATIENT)
Dept: PULMONOLOGY | Facility: CLINIC | Age: 71
End: 2021-12-13
Payer: MEDICARE

## 2021-12-13 VITALS
HEART RATE: 84 BPM | OXYGEN SATURATION: 94 % | TEMPERATURE: 208.04 F | SYSTOLIC BLOOD PRESSURE: 114 MMHG | DIASTOLIC BLOOD PRESSURE: 76 MMHG

## 2021-12-13 PROCEDURE — 99214 OFFICE O/P EST MOD 30 MIN: CPT

## 2021-12-13 NOTE — PROCEDURE
[FreeTextEntry1] : Chest x-ray PA lateral October 25, 2021\par Cardiac size is normal\par Lower lumbar sacral scoliosis\par Bronchiectatic changes right lower lung zone\par No parenchymal consolidation pleural effusions pneumothorax\par Mediastinum hilum unremarkable\par IMP no evidence for pneumonia\par \par PFT 10/6/21\par Flow rates nl\par TLC 89 %\par  DLCO  87 %\par HGB 15.0\par \par Sleep study\par April 29–April 30, 2021\par Overall mild obstructive sleep apnea\par AHI 7\par Time spent less than 90% on room air 4.3%\par Mean O2 saturation 94.5%\par Recommendation address treatment protocol with auto CPAP\par PFT 7/28/21\par Flow Rtaes nl\par Lung Volumes nl\par TTLC 95 %\par DLCO 91 % nl\par HGB 15.0\par PFT 4/22/21\par Flow  rates nl\par Lung Volumes Nl\par DLCO 96 % nl range\par  HGB 11.2\par NIOX  11 ppb 4/22/21\par \par PFT with body box August 12, 2020\par Normal flow rates\par Mild obstructive pattern with an FEV1 FVC 74\par No bronchodilator response at FEV1\par 20% response at small airways\par Normal lung volumes are\par Noted increased % predicted.\par Specific inductance and resistance normal\par Diffusion normal 95% predicted.\par Hemoglobin 13.79\par \par Pulmonary 6-minute walk step stress test August 12, 2020\par Total steps 1/10/2017\par Baseline room air O2 saturation 96%\par Desaturation at minute 6  89% with immediate recovery to 96%\par Impression minimal O2 desaturation\par No indication for portable oxygen therapy\par \par Chest x-ray PA lateral August 12, 2020\par Normal cardiac size\par Scarring left lower lung zone cannot exclude component of bronchiectasis\par Right lung is otherwise clear\par No evidence for jodi adenopathy\par No dominant pulmonary nodules or pneumothorax\par Impression bronchiectatic change left lower lung zone\par \par Blood Draw\par Persistent mild reduction IgA normal range 70\par Data review\par Alpha-1 antitrypsin titer negative\par 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

## 2021-12-13 NOTE — HISTORY OF PRESENT ILLNESS
[Former] : former [< 30 pack-years] : < 30 pack-years [TextBox_4] :   chest  congestion \par cough  with pale yellow mucous\par Nocturnal symptoms cough leading to wheeze\par No purulent sputum reports clear to white\par No hemoptysis\par No fevers chills or sweats\par Has completed 2 courses of steroids 2 courses of antibiotics with persistence of the cough\par \par MAEGAN issue MC coverage with oral appliance with f/u Sleep  dentist Dr Mckeon \par \par post Fall months  ago and noted rib fx Right\par  Otherwise states doing very well with Wixela\par  No inc use KARLOS\par \par 70-year-old female \par Complicated pulmonary history to be reviewed\par There is a diagnosis of asthma and she reports bronchiectasis with multiple histories of pneumonia at least 4-5 reported last very ill November December January with an additional illness in March\par She reports abnormal chest CTs and chest x-rays\par Last chest CT she reports was approximately 2018 where she was told of having scarlike tissue\par With these episodes she describes cough chest tightness wheezing sputum production\par Medications through the winter included Y UL P DARION in the nebulizer, Brovana right axilla sodium chloride nebulizer nasal Astelin nasal Flonase albuterol HFA course of prednisone and antibiotics including doxycycline x2 courses and Zithromax.\par \par She states 1 to 1-1/2 weeks ago she developed some trouble breathing self started treatment with albuterol\par She did not receive any antibiotics or steroids with this symptomatology most recently noted\par She does not report at present any sputum production for the past several days and it is clear without hemoptysis purulence or foul smelling\par She also states that she was sick in March there was some question whether she could have COVID-19 but a COVID  19 PCR and antibody testing subsequently was negative\par Other significant history includes a bronchoscopy dating back to April 2019 which was negative including AFB fungus mold and  negative TBLBx.\par Last chest x-ray available for review dates back to April 16, 2019 which was status post a transbronchial biopsy.\par Thickening of the minor fissure which was noted and reported chronic\par Reticulonodular opacities right midlung\par Ill-defined focal opacity periphery left upper to mid lung with some central extension reported to the left hilum with out pneumothorax pleural effusion or significant volume loss.\par \par Additional history\par Right breast DCIS treated radiation therapy 2017\par Childhood polio\par Mild mitral regurgitation\par Hypothyroidism\par Nocturnal myoclonus\par \par Vaccination profile flu vaccinations up-to-date\par 2018 2019 up-to-date with Prevnar and PCV 23\par \par  [TextBox_11] : 1 [YearQuit] : 1981

## 2021-12-16 ENCOUNTER — APPOINTMENT (OUTPATIENT)
Dept: OBGYN | Facility: CLINIC | Age: 71
End: 2021-12-16
Payer: MEDICARE

## 2021-12-16 VITALS
DIASTOLIC BLOOD PRESSURE: 77 MMHG | HEIGHT: 66 IN | SYSTOLIC BLOOD PRESSURE: 136 MMHG | BODY MASS INDEX: 21.21 KG/M2 | WEIGHT: 132 LBS | HEART RATE: 69 BPM

## 2021-12-16 PROCEDURE — G0101: CPT

## 2022-01-07 ENCOUNTER — NON-APPOINTMENT (OUTPATIENT)
Age: 72
End: 2022-01-07

## 2022-01-24 LAB — SARS-COV-2 N GENE NPH QL NAA+PROBE: DETECTED

## 2022-01-25 ENCOUNTER — APPOINTMENT (OUTPATIENT)
Dept: PULMONOLOGY | Facility: CLINIC | Age: 72
End: 2022-01-25
Payer: MEDICARE

## 2022-01-25 PROCEDURE — 99442: CPT | Mod: CS,95

## 2022-03-17 ENCOUNTER — APPOINTMENT (OUTPATIENT)
Dept: PULMONOLOGY | Facility: CLINIC | Age: 72
End: 2022-03-17
Payer: MEDICARE

## 2022-03-17 ENCOUNTER — NON-APPOINTMENT (OUTPATIENT)
Age: 72
End: 2022-03-17

## 2022-03-17 VITALS
HEART RATE: 74 BPM | SYSTOLIC BLOOD PRESSURE: 116 MMHG | OXYGEN SATURATION: 97 % | BODY MASS INDEX: 21.66 KG/M2 | HEIGHT: 65 IN | RESPIRATION RATE: 14 BRPM | DIASTOLIC BLOOD PRESSURE: 72 MMHG | TEMPERATURE: 207.32 F | WEIGHT: 130 LBS

## 2022-03-17 LAB — POCT - HEMOGLOBIN (HGB), QUANTITATIVE, TRANSCUTANEOUS: 12.1

## 2022-03-17 PROCEDURE — 88738 HGB QUANT TRANSCUTANEOUS: CPT

## 2022-03-17 PROCEDURE — 94729 DIFFUSING CAPACITY: CPT

## 2022-03-17 PROCEDURE — 99214 OFFICE O/P EST MOD 30 MIN: CPT | Mod: CS,25

## 2022-03-17 PROCEDURE — 94010 BREATHING CAPACITY TEST: CPT

## 2022-03-17 PROCEDURE — ZZZZZ: CPT

## 2022-03-17 PROCEDURE — 94727 GAS DIL/WSHOT DETER LNG VOL: CPT

## 2022-03-17 NOTE — HISTORY OF PRESENT ILLNESS
[TextBox_4] :  \par Did  not tolerate Oral appliance\par  chest  congestion \par cough  with pale yellow mucous\par Nocturnal symptoms cough leading to wheeze\par No purulent sputum reports clear to white\par No hemoptysis\par No fevers chills or sweats\par Has completed 2 courses of steroids 2 courses of antibiotics with persistence of the cough\par \par MAEGAN issue MC coverage with oral appliance with f/u Sleep  dentist Dr Mckeon \par \par post Fall months  ago and noted rib fx Right\par  Otherwise states doing very well with Wixela\par  No inc use KARLOS\par \par 70-year-old female \par Complicated pulmonary history to be reviewed\par There is a diagnosis of asthma and she reports bronchiectasis with multiple histories of pneumonia at least 4-5 reported last very ill November December January with an additional illness in March\par She reports abnormal chest CTs and chest x-rays\par Last chest CT she reports was approximately 2018 where she was told of having scarlike tissue\par With these episodes she describes cough chest tightness wheezing sputum production\par Medications through the winter included Y UL P DARION in the nebulizer, Brovana right axilla sodium chloride nebulizer nasal Astelin nasal Flonase albuterol HFA course of prednisone and antibiotics including doxycycline x2 courses and Zithromax.\par \par She states 1 to 1-1/2 weeks ago she developed some trouble breathing self started treatment with albuterol\par She did not receive any antibiotics or steroids with this symptomatology most recently noted\par She does not report at present any sputum production for the past several days and it is clear without hemoptysis purulence or foul smelling\par She also states that she was sick in March there was some question whether she could have COVID-19 but a COVID  19 PCR and antibody testing subsequently was negative\par Other significant history includes a bronchoscopy dating back to April 2019 which was negative including AFB fungus mold and  negative TBLBx.\par Last chest x-ray available for review dates back to April 16, 2019 which was status post a transbronchial biopsy.\par Thickening of the minor fissure which was noted and reported chronic\par Reticulonodular opacities right midlung\par Ill-defined focal opacity periphery left upper to mid lung with some central extension reported to the left hilum with out pneumothorax pleural effusion or significant volume loss.\par \par Additional history\par Right breast DCIS treated radiation therapy 2017\par Childhood polio\par Mild mitral regurgitation\par Hypothyroidism\par Nocturnal myoclonus\par \par Vaccination profile flu vaccinations up-to-date\par 2018 2019 up-to-date with Prevnar and PCV 23\par \par  [TextBox_11] : 1 [YearQuit] : 1981

## 2022-03-17 NOTE — REVIEW OF SYSTEMS
[Fever] : no fever [Fatigue] : no fatigue [Recent Wt Gain (___ Lbs)] : ~T no recent weight gain [Chills] : no chills [Poor Appetite] : no poor appetite [Hemoptysis] : no hemoptysis [Dyspnea] : no dyspnea [Pleuritic Pain] : no pleuritic pain [A.M. Dry Mouth] : no a.m. dry mouth [SOB on Exertion] : no sob on exertion [Headache] : no headache [Focal Weakness] : no focal weakness [Seizures] : no seizures [Dizziness] : no dizziness [Numbness] : no numbness [Memory Loss] : no memory loss [Paralysis] : no paralysis [Confusion] : no confusion [Diabetes] : no diabetes [TextBox_44] : MR [TextBox_83] : History renal angiomyolipoma [TextBox_144] : Thyroid nodule, Hypothyroidism [TextBox_122] : nocturnal myoclonus, reports polio as a child without residual

## 2022-03-17 NOTE — DISCUSSION/SUMMARY
[FreeTextEntry1] : Mild MAEGAN  AHI 7 with excessive daytime sleepiness 4/29-30/2021\par Asthma\par Abnormal CXR CT CHEST\par Bronchiectasis- \par IgA deficiency\par Hx as  noted\par hypothyroid\par Childhood polio\par Mild MR\par 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\par noted just below normal IGA and low IgG 2 but demonstrates AB to strep  and tetanus\par \par Recommendations\par 6 min walk- JUly-RS\par NIOX\par TX\par restart Wixela 250-50 mcg 1 puff BID and Rinse- only using QD sec leg  cramps plus voice issue-HOLD\par  BREZTRI 2 puffs BID \par   singulair with pm dinner- HA change Accolate\par  prn Albuterol\par  NACL in NEB only BID\par no indication Antibx\par Issue immuno serology IGA and IgG  with subtypes A1AT titer HP panel and possible autoimmune serology- reviewed\par To consider VEST therapy if Bronchiectasis confirmed  with CT CHEST but requests to hold\par Pneumonia vaccines up to  date post 67\par Oral  Appliance- then  restudy and make decision re CPAP- not active use f/u Dr Mckeon\par COVID booster recommended PFIZER\par Look into Acapella device

## 2022-03-17 NOTE — PROCEDURE
[FreeTextEntry1] : PFT 3/17/22\par Flow rates nl\par  Lung Volumes nl\par TLC 97 %\par  DLCO 89 %\par HGB 12.1\par \par Chest x-ray PA lateral October 25, 2021\par Cardiac size is normal\par Lower lumbar sacral scoliosis\par Bronchiectatic changes right lower lung zone\par No parenchymal consolidation pleural effusions pneumothorax\par Mediastinum hilum unremarkable\par IMP no evidence for pneumonia\par \par PFT 10/6/21\par Flow rates nl\par TLC 89 %\par  DLCO  87 %\par HGB 15.0\par \par Sleep study\par April 29–April 30, 2021\par Overall mild obstructive sleep apnea\par AHI 7\par Time spent less than 90% on room air 4.3%\par Mean O2 saturation 94.5%\par Recommendation address treatment protocol with auto CPAP\par PFT 7/28/21\par Flow Rtaes nl\par Lung Volumes nl\par TTLC 95 %\par DLCO 91 % nl\par HGB 15.0\par PFT 4/22/21\par Flow  rates nl\par Lung Volumes Nl\par DLCO 96 % nl range\par  HGB 11.2\par NIOX  11 ppb 4/22/21\par \par PFT with body box August 12, 2020\par Normal flow rates\par Mild obstructive pattern with an FEV1 FVC 74\par No bronchodilator response at FEV1\par 20% response at small airways\par Normal lung volumes are\par Noted increased % predicted.\par Specific inductance and resistance normal\par Diffusion normal 95% predicted.\par Hemoglobin 13.79\par \par Pulmonary 6-minute walk step stress test August 12, 2020\par Total steps 1/10/2017\par Baseline room air O2 saturation 96%\par Desaturation at minute 6  89% with immediate recovery to 96%\par Impression minimal O2 desaturation\par No indication for portable oxygen therapy\par \par Chest x-ray PA lateral August 12, 2020\par Normal cardiac size\par Scarring left lower lung zone cannot exclude component of bronchiectasis\par Right lung is otherwise clear\par No evidence for jodi adenopathy\par No dominant pulmonary nodules or pneumothorax\par Impression bronchiectatic change left lower lung zone\par \par Blood Draw\par Persistent mild reduction IgA normal range 70\par Data review\par Alpha-1 antitrypsin titer negative\par 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

## 2022-03-20 ENCOUNTER — NON-APPOINTMENT (OUTPATIENT)
Age: 72
End: 2022-03-20

## 2022-04-27 ENCOUNTER — NON-APPOINTMENT (OUTPATIENT)
Age: 72
End: 2022-04-27

## 2022-04-27 ENCOUNTER — APPOINTMENT (OUTPATIENT)
Dept: PULMONOLOGY | Facility: CLINIC | Age: 72
End: 2022-04-27
Payer: MEDICARE

## 2022-04-27 VITALS
HEART RATE: 87 BPM | OXYGEN SATURATION: 96 % | DIASTOLIC BLOOD PRESSURE: 73 MMHG | SYSTOLIC BLOOD PRESSURE: 126 MMHG | RESPIRATION RATE: 14 BRPM

## 2022-04-27 DIAGNOSIS — Z20.822 CONTACT WITH AND (SUSPECTED) EXPOSURE TO COVID-19: ICD-10-CM

## 2022-04-27 LAB
RAPID RVP RESULT: NOT DETECTED
SARS-COV-2 RNA PNL RESP NAA+PROBE: NOT DETECTED

## 2022-04-27 PROCEDURE — 71046 X-RAY EXAM CHEST 2 VIEWS: CPT

## 2022-04-27 PROCEDURE — 99214 OFFICE O/P EST MOD 30 MIN: CPT | Mod: CS,25

## 2022-04-27 NOTE — PROCEDURE
[FreeTextEntry1] : Chest x-ray PA lateral April 27, 2022\par Normal cardiac size\par Positive for scar right midlung zone\par No pleural effusions pneumothorax\par Bronchiectatic change right lower lung zone\par No evidence for parenchymal consolidation\par No interval change compared to chest x-ray October 25, 2021\par \par PFT 3/17/22\par Flow rates nl\par  Lung Volumes nl\par TLC 97 %\par  DLCO 89 %\par HGB 12.1\par \par Chest x-ray PA lateral October 25, 2021\par Cardiac size is normal\par Lower lumbar sacral scoliosis\par Bronchiectatic changes right lower lung zone\par No parenchymal consolidation pleural effusions pneumothorax\par Mediastinum hilum unremarkable\par IMP no evidence for pneumonia\par \par PFT 10/6/21\par Flow rates nl\par TLC 89 %\par  DLCO  87 %\par HGB 15.0\par \par Sleep study\par April 29–April 30, 2021\par Overall mild obstructive sleep apnea\par AHI 7\par Time spent less than 90% on room air 4.3%\par Mean O2 saturation 94.5%\par Recommendation address treatment protocol with auto CPAP\par PFT 7/28/21\par Flow Rtaes nl\par Lung Volumes nl\par TTLC 95 %\par DLCO 91 % nl\par HGB 15.0\par PFT 4/22/21\par Flow  rates nl\par Lung Volumes Nl\par DLCO 96 % nl range\par  HGB 11.2\par NIOX  11 ppb 4/22/21\par \par PFT with body box August 12, 2020\par Normal flow rates\par Mild obstructive pattern with an FEV1 FVC 74\par No bronchodilator response at FEV1\par 20% response at small airways\par Normal lung volumes are\par Noted increased % predicted.\par Specific inductance and resistance normal\par Diffusion normal 95% predicted.\par Hemoglobin 13.79\par \par Pulmonary 6-minute walk step stress test August 12, 2020\par Total steps 1/10/2017\par Baseline room air O2 saturation 96%\par Desaturation at minute 6  89% with immediate recovery to 96%\par Impression minimal O2 desaturation\par No indication for portable oxygen therapy\par \par Chest x-ray PA lateral August 12, 2020\par Normal cardiac size\par Scarring left lower lung zone cannot exclude component of bronchiectasis\par Right lung is otherwise clear\par No evidence for jodi adenopathy\par No dominant pulmonary nodules or pneumothorax\par Impression bronchiectatic change left lower lung zone\par \par Blood Draw\par Persistent mild reduction IgA normal range 70\par Data review\par Alpha-1 antitrypsin titer negative\par 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

## 2022-04-27 NOTE — HISTORY OF PRESENT ILLNESS
[Former] : former [TextBox_4] :  Just received RESMED CPAP device\par Did  not tolerate Oral appliance\par  chest  congestion more  acute\par cough  with pale yellow mucous\par Nocturnal symptoms cough leading to wheeze\par No purulent sputum reports clear to white\par No hemoptysis\par No fevers chills or sweats\par Has completed 2 courses of steroids 2 courses of antibiotics with persistence of the cough\par \par MAEGAN issue MC coverage with oral appliance with f/u Sleep  dentist Dr Mckeon \par \par post Fall months  ago and noted rib fx Right\par  Otherwise states doing very well with Wixela\par  No inc use KARLOS\par \par 70-year-old female \par Complicated pulmonary history to be reviewed\par There is a diagnosis of asthma and she reports bronchiectasis with multiple histories of pneumonia at least 4-5 reported last very ill November December January with an additional illness in March\par She reports abnormal chest CTs and chest x-rays\par Last chest CT she reports was approximately 2018 where she was told of having scarlike tissue\par With these episodes she describes cough chest tightness wheezing sputum production\par Medications through the winter included Y UL P DARION in the nebulizer, Brovana right axilla sodium chloride nebulizer nasal Astelin nasal Flonase albuterol HFA course of prednisone and antibiotics including doxycycline x2 courses and Zithromax.\par \par She states 1 to 1-1/2 weeks ago she developed some trouble breathing self started treatment with albuterol\par She did not receive any antibiotics or steroids with this symptomatology most recently noted\par She does not report at present any sputum production for the past several days and it is clear without hemoptysis purulence or foul smelling\par She also states that she was sick in March there was some question whether she could have COVID-19 but a COVID  19 PCR and antibody testing subsequently was negative\par Other significant history includes a bronchoscopy dating back to April 2019 which was negative including AFB fungus mold and  negative TBLBx.\par Last chest x-ray available for review dates back to April 16, 2019 which was status post a transbronchial biopsy.\par Thickening of the minor fissure which was noted and reported chronic\par Reticulonodular opacities right midlung\par Ill-defined focal opacity periphery left upper to mid lung with some central extension reported to the left hilum with out pneumothorax pleural effusion or significant volume loss.\par \par Additional history\par Right breast DCIS treated radiation therapy 2017\par Childhood polio\par Mild mitral regurgitation\par Hypothyroidism\par Nocturnal myoclonus\par \par Vaccination profile flu vaccinations up-to-date\par 2018 2019 up-to-date with Prevnar and PCV 23\par \par  [TextBox_11] : 1 [YearQuit] : 1979

## 2022-04-27 NOTE — DISCUSSION/SUMMARY
[FreeTextEntry1] : Hx COVID infection Dec 2021\par Mild MAEGAN  AHI 7 with excessive daytime sleepiness 4/29-30/2021\par Asthma\par Abnormal CXR CT CHEST\par Bronchiectasis-positive mild flare\par IgA deficiency\par Hx as  noted\par hypothyroid\par Childhood polio\par Mild MR\par 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\par noted just below normal IGA and low IgG 2 but demonstrates AB to strep  and tetanus\par \par Recommendations\par 6 min walk- JUly-RS\par NIOX\par TX\par restart Wixela 250-50 mcg 1 puff BID and Rinse- only using QD sec leg  cramps plus voice issue-HOLD\par  BREZTRI 2 puffs BID \par   singulair with pm dinner- HA change Accolate\par  prn Albuterol\par  NACL in NEB only BID\par no indication Antibx\par Issue immuno serology IGA and IgG  with subtypes A1AT titer HP panel and possible autoimmune serology- reviewed\par To consider VEST therapy if Bronchiectasis confirmed  with CT CHEST but requests to hold\par Pneumonia vaccines up to  date post 67\par Oral  Appliance- then  restudy and make decision re CPAP- not active use f/u Dr Mckeon\par COVID booster recommended PFIZER\par Look into Acapella device using device with improvement\par Rx medrol evangelina\par  Mucinex \par Allergy Sulfa PCN Levaquin\par DOXY

## 2022-05-10 ENCOUNTER — APPOINTMENT (OUTPATIENT)
Dept: UROLOGY | Facility: CLINIC | Age: 72
End: 2022-05-10

## 2022-05-11 ENCOUNTER — APPOINTMENT (OUTPATIENT)
Dept: GASTROENTEROLOGY | Facility: CLINIC | Age: 72
End: 2022-05-11
Payer: MEDICARE

## 2022-05-11 VITALS
SYSTOLIC BLOOD PRESSURE: 118 MMHG | HEIGHT: 65 IN | BODY MASS INDEX: 21.66 KG/M2 | DIASTOLIC BLOOD PRESSURE: 64 MMHG | WEIGHT: 130 LBS | OXYGEN SATURATION: 97 % | HEART RATE: 78 BPM

## 2022-05-11 DIAGNOSIS — Z12.11 ENCOUNTER FOR SCREENING FOR MALIGNANT NEOPLASM OF COLON: ICD-10-CM

## 2022-05-11 PROCEDURE — 99213 OFFICE O/P EST LOW 20 MIN: CPT

## 2022-05-11 NOTE — HISTORY OF PRESENT ILLNESS
[FreeTextEntry1] : Office revisit on 2022.\par \par Present for followup in preparation for a surveillance colonoscopy.\par \par PREVIOUS HISTORY:\par \par This patient's history of an epigastric burning pain has been intermittent and episodic of many years duration. The patient previously underwent an upper endoscopy on 05 at which time nonerosive antral erythema was noted. Testing for Helicobacter pylori was negative. A duodenal biopsy was normal without any features of celiac sprue. The patient has utilized TUMS. She reports no other medical therapy.\par \par The patient underwent a colonoscopy on 05 which was normal except for hemorrhoids. \par \par A colonoscopy on 6/28/10 to evaluate hematochezia revealed a 3 mm cecal adenoma, a nonbleeding proximal ascending colon angioectasia and hemorrhoids. \par \par The patient has a family history of colon cancer and she indicates her mother was diagnosed at age 69. Her sister was treated for thyroid cancer and endometrial cancer.\par \par The patient underwent a laparoscopic cholecystectomy in approximately . She had gallstones. She indicates that her previous biliary pain was epigastric and right upper quadrant and was somewhat different from her epigastric burning pain. That previous pain resolved.\par \par The patient has a history of an increase in liver enzymes. She reports this has been very mild. She is unclear as to the etiology for this.\par \par Colonoscopy on 14 was noted for the detection of a very sessile approximately 3 cm flat cecal polyp consistent with a sessile serrated polyp. An associated 6 mm apparent flexure sessile serrated polyp with no dysplasia was removed.\par \par EGD on 14 revealed a normal esophagus. Mild patchy antral erythema was noted. Gastric biopsy was normal. Testing for Helicobacter pylori was negative. Visualized duodenum was normal.\par \par Colonoscopy on 2014 by Dr. Shine Zaragoza was note for removal of a 2 cm cecal sessile serrated polyp.\par \par Colonoscopy on 1/15/16 was normal except for hemorrhoids.\par \par CURRENT HISTORY:\par \par ORV 3/31/17:    -The patient complains of chronic diarrhea. Typically has between 1-4 diarrheal stools daily. Various consistency. Can be soft or even watery. Most of the time the stools are soft but 1-2 times per week she will have a watery stool. Never observed blood, mucus or oil. Fatty foods, red sauces, beef, cheese, bread or any fatty or oily food is provocative. Does better on a more bland diet such as chicken, vegetables and yogurt. Will subsequently have a more normal bowel movement.\par \par                          -Long-standing complaint of epigastric burning discomfort. In association with epigastric burning sensation this will be alleviated after moving her bowels. May experience  left upper quadrant discomfort before her bowel movement.\par \par                         -Frequency of diarrhea increased following cholecystectomy.\par \par                         -Denies fever. Stable appetite. Weight gain of 10-12 pound reported. Denies any complaints of dysphagia, heartburn, nausea or vomiting. Has chronic symptoms of bloating, gas and excessive flatus.\par \par ORV 17:    -Continues to experience diarrhea. Now described as mostly diarrhea but with intermittent episodes of constipation.\par                         -Diarrhea described as approximately 3 loose bowel movements of variable consistency that can range from being watery to mushy. Mostly occurs in the morning before or after breakfast. Typically does better later in the day and patient denies nocturnal occurrence. Fatty foods can be provocative of the diarrhea. Never observes blood or oil in the stool. Approximately 1 day out of the week she feels constipated and describes straining with passage of a formed brown stool. Of note, the patient does not take Imodium, Lomotil or Kaopectate to remedy the diarrhea.\par                         -Experiences a left-sided abdominal pain described as a constant ache that fluctuates in severity and which is nonradiating. Affects the left upper quadrant, left midabdomen and left lower quadrant. No precipitating factors. Possibly increased in severity after ingestion of bread and pasta. Patient is concerned regarding gluten sensitivity. Discomfort is also somewhat increased before and after moving her bowels. Abdominal discomfort is somewhat less if she limits her self to a bland diet such as eggs, salad and vegetables. The discomfort is moderate in severity rated at 4/10. Never awakens her from sleep.\par                       -Denies fever or oral ulcers. Stable appetite. Patient reports a 10 pound weight increase over the past 2 years. Reports her weight gain is from "eating too much". Denies any complaints of dysphagia, heartburn, nausea or vomiting. Does experience gaseous symptoms of bloating and distention which is diffuse and different from her left side abdominal pain. Patient under increased stress.\par \par ORV 10/16/18:     -For the past year the patient reports experiencing an intermittent left upper quadrant pain primarily localized to the left costal margin region. Intermittent discomfort has been described typically postprandial and often exacerbated by gluten ingestion. She feels better after restricting gluten products. Some decrease in discomfort also noted after bowel movement. This is described as a nonradiating aching-like discomfort.\par                -Appetite and weight are stable. Denies any complaints of dysphagia, heartburn, nausea or vomiting. The patient typically has 2 formed bowel movements daily. Occasionally observes thin stools. On occasion she can have intermittent episodes of diarrhea sometime exacerbated by meat ingestion. Denies any rectal bleeding. Never has constipation.\par                   -The patient indicates experiencing recurrent episodes of pneumonia. Over the past year or so she has had 4 episodes. Patient was treated for pneumonia 2017 and 2018. Following the last episode the patient was evaluated by pulmonary and treated with a regimen of steroids which have since been discontinued. Patient is still on symbicort. She occasionally feels a sense of tightness in her chest with deep breath. However, she denies dyspnea, cough, wheezing or chest pain at the current time. She can occasionally experienced postnasal drip and utilizes nasal irrigation. Her exercise tolerance is good and she can walk one to 2 miles without difficulty.\par                -The patient was treated for a right breast cancer in situ and underwent surgery 2017 and completed radiation therapy in 2018.\par \par COLONOSCOPY 2019:     - Surveillance colonoscopy was performed on 19. Patient previously had removal of a 2 cm sessile serrated colon polyp. Family history of colon cancer reported with patient's mother diagnosed at age 69. Patient's last colonoscopy of 2016 did not reveal metachronous colon polyps. In addition, patient has diarrhea following cholecystectomy.\par                                               -Total colonoscopy was performed to the cecum with ileoscopy. Normal terminal ileum. Normal colonoscopy examination except for hemorrhoids. Angulation and fixation was noted in the region of the sigmoid likely related to prior  section and hysterectomy. Colonoscopy examination was completed with switch to a pediatric colonoscope. Surveillance colonoscopy is advised in 3 years. \par \par ORV 2021:     -Had abdominal ultrasound on 2020 apparently for assessment of possible rib fracture.  Report noted for mildly diffuse hepatic hyperechoic features consistent with fatty infiltration.  Liver at upper normal size measuring 14.6 cm.  Focal hepatic lesions not detected.  Patient status post cholecystectomy.  Previously had laboratory assessment on 2020 noted for normal liver enzymes except for borderline elevated total bilirubin including total protein 7.1, albumin 4.1, bilirubin 1.3, alkaline phosphatase 50, AST 22 and ALT 21.  Hemoglobin A1c was borderline elevated at 5.8.  CBC was normal including platelet count of 290,000.\par                             -Since ultrasound findings patient has decreased alcohol intake.  Of note, had been drinking up to 2 glasses of wine daily.  Now has cut back to 1 glass few times per week.  In addition, also has somewhat modified diet with avoidance of fatty foods.  Trying to exercise more.\par                             -Describes respiratory illness from 2019 up until 2020 with complaints of dyspnea, fatigue, myalgias and arthralgias.  Questions whether she might have had Covid but no testing available at that time.  Subsequent antibody testing negative.  In association with the symptoms was experiencing gas and bloating.\par                             -Feels better at current time.  Denies fever.  Appetite and weight are stable.  No dysphagia to liquids.  She has a longstanding occasional sensation of transient bolus hang up to solid foods at sternal notch region but this has been of many years duration and has not been progressive.  On occasion can experience a scratchy throat and sensation of vocal hoarseness.  Not currently experiencing any complaints of heartburn, nausea or vomiting.\par                            -Has history of chronic diarrhea.  Typically has between 1-4 diarrheal [place] 5-6 bowel movements daily.  Avoids gluten products which she feels helps.  Of note, patient is on a regimen of magnesium, fish oil and flaxseed.  Denies rectal bleeding.\par                            -Patient has a longstanding complaint of intermittent left upper quadrant abdominal pain almost at left costal margin.  Episodes occur approximately 3-4 times per week.  Lasts approximately 1 hour and is described as a nonradiating achy pain.  Questions whether gluten, beef or chocolate can be provocative.  As noted, this has been longstanding and has been nonprogressive.\par                              -Patient indicates some type of injury 2020 prompting concern over rib fracture leading to ultrasound revealing current hepatic findings.\par \par ORV 2022:     -Evaluated for follow-up in preparation for a surveillance colonoscopy.  Reports doing well at current time.  Indicates she recently started a vitamin supplement ("balance of nature") which she feels has helped.  Denies fever.  Appetite and weight are stable.  Denies any current complaints of dysphagia, heartburn, nausea, vomiting or abdominal pain.  Typically has 1 formed bowel movement daily without any complaints of diarrhea.  Of note, if ingests large amount of gluten products she could have a loose stool.  Denies any rectal bleeding.\par                              -Followed by pulmonary.  Indicates history of bronchiectasis attributed to RT for treatment of DCIS.  Also diagnosed with MAEGAN and is on CPAP.  On a Breztrie inhaler.\par

## 2022-05-11 NOTE — ASSESSMENT
[FreeTextEntry1] : Impression:\par \par #1 history of colonic polyps-status post removal of a 2 cm sessile serrated cecal polyp at the 14 colonoscopy. Normal surveillance colonoscopy performed on 1/15/16. Also with history of previous colonic adenoma.  Colonoscopy on 2019 was normal except for hemorrhoids.  Rule out metachronous lesions.\par \par #2 history of hepatic steatosis–ultrasound of 2020 noted for mildly diffuse hepatic hyperechoic features consistent with fatty infiltration with borderline hepatomegaly at 14 point centimeters.  Focal hepatic lesions not detected.  \par \par #3 history of chronic diarrhea-\par              -Temporally related to cholecystectomy. Cholereic postcholecystectomy diarrhea suspected.  Exacerbated by supplements–magnesium, flaxseed, fish oil.  Reports some improvement with gluten restriction–no indication of celiac disease but possible nonceliac gluten sensitivity or more likely improvement with avoidance of FODMAP containing foods.  Colonic biopsies at 2019 colonoscopy normal without indication of microscopic, collagenous or lymphocytic colitis.\par               -Resolved at current time.  No current complaint of diarrhea.\par \par #4 history of abdominal pain- \par                 -Left-sided abdominal discomfort affecting LUQ.  Chronic, longstanding and nonprogressive.  Likely functional etiology.  In addition, somewhat localized to left costal margin region raising question as to musculoskeletal etiology.\par                  -Resolved at current time.  No current complaint of abdominal pain.\par \par #5 status post laparoscopic cholecystectomy in .\par \par #6 history of elevated liver enzymes–normal liver enzymes at assessment of 2020 except for borderline total bilirubin (possibly Gilbert's).\par \par General medical problems:\par \par - status post LUZ MARINA/BSO.\par \par - status post  section x 2.\par \par - hypothyroidism- history of Hashimoto's thyroiditis.\par \par - family history of colon cancer-mother diagnosed at age 69.  In addition, a sister was treated for thyroid cancer and endometrial cancer.\par \par - recurrent pneumonia-status post 4 episodes mostly recently reported 2017 and 2018.\par \par - right breast carcinoma in situ-status post lumpectomy 2017 and radiation therapy 2018.\par \par -bronchiectasis.\par \par -MAEGAN on CPAP.

## 2022-06-10 LAB — SARS-COV-2 N GENE NPH QL NAA+PROBE: NOT DETECTED

## 2022-06-15 ENCOUNTER — APPOINTMENT (OUTPATIENT)
Dept: PULMONOLOGY | Facility: CLINIC | Age: 72
End: 2022-06-15
Payer: MEDICARE

## 2022-06-15 VITALS — OXYGEN SATURATION: 95 % | DIASTOLIC BLOOD PRESSURE: 65 MMHG | HEART RATE: 70 BPM | SYSTOLIC BLOOD PRESSURE: 105 MMHG

## 2022-06-15 DIAGNOSIS — U07.1 COVID-19: ICD-10-CM

## 2022-06-15 PROCEDURE — 99214 OFFICE O/P EST MOD 30 MIN: CPT | Mod: CS,25

## 2022-06-15 PROCEDURE — ZZZZZ: CPT

## 2022-06-15 PROCEDURE — 94010 BREATHING CAPACITY TEST: CPT

## 2022-06-15 PROCEDURE — 94729 DIFFUSING CAPACITY: CPT

## 2022-06-15 PROCEDURE — 94727 GAS DIL/WSHOT DETER LNG VOL: CPT

## 2022-06-15 NOTE — PROCEDURE
[FreeTextEntry1] :  PFT 6/15/22\par Flow rates nl\par  Lung Volumes nl\par DLCO 83 % \par HGB 11.3\par \par CPAP data compliance\par Usage through June 14, 2022\par Usage 100%\par Hours greater than 4\par AutoSet CPAP 6 to 16 cm H2O\par AHI 1.2\par \par \par Chest x-ray PA lateral April 27, 2022\par Normal cardiac size\par Positive for scar right midlung zone\par No pleural effusions pneumothorax\par Bronchiectatic change right lower lung zone\par No evidence for parenchymal consolidation\par No interval change compared to chest x-ray October 25, 2021\par \par PFT 3/17/22\par Flow rates nl\par  Lung Volumes nl\par TLC 97 %\par  DLCO 89 %\par HGB 12.1\par \par Chest x-ray PA lateral October 25, 2021\par Cardiac size is normal\par Lower lumbar sacral scoliosis\par Bronchiectatic changes right lower lung zone\par No parenchymal consolidation pleural effusions pneumothorax\par Mediastinum hilum unremarkable\par IMP no evidence for pneumonia\par \par PFT 10/6/21\par Flow rates nl\par TLC 89 %\par  DLCO  87 %\par HGB 15.0\par \par Sleep study\par April 29–April 30, 2021\par Overall mild obstructive sleep apnea\par AHI 7\par Time spent less than 90% on room air 4.3%\par Mean O2 saturation 94.5%\par Recommendation address treatment protocol with auto CPAP\par PFT 7/28/21\par Flow Rtaes nl\par Lung Volumes nl\par TTLC 95 %\par DLCO 91 % nl\par HGB 15.0\par PFT 4/22/21\par Flow  rates nl\par Lung Volumes Nl\par DLCO 96 % nl range\par  HGB 11.2\par NIOX  11 ppb 4/22/21\par \par PFT with body box August 12, 2020\par Normal flow rates\par Mild obstructive pattern with an FEV1 FVC 74\par No bronchodilator response at FEV1\par 20% response at small airways\par Normal lung volumes are\par Noted increased % predicted.\par Specific inductance and resistance normal\par Diffusion normal 95% predicted.\par Hemoglobin 13.79\par \par Pulmonary 6-minute walk step stress test August 12, 2020\par Total steps 1/10/2017\par Baseline room air O2 saturation 96%\par Desaturation at minute 6  89% with immediate recovery to 96%\par Impression minimal O2 desaturation\par No indication for portable oxygen therapy\par \par Chest x-ray PA lateral August 12, 2020\par Normal cardiac size\par Scarring left lower lung zone cannot exclude component of bronchiectasis\par Right lung is otherwise clear\par No evidence for jodi adenopathy\par No dominant pulmonary nodules or pneumothorax\par Impression bronchiectatic change left lower lung zone\par \par Blood Draw\par Persistent mild reduction IgA normal range 70\par Data review\par Alpha-1 antitrypsin titer negative\par 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

## 2022-06-15 NOTE — HISTORY OF PRESENT ILLNESS
[Former] : former [TextBox_4] :  Just received RESMED CPAP device- better focus\par Did  not tolerate Oral appliance\par  chest  congestion more  acute\par cough  with pale yellow mucous\par Nocturnal symptoms cough leading to wheeze\par No purulent sputum reports clear to white\par No hemoptysis\par No fevers chills or sweats\par Has completed 2 courses of steroids 2 courses of antibiotics with persistence of the cough\par \par MAEGAN issue MC coverage with oral appliance with f/u Sleep  dentist Dr Mckeon \par \par post Fall months  ago and noted rib fx Right\par  Otherwise states doing very well with Wixela\par  No inc use KARLOS\par \par 70-year-old female \par Complicated pulmonary history to be reviewed\par There is a diagnosis of asthma and she reports bronchiectasis with multiple histories of pneumonia at least 4-5 reported last very ill November December January with an additional illness in March\par She reports abnormal chest CTs and chest x-rays\par Last chest CT she reports was approximately 2018 where she was told of having scarlike tissue\par With these episodes she describes cough chest tightness wheezing sputum production\par Medications through the winter included Y UL P DARION in the nebulizer, Brovana right axilla sodium chloride nebulizer nasal Astelin nasal Flonase albuterol HFA course of prednisone and antibiotics including doxycycline x2 courses and Zithromax.\par \par She states 1 to 1-1/2 weeks ago she developed some trouble breathing self started treatment with albuterol\par She did not receive any antibiotics or steroids with this symptomatology most recently noted\par She does not report at present any sputum production for the past several days and it is clear without hemoptysis purulence or foul smelling\par She also states that she was sick in March there was some question whether she could have COVID-19 but a COVID  19 PCR and antibody testing subsequently was negative\par Other significant history includes a bronchoscopy dating back to April 2019 which was negative including AFB fungus mold and  negative TBLBx.\par Last chest x-ray available for review dates back to April 16, 2019 which was status post a transbronchial biopsy.\par Thickening of the minor fissure which was noted and reported chronic\par Reticulonodular opacities right midlung\par Ill-defined focal opacity periphery left upper to mid lung with some central extension reported to the left hilum with out pneumothorax pleural effusion or significant volume loss.\par \par Additional history\par Right breast DCIS treated radiation therapy 2017\par Childhood polio\par Mild mitral regurgitation\par Hypothyroidism\par Nocturnal myoclonus\par \par Vaccination profile flu vaccinations up-to-date\par 2018 2019 up-to-date with Prevnar and PCV 23\par \par  [TextBox_11] : 1 [YearQuit] : 1981

## 2022-06-15 NOTE — DISCUSSION/SUMMARY
[FreeTextEntry1] : Hx COVID infection Dec 2021\par Mild MAEGAN  AHI 7 with excessive daytime sleepiness 4/29-30/2021\par Asthma\par Abnormal CXR CT CHEST\par Bronchiectasis-\par IgA deficiency\par Hx as  noted\par hypothyroid\par Childhood polio\par Mild MR\par 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\par noted just below normal IGA and low IgG 2 but demonstrates AB to strep  and tetanus\par \par Recommendations\par 6 min walk- JUly-RS\par NIOX\par TX\par restart Wixela 250-50 mcg 1 puff BID and Rinse- only using QD sec leg  cramps plus voice issue-HOLD\par  BREZTRI 2 puffs BID \par   singulair with pm dinner- HA change Accolate\par  prn Albuterol\par  NACL in NEB only BID\par no indication Antibx\par Issue immuno serology IGA and IgG  with subtypes A1AT titer HP panel and possible autoimmune serology- reviewed\par To consider VEST therapy if Bronchiectasis confirmed  with CT CHEST but requests to hold\par Pneumonia vaccines up to  date post 67\par Oral  Appliance- then  restudy and make decision re CPAP- not active use f/u Dr Mckeon\par COVID booster recommended PFIZER\par Look into Acapella device using device with improvement- Aerobilki\par  Mucinex \par Allergy Sulfa PCN Levaquin\par DOXY GI \par Allergy f/u

## 2022-07-20 DIAGNOSIS — Z01.818 ENCOUNTER FOR OTHER PREPROCEDURAL EXAMINATION: ICD-10-CM

## 2022-07-23 LAB — SARS-COV-2 N GENE NPH QL NAA+PROBE: NOT DETECTED

## 2022-07-26 ENCOUNTER — NON-APPOINTMENT (OUTPATIENT)
Age: 72
End: 2022-07-26

## 2022-07-26 ENCOUNTER — OUTPATIENT (OUTPATIENT)
Dept: OUTPATIENT SERVICES | Facility: HOSPITAL | Age: 72
LOS: 1 days | Discharge: HOME | End: 2022-07-26

## 2022-07-26 ENCOUNTER — APPOINTMENT (OUTPATIENT)
Dept: GASTROENTEROLOGY | Facility: HOSPITAL | Age: 72
End: 2022-07-26

## 2022-07-26 VITALS
RESPIRATION RATE: 13 BRPM | DIASTOLIC BLOOD PRESSURE: 78 MMHG | OXYGEN SATURATION: 100 % | SYSTOLIC BLOOD PRESSURE: 126 MMHG | HEART RATE: 70 BPM

## 2022-07-26 VITALS
WEIGHT: 128.09 LBS | SYSTOLIC BLOOD PRESSURE: 140 MMHG | RESPIRATION RATE: 16 BRPM | HEIGHT: 65 IN | HEART RATE: 63 BPM | TEMPERATURE: 98 F | OXYGEN SATURATION: 98 % | DIASTOLIC BLOOD PRESSURE: 84 MMHG

## 2022-07-26 DIAGNOSIS — Z12.11 ENCOUNTER FOR SCREENING FOR MALIGNANT NEOPLASM OF COLON: ICD-10-CM

## 2022-07-26 DIAGNOSIS — Z90.49 ACQUIRED ABSENCE OF OTHER SPECIFIED PARTS OF DIGESTIVE TRACT: Chronic | ICD-10-CM

## 2022-07-26 PROCEDURE — 45378 DIAGNOSTIC COLONOSCOPY: CPT

## 2022-07-26 NOTE — ASU PATIENT PROFILE, ADULT - NSICDXPASTMEDICALHX_GEN_ALL_CORE_FT
PAST MEDICAL HISTORY:  Angiomyolipoma of Kidney    Arrhythmia     Cholelithiasis     Eye disorder clogged eye duct of left eye    h/o Fibroid Uterus     h/o herniated disc Lumbar     h/o Ventricular Tachycardia 2002     Hypothyroidism hashimoto's thyroiditis    Malignant neoplasm of upper-outer quadrant of breast right -> radiation therapy    Mild asthma, unspecified whether complicated, unspecified whether persistent only with cold symptoms    Other nonspecific abnormal finding of lung field     Pneumonia 2012, 2014, 2017 & 2018    Thyroid Nodule

## 2022-07-26 NOTE — ASU PATIENT PROFILE, ADULT - CAREGIVER PHONE NUMBER
MA called and informed patient of test results. Patient states understanding. No further questions at the time.    [phone]

## 2022-07-26 NOTE — ASU PATIENT PROFILE, ADULT - FALL HARM RISK - UNIVERSAL INTERVENTIONS
Bed in lowest position, wheels locked, appropriate side rails in place/Call bell, personal items and telephone in reach/Instruct patient to call for assistance before getting out of bed or chair/Non-slip footwear when patient is out of bed/[place] to call system/Physically safe environment - no spills, clutter or unnecessary equipment/Purposeful Proactive Rounding/Room/bathroom lighting operational, light cord in reach

## 2022-07-26 NOTE — ASU PATIENT PROFILE, ADULT - NSICDXPASTSURGICALHX_GEN_ALL_CORE_FT
PAST SURGICAL HISTORY:  h/o hysterectomy      H/O:  x2     History of cholecystectomy 7 years ago

## 2022-08-03 ENCOUNTER — APPOINTMENT (OUTPATIENT)
Dept: PULMONOLOGY | Facility: CLINIC | Age: 72
End: 2022-08-03

## 2022-08-03 VITALS
OXYGEN SATURATION: 96 % | HEART RATE: 75 BPM | DIASTOLIC BLOOD PRESSURE: 74 MMHG | BODY MASS INDEX: 21.49 KG/M2 | SYSTOLIC BLOOD PRESSURE: 111 MMHG | HEIGHT: 65 IN | WEIGHT: 129 LBS | TEMPERATURE: 208.76 F

## 2022-08-03 PROCEDURE — 95012 NITRIC OXIDE EXP GAS DETER: CPT

## 2022-08-03 PROCEDURE — 94618 PULMONARY STRESS TESTING: CPT

## 2022-08-03 PROCEDURE — 99214 OFFICE O/P EST MOD 30 MIN: CPT | Mod: 25

## 2022-08-03 PROCEDURE — 94010 BREATHING CAPACITY TEST: CPT

## 2022-08-03 RX ORDER — PREDNISONE 10 MG/1
10 TABLET ORAL
Qty: 30 | Refills: 1 | Status: DISCONTINUED | COMMUNITY
Start: 2021-12-13 | End: 2022-08-03

## 2022-08-03 RX ORDER — DOXYCYCLINE HYCLATE 100 MG/1
100 TABLET ORAL
Qty: 14 | Refills: 0 | Status: DISCONTINUED | COMMUNITY
Start: 2022-04-27 | End: 2022-08-03

## 2022-08-03 RX ORDER — METHYLPREDNISOLONE 4 MG/1
4 TABLET ORAL
Qty: 1 | Refills: 0 | Status: DISCONTINUED | COMMUNITY
Start: 2022-04-27 | End: 2022-08-03

## 2022-08-03 RX ORDER — DOXYCYCLINE HYCLATE 100 MG/1
100 TABLET ORAL
Qty: 14 | Refills: 0 | Status: DISCONTINUED | COMMUNITY
Start: 2021-12-13 | End: 2022-08-03

## 2022-08-03 NOTE — DISCUSSION/SUMMARY
[FreeTextEntry1] : Hx COVID infection Dec 2021\par Mild MAEGAN  AHI 7 with excessive daytime sleepiness 4/29-30/2021\par Asthma\par Abnormal CXR CT CHEST\par Bronchiectasis-\par IgA deficiency\par Hx as  noted\par hypothyroid\par Childhood polio\par Mild MR\par 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\par noted just below normal IGA and low IgG 2 but demonstrates AB to strep  and tetanus\par \par Recommendations\par 6 min walk- JUly-RS\par NIOX\par TX\par restart Wixela 250-50 mcg 1 puff BID and Rinse- only using QD sec leg  cramps plus voice issue-HOLD\par  BREZTRI 2 puffs BID \par   singulair with pm dinner- HA change Accolate\par  prn Albuterol\par  NACL in NEB only BID\par no indication Antibx\par Issue immuno serology IGA and IgG  with subtypes A1AT titer HP panel and possible autoimmune serology- reviewed\par To consider VEST therapy if Bronchiectasis confirmed  with CT CHEST but requests to hold\par Pneumonia vaccines up to  date post 67\par Oral  Appliance- then  restudy and make decision re CPAP- not active use f/u Dr Mckeon\par COVID booster recommended PFIZER\par Look into Acapella device using device with improvement- Aerobilki- active use\par  Mucinex \par Allergy Sulfa PCN Levaquin\par Allergy f/u\par Patient compliant with CPAP therapy.  Continue present settings.  Patient with demonstrated clinical benefit with daytime and nocturnal symptomatology improvement.\par \par ENT  Dr Kodak Tadeo

## 2022-08-03 NOTE — PROCEDURE
[FreeTextEntry1] :  Spirometry no bronchodilator August 3, 2022\par Flow rates normal\par FEV1 57% predicted\par Ratio 84\par Overall interval improvement of flow rates compared to Sakshi 15, 2022\par NIOX 9 PPD normal range no evidence of active bronchial inflammation August 2, 2022\par \par Pulmonary 6-minute walk exercise study August 3, 2022\par Baseline room O2 saturation 96%\par Positive for minute desaturation 87% with at 5 minutes back up to 90% on room air with good recovery 94 to 96%\par Impression no evident evidence for indication for portable oxygen therapy protocol\par \par PFT 6/15/22\par Flow rates nl\par  Lung Volumes nl\par DLCO 83 % \par HGB 11.3\par \par CPAP data compliance\par Usage through June 14, 2022\par Usage 100%\par Hours greater than 4\par AutoSet CPAP 6 to 16 cm H2O\par AHI 1.2\par \par Chest x-ray PA lateral April 27, 2022\par Normal cardiac size\par Positive for scar right midlung zone\par No pleural effusions pneumothorax\par Bronchiectatic change right lower lung zone\par No evidence for parenchymal consolidation\par No interval change compared to chest x-ray October 25, 2021\par \par PFT 3/17/22\par Flow rates nl\par  Lung Volumes nl\par TLC 97 %\par  DLCO 89 %\par HGB 12.1\par \par Chest x-ray PA lateral October 25, 2021\par Cardiac size is normal\par Lower lumbar sacral scoliosis\par Bronchiectatic changes right lower lung zone\par No parenchymal consolidation pleural effusions pneumothorax\par Mediastinum hilum unremarkable\par IMP no evidence for pneumonia\par \par PFT 10/6/21\par Flow rates nl\par TLC 89 %\par  DLCO  87 %\par HGB 15.0\par \par Sleep study\par April 29–April 30, 2021\par Overall mild obstructive sleep apnea\par AHI 7\par Time spent less than 90% on room air 4.3%\par Mean O2 saturation 94.5%\par Recommendation address treatment protocol with auto CPAP\par PFT 7/28/21\par Flow Rtaes nl\par Lung Volumes nl\par TTLC 95 %\par DLCO 91 % nl\par HGB 15.0\par PFT 4/22/21\par Flow  rates nl\par Lung Volumes Nl\par DLCO 96 % nl range\par  HGB 11.2\par NIOX  11 ppb 4/22/21\par \par PFT with body box August 12, 2020\par Normal flow rates\par Mild obstructive pattern with an FEV1 FVC 74\par No bronchodilator response at FEV1\par 20% response at small airways\par Normal lung volumes are\par Noted increased % predicted.\par Specific inductance and resistance normal\par Diffusion normal 95% predicted.\par Hemoglobin 13.79\par \par Pulmonary 6-minute walk step stress test August 12, 2020\par Total steps 1/10/2017\par Baseline room air O2 saturation 96%\par Desaturation at minute 6  89% with immediate recovery to 96%\par Impression minimal O2 desaturation\par No indication for portable oxygen therapy\par \par Chest x-ray PA lateral August 12, 2020\par Normal cardiac size\par Scarring left lower lung zone cannot exclude component of bronchiectasis\par Right lung is otherwise clear\par No evidence for jodi adenopathy\par No dominant pulmonary nodules or pneumothorax\par Impression bronchiectatic change left lower lung zone\par \par Blood Draw\par Persistent mild reduction IgA normal range 70\par Data review\par Alpha-1 antitrypsin titer negative\par 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

## 2022-08-03 NOTE — HISTORY OF PRESENT ILLNESS
[Former] : former [TextBox_4] :  Just received RESMED CPAP device- better focus\par Did  not tolerate Oral appliance\par  chest  congestion more  acute\par cough  with pale yellow mucous\par Nocturnal symptoms cough leading to wheeze\par No purulent sputum reports clear to white- thick mucous\par No hemoptysis\par No fevers chills or sweats\par Has completed 2 courses of steroids 2 courses of antibiotics with persistence of the cough\par \par MAEGAN issue MC coverage with oral appliance with f/u Sleep  dentist Dr Mckeon \par \par post Fall months  ago and noted rib fx Right\par  Otherwise states doing very well with Wixela\par  No inc use KARLOS\par \par 70-year-old female \par Complicated pulmonary history to be reviewed\par There is a diagnosis of asthma and she reports bronchiectasis with multiple histories of pneumonia at least 4-5 reported last very ill November December January with an additional illness in March\par She reports abnormal chest CTs and chest x-rays\par Last chest CT she reports was approximately 2018 where she was told of having scarlike tissue\par With these episodes she describes cough chest tightness wheezing sputum production\par Medications through the winter included Y UL P DARION in the nebulizer, Brovana right axilla sodium chloride nebulizer nasal Astelin nasal Flonase albuterol HFA course of prednisone and antibiotics including doxycycline x2 courses and Zithromax.\par \par She states 1 to 1-1/2 weeks ago she developed some trouble breathing self started treatment with albuterol\par She did not receive any antibiotics or steroids with this symptomatology most recently noted\par She does not report at present any sputum production for the past several days and it is clear without hemoptysis purulence or foul smelling\par She also states that she was sick in March there was some question whether she could have COVID-19 but a COVID  19 PCR and antibody testing subsequently was negative\par Other significant history includes a bronchoscopy dating back to April 2019 which was negative including AFB fungus mold and  negative TBLBx.\par Last chest x-ray available for review dates back to April 16, 2019 which was status post a transbronchial biopsy.\par Thickening of the minor fissure which was noted and reported chronic\par Reticulonodular opacities right midlung\par Ill-defined focal opacity periphery left upper to mid lung with some central extension reported to the left hilum with out pneumothorax pleural effusion or significant volume loss.\par \par Additional history\par Right breast DCIS treated radiation therapy 2017\par Childhood polio\par Mild mitral regurgitation\par Hypothyroidism\par Nocturnal myoclonus\par \par Vaccination profile flu vaccinations up-to-date\par 2018 2019 up-to-date with Prevnar and PCV 23\par \par  [TextBox_11] : 1 [YearQuit] : 1981

## 2022-09-14 ENCOUNTER — APPOINTMENT (OUTPATIENT)
Dept: PEDIATRIC ALLERGY IMMUNOLOGY | Facility: CLINIC | Age: 72
End: 2022-09-14

## 2022-09-14 ENCOUNTER — LABORATORY RESULT (OUTPATIENT)
Age: 72
End: 2022-09-14

## 2022-09-14 VITALS
TEMPERATURE: 97.3 F | OXYGEN SATURATION: 98 % | HEART RATE: 74 BPM | DIASTOLIC BLOOD PRESSURE: 80 MMHG | SYSTOLIC BLOOD PRESSURE: 134 MMHG

## 2022-09-14 DIAGNOSIS — J31.0 CHRONIC RHINITIS: ICD-10-CM

## 2022-09-14 PROCEDURE — 36415 COLL VENOUS BLD VENIPUNCTURE: CPT | Mod: GC

## 2022-09-14 PROCEDURE — 99204 OFFICE O/P NEW MOD 45 MIN: CPT | Mod: 25

## 2022-09-14 PROCEDURE — 95004 PERQ TESTS W/ALRGNC XTRCS: CPT | Mod: GC

## 2022-09-14 RX ORDER — MOMETASONE FUROATE 1 MG/G
0.1 OINTMENT TOPICAL
Qty: 45 | Refills: 0 | Status: DISCONTINUED | COMMUNITY
Start: 2021-09-23 | End: 2022-09-14

## 2022-09-14 RX ORDER — FLUTICASONE PROPIONATE AND SALMETEROL 250; 50 UG/1; UG/1
250-50 POWDER RESPIRATORY (INHALATION)
Qty: 3 | Refills: 1 | Status: DISCONTINUED | COMMUNITY
Start: 2020-08-05 | End: 2022-09-14

## 2022-09-14 RX ORDER — ZAFIRLUKAST 20 MG/1
20 TABLET, COATED ORAL
Qty: 60 | Refills: 3 | Status: DISCONTINUED | COMMUNITY
Start: 2021-11-15 | End: 2022-09-14

## 2022-09-14 RX ORDER — PEG-3350, SODIUM SULFATE, SODIUM CHLORIDE, POTASSIUM CHLORIDE, SODIUM ASCORBATE AND ASCORBIC ACID 7.5-2.691G
100 KIT ORAL
Qty: 1 | Refills: 0 | Status: DISCONTINUED | COMMUNITY
Start: 2022-05-11 | End: 2022-09-14

## 2022-09-14 NOTE — CONSULT LETTER
[Dear  ___] : Dear  [unfilled], [Consult Letter:] : I had the pleasure of evaluating your patient, [unfilled]. [Please see my note below.] : Please see my note below. [This report is provisional, pending the completion of the evaluation.  A final diagnosis and plan will follow.] : This report is provisional, pending the completion of the evaluation.  A final diagnosis and plan will follow. [Consult Closing:] : Thank you very much for allowing me to participate in the care of this patient.  If you have any questions, please do not hesitate to contact me. [Sincerely,] : Sincerely, [DrSunny  ___] : Dr. STONE [FreeTextEntry3] : Nikki Kimura, MD\par Fellow, Division of Allergy and Immunology\par [facility] \par [facility]\par \par MD ELVIA Morris FACAAI\par Adult and Pediatric Allergy, Asthma and Clinical Immunology\par  of Medicine and Pediatrics at\par   [facility]\par Section Head, Adult Allergy and Immunology\par   [facility]\par   Division of Allergy, Asthma and Immunology\par   [address], [address]\par   [place], [place] [address]\par   Phone [phone]  Fax [phone]\par \par

## 2022-09-14 NOTE — REASON FOR VISIT
[Initial Consultation] : an initial consultation for [Allergy Evaluation/ Skin Testing] : allergy evaluation and or skin testing [To Medication] : allergy to medication

## 2022-09-14 NOTE — HISTORY OF PRESENT ILLNESS
[Allergic Rhinitis] : allergic rhinitis [Eczematous rashes] : eczematous rashes [Food Allergies] : food allergies [de-identified] : 72 year old female with PMH of DCIS s/p radiation, bronchiectasis, Hashimoto's, recurrent infections, multiple antibiotic allergies. Previously saw Dr. Boxer about 10 years ago. \par \par DRUG ALLERGY \par Gentamicin, vancomycin: Years ago was at [facility] getting hysterectomy and was given abx prophylactically, both at the same time. Developed hives right after the first dose, not sure which one caused it. No angioedema. No respiratory symptoms. Avoids. \par \par Codeine: 15-20 years ago got codeine for pain after hysteroscopy, developed hives right after the first or second dose. Avoids.\par \par Levaquin: had previously tolerated in PO form but when she was hospitalized at [facility] for PNA she received levaquin. Reports that her whole body turned red and she felt hot, she is unsure if this occurred immediately after or the day after. No hives or swelling. Is unsure if this was true allergy. \par \par Penicillin: as a child she states she received a penicillin injection does not know what indication was. The next day the skin "sunk in." No redness or itchiness. Has been avoiding it since then. Cannot remember additional details. \par \par Has a paper with her from Dr. Boxer 10 years ago that states she can take clinda, tetracyline, cephalosporins. She has written down cipro, doxy, keflex, macrobid, macrobid. She states she "doesn't know if she can take these anymore" since it's been many years. \par \par IV contrast is listed on allergy tab however she cannot recall the circumstances and whether or not she had reaction. Same for sulfa allergies. \par \par She denies any issues with hives other than as mentioned above. \par \par INFECTION HISTORY\par Reports that she "never gets just a cold." Always turns into bronchitis or PNA. Follows Dr. Stein. \par States that as a child she would get sick frequently, would always get abx. Never had unusual infections. States she received chest irradiation in Feb 2018. Notes that she had PNA back in 2015 even before radiation. Hospitalized for it and received IV Levaquin. Total of 5 PNA's since that time radiographically confirmed. IGG subsets in 2020 showed elevated IgG1, but low IGG2 and IGG3. No miscarriages. \par \par FHx: colon cancer in mom, prostate ca in brother, younger sister had endometrial cancer.

## 2022-09-14 NOTE — SOCIAL HISTORY
[House] : [unfilled] lives in a house  [None] : none [de-identified] : smoked a pack a day for 15 years, quit in 1981.  [FreeTextEntry2] : retired, used to work at dentist office  [Smokers in Household] : there are no smokers in the home [de-identified] : area rug

## 2022-09-14 NOTE — PHYSICAL EXAM
[Alert] : alert [Well Nourished] : well nourished [Healthy Appearance] : healthy appearance [No Acute Distress] : no acute distress [Well Developed] : well developed [Normal Pupil & Iris Size/Symmetry] : normal pupil and iris size and symmetry [No Discharge] : no discharge [No Photophobia] : no photophobia [Sclera Not Icteric] : sclera not icteric [Normal TMs] : both tympanic membranes were normal [Normal Lips/Tongue] : the lips and tongue were normal [Normal Outer Ear/Nose] : the ears and nose were normal in appearance [Normal Tonsils] : normal tonsils [No Thrush] : no thrush [Supple] : the neck was supple [Normal Rate and Effort] : normal respiratory rhythm and effort [No Crackles] : no crackles [No Retractions] : no retractions [Bilateral Audible Breath Sounds] : bilateral audible breath sounds [Normal Rate] : heart rate was normal  [Normal S1, S2] : normal S1 and S2 [No murmur] : no murmur [Regular Rhythm] : with a regular rhythm [Soft] : abdomen soft [Not Tender] : non-tender [Not Distended] : not distended [No HSM] : no hepato-splenomegaly [Normal Cervical Lymph Nodes] : cervical [Skin Intact] : skin intact  [No Rash] : no rash [No Skin Lesions] : no skin lesions [No clubbing] : no clubbing [No Edema] : no edema [No Cyanosis] : no cyanosis [Normal Mood] : mood was normal [Normal Affect] : affect was normal [Alert, Awake, Oriented as Age-Appropriate] : alert, awake, oriented as age appropriate [Boggy Nasal Turbinates] : boggy and/or pale nasal turbinates [Pale mucosa] : no pale mucosa

## 2022-09-14 NOTE — END OF VISIT
[] : Fellow [FreeTextEntry3] : Multiple drug reactions; will schedule testing:\par = skin testing to Vancomycin, Gentamicin, Levaquin, Cipro\par = Amoxicillin challenge 1 day, 550 mg\par \par Recurrent pneumonias, r/o underlying immunodeficiency\par  \par Mixed rhinitis and postnasal drip:\par - continue saline, has nasal fluticasone at home. Proper technique of using nasal spray reviewed. \par  - will add Azelastine nasal spray after testing is complete\par  [Time Spent: ___ minutes] : I have spent [unfilled] minutes of time on the encounter. [>50% of the face to face encounter time was spent on counseling and/or coordination of care for ___] : Greater than 50% of the face to face encounter time was spent on counseling and/or coordination of care for [unfilled]

## 2022-09-14 NOTE — REVIEW OF SYSTEMS
[Nl] : Genitourinary [Immunizations are up to date] : Immunizations are up to date [Difficulty Swallowing] : no dysphagia [FreeTextEntry6] : +chronic cough  [FreeTextEntry7] : f

## 2022-09-14 NOTE — IMPRESSION
[Allergy Testing Mixed Feathers] : feathers [Allergy Testing Weeds] : weeds [Allergy Testing Grasses] : grasses [Allergy Testing Dust Mite] : dust mites [Allergy Testing Cockroach] : cockroach [Allergy Testing Dog] : dog [Allergy Testing Cat] : cat [] : molds [Allergy Testing Trees] : trees [FreeTextEntry2] : Not dermographic

## 2022-09-16 ENCOUNTER — NON-APPOINTMENT (OUTPATIENT)
Age: 72
End: 2022-09-16

## 2022-09-22 ENCOUNTER — NON-APPOINTMENT (OUTPATIENT)
Age: 72
End: 2022-09-22

## 2022-09-22 ENCOUNTER — APPOINTMENT (OUTPATIENT)
Dept: PULMONOLOGY | Facility: CLINIC | Age: 72
End: 2022-09-22

## 2022-09-22 VITALS — DIASTOLIC BLOOD PRESSURE: 71 MMHG | HEART RATE: 80 BPM | OXYGEN SATURATION: 98 % | SYSTOLIC BLOOD PRESSURE: 113 MMHG

## 2022-09-22 DIAGNOSIS — R09.82 POSTNASAL DRIP: ICD-10-CM

## 2022-09-22 LAB
BASOPHILS # BLD AUTO: 0.04 K/UL
BASOPHILS NFR BLD AUTO: 0.5 %
C DIPHTHERIAE AB SER QL: <0.1 IU/ML
C TETANI IGG SER-ACNC: 0.54 IU/ML
CD16+CD56+ CELLS # BLD: 151 /UL
CD16+CD56+ CELLS NFR BLD: 8 %
CD19 CELLS NFR BLD: 134 /UL
CD3 CELLS # BLD: 1665 /UL
CD3 CELLS NFR BLD: 85 %
CD3+CD4+ CELLS # BLD: 925 /UL
CD3+CD4+ CELLS NFR BLD: 49 %
CD3+CD4+ CELLS/CD3+CD8+ CLL SPEC: 1.36 RATIO
CD3+CD8+ CELLS # SPEC: 681 /UL
CD3+CD8+ CELLS NFR BLD: 36 %
CELLS.CD3-CD19+/CELLS IN BLOOD: 7 %
CH50 SERPL-MCNC: 40 U/ML
COMPLEMENT, ALTERNATE PATHWAY (AH50): 78
DEPRECATED KAPPA LC FREE/LAMBDA SER: 2.02 RATIO
DEPRECATED S PNEUM 1 IGG SER-MCNC: 3.6 MCG/ML
DEPRECATED S PNEUM12 AB SER-ACNC: <0.4 MCG/ML
DEPRECATED S PNEUM14 AB SER-ACNC: 0.8 MCG/ML
DEPRECATED S PNEUM17 IGG SER IA-MCNC: 0.4 MCG/ML
DEPRECATED S PNEUM18 IGG SER IA-MCNC: <0.4 MCG/ML
DEPRECATED S PNEUM19 IGG SER-MCNC: 6.2 MCG/ML
DEPRECATED S PNEUM19 IGG SER-MCNC: 7.1 MCG/ML
DEPRECATED S PNEUM2 IGG SER-MCNC: 0.7 MCG/ML
DEPRECATED S PNEUM20 IGG SER-MCNC: <0.4 MCG/ML
DEPRECATED S PNEUM22 IGG SER-MCNC: 8.4 MCG/ML
DEPRECATED S PNEUM23 AB SER-ACNC: 11.9 MCG/ML
DEPRECATED S PNEUM3 AB SER-ACNC: <0.4 MCG/ML
DEPRECATED S PNEUM34 IGG SER-MCNC: 0.9 MCG/ML
DEPRECATED S PNEUM4 AB SER-ACNC: <0.4 MCG/ML
DEPRECATED S PNEUM5 IGG SER-MCNC: 2.8 MCG/ML
DEPRECATED S PNEUM6 IGG SER-MCNC: 0.5 MCG/ML
DEPRECATED S PNEUM7 IGG SER-ACNC: 2 MCG/ML
DEPRECATED S PNEUM8 AB SER-ACNC: 1.6 MCG/ML
DEPRECATED S PNEUM9 AB SER-ACNC: NORMAL MCG/ML
DEPRECATED S PNEUM9 IGG SER-MCNC: 1 MCG/ML
EOSINOPHIL # BLD AUTO: 0.17 K/UL
EOSINOPHIL NFR BLD AUTO: 2.1 %
HAEM INFLU B AB SER-MCNC: <0.15 UG/ML
HCT VFR BLD CALC: 40.5 %
HGB BLD-MCNC: 12.9 G/DL
IGA SER QL IEP: 82 MG/DL
IGG SER QL IEP: 1464 MG/DL
IGM SER QL IEP: 38 MG/DL
IMM GRANULOCYTES NFR BLD AUTO: 0.1 %
KAPPA LC CSF-MCNC: 1.23 MG/DL
KAPPA LC SERPL-MCNC: 2.48 MG/DL
LPT PW BLD-NRATE: NORMAL
LYMPHOCYTES # BLD AUTO: 2.15 K/UL
LYMPHOCYTES NFR BLD AUTO: 26.6 %
MAN DIFF?: NORMAL
MANNAN BINDING LECTIN (MBL): 83 NG/ML
MCHC RBC-ENTMCNC: 31.5 PG
MCHC RBC-ENTMCNC: 31.9 GM/DL
MCV RBC AUTO: 99 FL
MONOCYTES # BLD AUTO: 0.47 K/UL
MONOCYTES NFR BLD AUTO: 5.8 %
MYELOPEROXIDASE COMMENT: NORMAL
MYELOPEROXIDASE SPEC: POSITIVE
NEUTROPHILS # BLD AUTO: 5.24 K/UL
NEUTROPHILS NFR BLD AUTO: 64.9 %
PLATELET # BLD AUTO: 329 K/UL
RBC # BLD: 4.09 M/UL
RBC # FLD: 14.6 %
STREPTOCOCCUS PNEUMONIAE SEROTYPE 11A: 0.5 MCG/ML
STREPTOCOCCUS PNEUMONIAE SEROTYPE 15B: 1.3 MCG/ML
STREPTOCOCCUS PNEUMONIAE SEROTYPE 33F: 0.8 MCG/ML
WBC # FLD AUTO: 8.08 K/UL

## 2022-09-22 PROCEDURE — 90662 IIV NO PRSV INCREASED AG IM: CPT

## 2022-09-22 PROCEDURE — 99214 OFFICE O/P EST MOD 30 MIN: CPT | Mod: 25

## 2022-09-22 PROCEDURE — G0008: CPT

## 2022-09-22 NOTE — PROCEDURE
[FreeTextEntry1] :  Spirometry no bronchodilator August 3, 2022\par Flow rates normal\par FEV1 57% predicted\par Ratio 84\par Overall interval improvement of flow rates compared to Sakshi 15, 2022\par NIOX 9 PPD normal range no evidence of active bronchial inflammation August 2, 2022\par \par Pulmonary 6-minute walk exercise study August 3, 2022\par Baseline room O2 saturation 96%\par Positive for minute desaturation 87% with at 5 minutes back up to 90% on room air with good recovery 94 to 96%\par Impression no evident evidence for indication for portable oxygen therapy protocol\par \par PFT 6/15/22\par Flow rates nl\par  Lung Volumes nl\par DLCO 83 % \par HGB 11.3\par \par CPAP data compliance\par Usage through June 14, 2022\par Usage 100%\par Hours greater than 4\par AutoSet CPAP 6 to 16 cm H2O\par AHI 1.2\par \par Chest x-ray PA lateral April 27, 2022\par Normal cardiac size\par Positive for scar right midlung zone\par No pleural effusions pneumothorax\par Bronchiectatic change right lower lung zone\par No evidence for parenchymal consolidation\par No interval change compared to chest x-ray October 25, 2021\par \par PFT 3/17/22\par Flow rates nl\par  Lung Volumes nl\par TLC 97 %\par  DLCO 89 %\par HGB 12.1\par \par Chest x-ray PA lateral October 25, 2021\par Cardiac size is normal\par Lower lumbar sacral scoliosis\par Bronchiectatic changes right lower lung zone\par No parenchymal consolidation pleural effusions pneumothorax\par Mediastinum hilum unremarkable\par IMP no evidence for pneumonia\par \par PFT 10/6/21\par Flow rates nl\par TLC 89 %\par  DLCO  87 %\par HGB 15.0\par \par Sleep study\par April 29–April 30, 2021\par Overall mild obstructive sleep apnea\par AHI 7\par Time spent less than 90% on room air 4.3%\par Mean O2 saturation 94.5%\par Recommendation address treatment protocol with auto CPAP\par PFT 7/28/21\par Flow Rtaes nl\par Lung Volumes nl\par TTLC 95 %\par DLCO 91 % nl\par HGB 15.0\par PFT 4/22/21\par Flow  rates nl\par Lung Volumes Nl\par DLCO 96 % nl range\par  HGB 11.2\par NIOX  11 ppb 4/22/21\par \par PFT with body box August 12, 2020\par Normal flow rates\par Mild obstructive pattern with an FEV1 FVC 74\par No bronchodilator response at FEV1\par 20% response at small airways\par Normal lung volumes are\par Noted increased % predicted.\par Specific inductance and resistance normal\par Diffusion normal 95% predicted.\par Hemoglobin 13.79\par \par Pulmonary 6-minute walk step stress test August 12, 2020\par Total steps 1/10/2017\par Baseline room air O2 saturation 96%\par Desaturation at minute 6  89% with immediate recovery to 96%\par Impression minimal O2 desaturation\par No indication for portable oxygen therapy\par \par Chest x-ray PA lateral August 12, 2020\par Normal cardiac size\par Scarring left lower lung zone cannot exclude component of bronchiectasis\par Right lung is otherwise clear\par No evidence for jodi adenopathy\par No dominant pulmonary nodules or pneumothorax\par Impression bronchiectatic change left lower lung zone\par \par Blood Draw\par Persistent mild reduction IgA normal range 70\par Data review\par Alpha-1 antitrypsin titer negative\par 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  Apical a mutation 1 copy cystic fibrosis consistent with being an unaffected CF carrier\par  mold profile negative\par Hypersensitivity pneumonitis panel negative\par Immunoglobulin studies normal range including IgA IgG IgM\par Serum IgE level 9 normal range\par \par HD flu vaccine 9/22/22`

## 2022-09-22 NOTE — HISTORY OF PRESENT ILLNESS
[Former] : former [TextBox_4] : AI recommended HEME\par 72 year old female with DCIS s/p radiation and lumpectomy, Hashimoto thyroiditis, bronchiectasis, recurrent PNAs, reported allergies to multiple antibiotics presents for drug allergy evaluation. \par  skin testing to Vancomycin, Gentamicin, Levaquin, Cipro\par REPORTED REACTION TO GENTAMICIN, VANCOMYCIN \par There is a concern for RED MAN SYNDROME with IV Vancomycin. Vancomycin is a known trigger for mast cell degranulation via direct stimulation of mast cells and/or basophils, which may be due to bypass of the antibody-mediated pathway and activation of a mast cell-specific receptor called Mas-related G protein-coupled receptor X2 (MRGPRX2). We will pursue additional evaluation to further investigate whether or not she has true IgE-mediated hypersensitivity. \par REPORTED REACTION TO PENICILLIN\par Her remote history of skin "sinking in" after a penicillin shot is highly doubtful of true penicillin hypersensitivity. Would recommend amoxicillin challenge. \par Patient will schedule an appointment for office graded challenge to Amoxicillin. If patient tolerates the 1st dose of Amoxicillin in the office without adverse effect, he/she will complete 3 days of treatment and we will observe for delayed reactions. If there is no delayed reaction observed, we will remove penicillin from drug allergy list. \par We discussed that skin testing is predictive only for immediate, IgE-mediated allergic reactions. Patient's history is not consistent with immediate type of hypersensitivity. People with negative skin test, still have a chance of developing delayed hypersensitivity reaction. Currently, there are no scientifically validated tests for delayed reactions available. We discussed that many patients labeled with penicillin allergy are, in fact, not allergic to penicillin. Diagnosis of penicillin allergy leads to use of alternative, broader spectrum antibiotics and was demonstrated to cause higher rate of complications and morbidity.Patient was instructed to hold antihistamines, including topical intranasal and ocular antihistamine preparations, for at least 5 days before the testing.\par REPORTED REACTION TO IV LEVAQUIN, unclear reaction to PO CIPROFLOXACIN\par Can pursue additional testing to levaquin given concern for hypersensitivity.\par Ciprofloxacin (another fluoroquinolone) is a known trigger for mast cell degranulation via the mechanism mentioned above. Pt states she would like to pursue challenge for cipro since she is unsure if this antibiotic is problematic.\par REPORTED REACTION TO CODEINE\par Codeine is a known trigger for mast cell degranulation via mechanism mentioned above. Will hold off on further testing and recommend avoidance. \par ?ALLERGY TO IV CONTRAST \par Pt cannot recall whether she reacted to IV contrast in the past. We asked that she obtain records to determine if additional testing is warranted. Of note however there is an ongoing national shortage of IV contrast and therefore testing is prohibited at this time. If the patient is unable to avoid the use of IV contrast then she should premedicate. \par RECURRENT PNEUMONIAS, SCREENING FOR IMMUNE DEFICIENCY; history of radiation therapy for DCIS:\par Given the history of RECURRENT INFECTIONS, it is reasonable to interrogate cellular, humoral, and complement arms of immunity. Laboratory testing was sent as stated below and results will be discussed when available. She states she received pneumonia vaccines although not sure which one, also received TDAP within the past 10 years. We will check her antibody responses to those vaccines.\par MIXED RHINITIS\par SPT showed positivity to feathers, grass, weeds which is inconsistent with her occasional symptoms throughout the year. \par Topical intranasal treatments were offered for associated POST NASAL DRIP. She states she will consider these treatments when antibiotic testing is done so as not to interfere with results. States she will use nasal saline if needed for now which helps. \par \par  Just received RESMED CPAP device- better focus\par Did  not tolerate Oral appliance\par  chest  congestion more  acute\par cough  with pale yellow mucous\par Nocturnal symptoms cough leading to wheeze\par No purulent sputum reports clear to white- thick mucous\par No hemoptysis\par No fevers chills or sweats\par Has completed 2 courses of steroids 2 courses of antibiotics with persistence of the cough\par \par MAEGAN issue MC coverage with oral appliance with f/u Sleep  dentist Dr Mckeon \par \par post Fall months  ago and noted rib fx Right\par  Otherwise states doing very well with Wixela\par  No inc use KARLOS\par \par 70-year-old female \par Complicated pulmonary history to be reviewed\par There is a diagnosis of asthma and she reports bronchiectasis with multiple histories of pneumonia at least 4-5 reported last very ill November December January with an additional illness in March\par She reports abnormal chest CTs and chest x-rays\par Last chest CT she reports was approximately 2018 where she was told of having scarlike tissue\par With these episodes she describes cough chest tightness wheezing sputum production\par Medications through the winter included Y UL P DARION in the nebulizer, Robertoalexy right axilla sodium chloride nebulizer nasal Astelin nasal Flonase albuterol HFA course of prednisone and antibiotics including doxycycline x2 courses and Zithromax.\par \par She states 1 to 1-1/2 weeks ago she developed some trouble breathing self started treatment with albuterol\par She did not receive any antibiotics or steroids with this symptomatology most recently noted\par She does not report at present any sputum production for the past several days and it is clear without hemoptysis purulence or foul smelling\par She also states that she was sick in March there was some question whether she could have COVID-19 but a COVID  19 PCR and antibody testing subsequently was negative\par Other significant history includes a bronchoscopy dating back to April 2019 which was negative including AFB fungus mold and  negative TBLBx.\par Last chest x-ray available for review dates back to April 16, 2019 which was status post a transbronchial biopsy.\par Thickening of the minor fissure which was noted and reported chronic\par Reticulonodular opacities right midlung\par Ill-defined focal opacity periphery left upper to mid lung with some central extension reported to the left hilum with out pneumothorax pleural effusion or significant volume loss.\par \par Additional history\par Right breast DCIS treated radiation therapy 2017\par Childhood polio\par Mild mitral regurgitation\par Hypothyroidism\par Nocturnal myoclonus\par \par Vaccination profile flu vaccinations up-to-date\par 2018 2019 up-to-date with Prevnar and PCV 23\par \par  [TextBox_11] : 1 [YearQuit] : 1981

## 2022-09-22 NOTE — DISCUSSION/SUMMARY
[FreeTextEntry1] : Hx COVID infection Dec 2021\par Mild MAEGAN  AHI 7 with excessive daytime sleepiness 4/29-30/2021\par Asthma\par Abnormal CXR CT CHEST\par Bronchiectasis-\par IgA deficiency\par Hx as  noted\par hypothyroid\par Childhood polio\par Mild MR\par 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\par noted just below normal IGA and low IgG 2 but demonstrates AB to strep  and tetanus\par R/O plasma cell dyscrasia\par \par \par Recommendations\par 6 min walk- JUly-RS\par NIOX\par TX\par restart Wixela 250-50 mcg 1 puff BID and Rinse- only using QD sec leg  cramps plus voice issue-HOLD\par  BREZTRI 2 puffs BID \par   singulair with pm dinner- HA change Accolate\par  prn Albuterol\par  NACL in NEB only BID\par no indication Antibx\par Issue immuno serology IGA and IgG  with subtypes A1AT titer HP panel and possible autoimmune serology- reviewed\par To consider VEST therapy if Bronchiectasis confirmed  with CT CHEST but requests to hold\par Pneumonia vaccines up to  date post 65\par Oral  Appliance- then  restudy and make decision re CPAP- not active use f/u Dr Mckeon\par COVID booster recommended PFIZER\par Look into Acapella device using device with improvement- Aerobilki- active use\par  Mucinex \par Allergy Sulfa PCN Levaquin\par Allergy f/u\par Patient compliant with CPAP therapy.  Continue present settings.  Patient with demonstrated clinical benefit with daytime and nocturnal symptomatology improvement.\par \par ENT  Dr Kodak Tadeo\par recommend Heme per AI

## 2022-09-26 ENCOUNTER — APPOINTMENT (OUTPATIENT)
Dept: PULMONOLOGY | Facility: CLINIC | Age: 72
End: 2022-09-26

## 2022-10-03 ENCOUNTER — APPOINTMENT (OUTPATIENT)
Dept: PEDIATRIC ALLERGY IMMUNOLOGY | Facility: CLINIC | Age: 72
End: 2022-10-03

## 2022-10-03 PROCEDURE — 90471 IMMUNIZATION ADMIN: CPT

## 2022-10-03 PROCEDURE — 90648 HIB PRP-T VACCINE 4 DOSE IM: CPT

## 2022-10-06 ENCOUNTER — OUTPATIENT (OUTPATIENT)
Dept: OUTPATIENT SERVICES | Facility: HOSPITAL | Age: 72
LOS: 1 days | Discharge: HOME | End: 2022-10-06

## 2022-10-06 DIAGNOSIS — Z90.49 ACQUIRED ABSENCE OF OTHER SPECIFIED PARTS OF DIGESTIVE TRACT: Chronic | ICD-10-CM

## 2022-10-06 DIAGNOSIS — D47.2 MONOCLONAL GAMMOPATHY: ICD-10-CM

## 2022-10-12 ENCOUNTER — APPOINTMENT (OUTPATIENT)
Dept: HEMATOLOGY ONCOLOGY | Facility: CLINIC | Age: 72
End: 2022-10-12

## 2022-10-12 ENCOUNTER — RESULT REVIEW (OUTPATIENT)
Age: 72
End: 2022-10-12

## 2022-10-12 ENCOUNTER — NON-APPOINTMENT (OUTPATIENT)
Age: 72
End: 2022-10-12

## 2022-10-12 VITALS
WEIGHT: 134.48 LBS | DIASTOLIC BLOOD PRESSURE: 89 MMHG | SYSTOLIC BLOOD PRESSURE: 149 MMHG | TEMPERATURE: 98.3 F | OXYGEN SATURATION: 96 % | HEART RATE: 74 BPM | RESPIRATION RATE: 16 BRPM | BODY MASS INDEX: 22.96 KG/M2 | HEIGHT: 63.98 IN

## 2022-10-12 LAB
ALBUMIN SERPL ELPH-MCNC: 4.3 G/DL
ALP BLD-CCNC: 70 U/L
ALT SERPL-CCNC: 22 U/L
ANION GAP SERPL CALC-SCNC: 9 MMOL/L
AST SERPL-CCNC: 25 U/L
BASOPHILS # BLD AUTO: 0.04 K/UL — SIGNIFICANT CHANGE UP (ref 0–0.2)
BASOPHILS NFR BLD AUTO: 0.6 % — SIGNIFICANT CHANGE UP (ref 0–2)
BILIRUB SERPL-MCNC: 0.8 MG/DL
BUN SERPL-MCNC: 18 MG/DL
CALCIUM SERPL-MCNC: 9.4 MG/DL
CHLORIDE SERPL-SCNC: 101 MMOL/L
CO2 SERPL-SCNC: 31 MMOL/L
CREAT SERPL-MCNC: 0.88 MG/DL
EGFR: 71 ML/MIN/1.73M2
EOSINOPHIL # BLD AUTO: 0.2 K/UL — SIGNIFICANT CHANGE UP (ref 0–0.5)
EOSINOPHIL NFR BLD AUTO: 2.8 % — SIGNIFICANT CHANGE UP (ref 0–6)
GLUCOSE SERPL-MCNC: 75 MG/DL
HCT VFR BLD CALC: 39.7 % — SIGNIFICANT CHANGE UP (ref 34.5–45)
HGB BLD-MCNC: 13.3 G/DL — SIGNIFICANT CHANGE UP (ref 11.5–15.5)
IMM GRANULOCYTES NFR BLD AUTO: 0.3 % — SIGNIFICANT CHANGE UP (ref 0–0.9)
LYMPHOCYTES # BLD AUTO: 2.27 K/UL — SIGNIFICANT CHANGE UP (ref 1–3.3)
LYMPHOCYTES # BLD AUTO: 31.9 % — SIGNIFICANT CHANGE UP (ref 13–44)
MCHC RBC-ENTMCNC: 32.4 PG — SIGNIFICANT CHANGE UP (ref 27–34)
MCHC RBC-ENTMCNC: 33.5 G/DL — SIGNIFICANT CHANGE UP (ref 32–36)
MCV RBC AUTO: 96.6 FL — SIGNIFICANT CHANGE UP (ref 80–100)
MONOCYTES # BLD AUTO: 0.49 K/UL — SIGNIFICANT CHANGE UP (ref 0–0.9)
MONOCYTES NFR BLD AUTO: 6.9 % — SIGNIFICANT CHANGE UP (ref 2–14)
NEUTROPHILS # BLD AUTO: 4.09 K/UL — SIGNIFICANT CHANGE UP (ref 1.8–7.4)
NEUTROPHILS NFR BLD AUTO: 57.5 % — SIGNIFICANT CHANGE UP (ref 43–77)
NRBC # BLD: 0 /100 WBCS — SIGNIFICANT CHANGE UP (ref 0–0)
PLATELET # BLD AUTO: 366 K/UL — SIGNIFICANT CHANGE UP (ref 150–400)
POTASSIUM SERPL-SCNC: 4.3 MMOL/L
PROT SERPL-MCNC: 7.1 G/DL
RBC # BLD: 4.11 M/UL — SIGNIFICANT CHANGE UP (ref 3.8–5.2)
RBC # FLD: 13.3 % — SIGNIFICANT CHANGE UP (ref 10.3–14.5)
SODIUM SERPL-SCNC: 141 MMOL/L
WBC # BLD: 7.11 K/UL — SIGNIFICANT CHANGE UP (ref 3.8–10.5)
WBC # FLD AUTO: 7.11 K/UL — SIGNIFICANT CHANGE UP (ref 3.8–10.5)

## 2022-10-12 PROCEDURE — 99205 OFFICE O/P NEW HI 60 MIN: CPT

## 2022-10-13 NOTE — ASSESSMENT
[Palliative Care Plan] : not applicable at this time [FreeTextEntry1] : 71 yo F with PMHx significant for 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\par \par To be noted, lab in 2004 showing Cr 0.9; Serum Kappa 398; lambda 70. lab in 7/2022 showed normal blood counts, Cr 0.97. Lab in 9/2022 showed IgG 1464; IgA 82; Kappa FLC 2.48; Lambda FLC 1.23; K/L ratio 2.02. \par \par plan\par -Discussed with pt about lab results; increased monoclonal light chain level has been stable since 2004. \par -continue to monitor MGUS symptoms and lab. Explained for pt that the risk of MGUS progresses to MM on average is about 1% each year according to data from [facility].\par -RTC in one year\par \par \par Chichi Tovar PGY5 \par Hem & Onc fellow

## 2022-10-13 NOTE — HISTORY OF PRESENT ILLNESS
[Date: ____________] : Patient's last distress assessment performed on [unfilled]. [0 - No Distress] : Distress Level: 0 [100: Normal, no complaints, no evidence of disease.] : 100: Normal, no complaints, no evidence of disease. [ECOG Performance Status: 0 - Fully active, able to carry on all pre-disease performance without restriction] : Performance Status: 0 - Fully active, able to carry on all pre-disease performance without restriction [de-identified] : 73 yo F with PMHx significant for stage 0 right DCIS s/p resection and radiation, bronchiectasis developed after radiation, Hashimoto's, recurrent infections, multiple antibiotic allergies(including gentamicin, vancomycin, codeine, penicillin, levaquin), MGUS (initially found increased monoclonal IgG 1360 in 2004 at [facility]) presents here for initial consultation about MGUS. She denied fever, chills, night sweats, N/V/D, weight loss, changes with urination and BMs. \par \par To be noted, lab in 2004 showing Cr 0.9; Serum Kappa elevation; lambda 70. Normal IgG level lab in 7/2022 showed normal blood counts, Cr 0.97. \par Lab in 9/2022 showed IgG 1464; IgA 82; Kappa FLC 2.48; Lambda FLC 1.23; K/L ratio 2.02. \par There has been no evidence of renal disease or cardiac disease. 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 She lives with her . \par \par Family history of colon cancer reported. Previously had removal of a large approximate 20 mm sessile serrated polyp as well as colonic adenoma. Recent colonoscopy 7/26/2022 Normal terminal ileum; marked sigmoid diverticulosis noted with muscular hypertrophy, luminal narrowing and fixation. Hemorrhoids detected.\par

## 2022-10-13 NOTE — RESULTS/DATA
[FreeTextEntry1] : 10/12/2022 WBC 7.100 HGB 13.6  000 normal differential\par Total protein 7.1 g/dL kappa free light chain 2.83 normal 1.9\par CMP normal BUN creatine electrolytes and liver function studies

## 2022-10-13 NOTE — END OF VISIT
[] : Fellow [Time Spent: ___ minutes] : I have spent [unfilled] minutes of time on the encounter. [FreeTextEntry3] : Patient has history of multiple allergies and minimal lowering of serum IgA level. Now undergoing allergy testing; Prior stable elevation in kappa protein since 2004. Seen with Dr FELICIA Tovar MD PhD

## 2022-10-13 NOTE — REVIEW OF SYSTEMS
[Joint Pain] : joint pain [Negative] : Allergic/Immunologic [FreeTextEntry9] : fingers; reports positive rheumatoid factor

## 2022-10-17 ENCOUNTER — APPOINTMENT (OUTPATIENT)
Dept: PEDIATRIC ALLERGY IMMUNOLOGY | Facility: CLINIC | Age: 72
End: 2022-10-17

## 2022-10-17 VITALS — WEIGHT: 137.79 LBS | BODY MASS INDEX: 23.67 KG/M2 | HEART RATE: 78 BPM

## 2022-10-17 PROCEDURE — 95076 INGEST CHALLENGE INI 120 MIN: CPT

## 2022-10-17 PROCEDURE — 36415 COLL VENOUS BLD VENIPUNCTURE: CPT

## 2022-10-18 LAB
ALBUMIN MFR SERPL ELPH: 58 %
ALBUMIN SERPL-MCNC: 4.1 G/DL
ALBUMIN/GLOB SERPL: 1.4 RATIO
ALPHA1 GLOB MFR SERPL ELPH: 3.8 %
ALPHA1 GLOB SERPL ELPH-MCNC: 0.3 G/DL
ALPHA2 GLOB MFR SERPL ELPH: 11.1 %
ALPHA2 GLOB SERPL ELPH-MCNC: 0.8 G/DL
B-GLOBULIN MFR SERPL ELPH: 8.8 %
B-GLOBULIN SERPL ELPH-MCNC: 0.6 G/DL
DEPRECATED KAPPA LC FREE/LAMBDA SER: 2.21 RATIO
GAMMA GLOB FLD ELPH-MCNC: 1.3 G/DL
GAMMA GLOB MFR SERPL ELPH: 18.3 %
IGA SER QL IEP: 83 MG/DL
IGG SER QL IEP: 1435 MG/DL
IGM SER QL IEP: 38 MG/DL
INTERPRETATION SERPL IEP-IMP: NORMAL
KAPPA LC CSF-MCNC: 1.28 MG/DL
KAPPA LC SERPL-MCNC: 2.83 MG/DL
M PROTEIN MFR SERPL ELPH: 14.7 %
M PROTEIN SPEC IFE-MCNC: NORMAL
MONOCLON BAND OBS SERPL: 1 G/DL
PROT SERPL-MCNC: 7.1 G/DL
PROT SERPL-MCNC: 7.1 G/DL

## 2022-10-21 LAB
CH50 SERPL-MCNC: 97 U/ML
CHRONIC URTICARIA PANEL (CU INDEX): 2.9

## 2022-10-21 NOTE — PHYSICAL EXAM
[Alert] : alert [Well Nourished] : well nourished [Healthy Appearance] : healthy appearance [No Acute Distress] : no acute distress [Normal Voice/Communication] : normal voice communication [Sclera Not Icteric] : sclera not icteric [Conjunctival Erythema] : no conjunctival erythema [No Thrush] : no thrush [Pharyngeal erythema] : no pharyngeal erythema [Exudate] : no exudate [No Neck Mass] : no neck mass was observed [Normal Rate and Effort] : normal respiratory rhythm and effort [No Crackles] : no crackles [Bilateral Audible Breath Sounds] : bilateral audible breath sounds [Wheezing] : no wheezing was heard [Normal Rate] : heart rate was normal  [Regular Rhythm] : with a regular rhythm [No Rash] : no rash [Urticaria] : no urticaria [No clubbing] : no clubbing [No Cyanosis] : no cyanosis

## 2022-10-21 NOTE — HISTORY OF PRESENT ILLNESS
[Consent obtained and signed form scanned in to chart] : Consent obtained and signed form scanned in to chart [Diphenhydramine] : Diphenhydramine, 1-2mg/kg IM (max dose 50mg), (50mg/1 cc) [Epinephrine 1:1000 IM] : Epinephrine 1:1000 IM, 0.01cc/kg (max dose 0.5 cc) [Albuterol MDI] : Albuterol MDI, 2 - 4 puffs [Albuterol nebulized] : Albuterol nebulized, 0.083% [Clear] : Skin Findings: Clear [No] : Reaction: No [_______] : Time: [unfilled] [___] : RR: [unfilled]  [____] : IVB: [unfilled] [___% 1) Skin -  A) Erythematous rash - % area involved] : Erythematous Rash (IA): [unfilled] % area involved [1 Pruritus: 1 - mild-occasional scratching] : Pruritus (IB): 1 -  mild [1 Urticaria/Angioedema: 1 - mild < 3 hives] : Urticaria/Angioedema (IC): 1 - mild [1 1 mild- few areas of faint erythema] : Rash (ID): 1 - mild [___] : Time: [unfilled] [0 Pruritus: 0  - absent] : Pruritus (IB): 0 - absent [0 Urticaria/Angioedema: 0 - Absent] : Urticaria/Angioedema (IC): 0  - Absent [0 Rash: 0 - Absent] : Rash (ID): 0 - Absent [0 Sneezing/Itchin - Absent] : Sneezing/Itching (IIA): 0 - Absent [0 Nasal congestion: 0 - Absent] : Nasal congestion (IIB): 0 - Absent [0 Laryngeal: 0 - Absent] : Laryngeal (IID): 0 - Absent [0 Rhinorrhea: 0 - Absent] : Rhinorrhea (IIC): 0 - Absent [0 Wheezin - Absent] : Wheezing (IIIA): 0 - Absent [0 Gastro-Subjective complaints: 0 - Absent] : Gastro-Subjective Complaints (SCAR): 0 - Absent [0 Gastro-Objective complaints: 0 - Absent] : Gastro-Objective Complaints (IVB): 0 - Absent [Antihistamine use in past 5 days] : No antihistamine use in past 5 days [Recent Illness] : no recent illness [Fever] : no fever [Asthma] : no asthma [Asthma well controlled?] : asthma well controlled: no [de-identified] : 72 year old female with PMH of DCIS s/p radiation, bronchiectasis, Hashimoto's, recurrent infections, multiple antibiotic allergies. Previously saw Dr. Boxer about 10 years ago. Here for Amoxicillin. \par \par Penicillin: as a child she states she received a penicillin injection does not know what indication was. The next day the skin "sunk in." No redness or itchiness. Has been avoiding it since then. Cannot remember additional details. \par \par  [FreeTextEntry1] : Amoxicillin  [FreeTextEntry2] : 6230 [FreeTextEntry5] : +pruritus on arms [FreeTextEntry6] : +erythematous rash on her neck with one hive.   [FreeTextEntry7] : Rash mildly improved without medication [FreeTextEntry8] :  No rash.

## 2022-10-24 ENCOUNTER — APPOINTMENT (OUTPATIENT)
Dept: PULMONOLOGY | Facility: CLINIC | Age: 72
End: 2022-10-24

## 2022-10-24 VITALS — OXYGEN SATURATION: 94 % | HEART RATE: 71 BPM | SYSTOLIC BLOOD PRESSURE: 110 MMHG | DIASTOLIC BLOOD PRESSURE: 70 MMHG

## 2022-10-24 PROCEDURE — 94729 DIFFUSING CAPACITY: CPT

## 2022-10-24 PROCEDURE — 90677 PCV20 VACCINE IM: CPT

## 2022-10-24 PROCEDURE — 94727 GAS DIL/WSHOT DETER LNG VOL: CPT

## 2022-10-24 PROCEDURE — 94010 BREATHING CAPACITY TEST: CPT

## 2022-10-24 PROCEDURE — G0009: CPT

## 2022-10-24 PROCEDURE — 99214 OFFICE O/P EST MOD 30 MIN: CPT | Mod: 25

## 2022-10-24 NOTE — PROCEDURE
[FreeTextEntry1] :  PFT 10/24/22\par flow rates nl\par  Lungs Volumes nl\par DLCo 86 %\par HGB 13.3\par overall stable pulmonary physiology\par NIOX  9  ppb 10/24/22 nl range\par \par Spirometry no bronchodilator August 3, 2022\par Flow rates normal\par FEV1 57% predicted\par Ratio 84\par Overall interval improvement of flow rates compared to Sakshi 15, 2022\par NIOX 9 PPD normal range no evidence of active bronchial inflammation August 2, 2022\par \par Pulmonary 6-minute walk exercise study August 3, 2022\par Baseline room O2 saturation 96%\par Positive for minute desaturation 87% with at 5 minutes back up to 90% on room air with good recovery 94 to 96%\par Impression no evident evidence for indication for portable oxygen therapy protocol\par \par PFT 6/15/22\par Flow rates nl\par  Lung Volumes nl\par DLCO 83 % \par HGB 11.3\par \par CPAP data compliance\par Usage through June 14, 2022\par Usage 100%\par Hours greater than 4\par AutoSet CPAP 6 to 16 cm H2O\par AHI 1.2\par \par Chest x-ray PA lateral April 27, 2022\par Normal cardiac size\par Positive for scar right midlung zone\par No pleural effusions pneumothorax\par Bronchiectatic change right lower lung zone\par No evidence for parenchymal consolidation\par No interval change compared to chest x-ray October 25, 2021\par \par PFT 3/17/22\par Flow rates nl\par  Lung Volumes nl\par TLC 97 %\par  DLCO 89 %\par HGB 12.1\par \par Chest x-ray PA lateral October 25, 2021\par Cardiac size is normal\par Lower lumbar sacral scoliosis\par Bronchiectatic changes right lower lung zone\par No parenchymal consolidation pleural effusions pneumothorax\par Mediastinum hilum unremarkable\par IMP no evidence for pneumonia\par \par PFT 10/6/21\par Flow rates nl\par TLC 89 %\par  DLCO  87 %\par HGB 15.0\par \par Sleep study\par April 29–April 30, 2021\par Overall mild obstructive sleep apnea\par AHI 7\par Time spent less than 90% on room air 4.3%\par Mean O2 saturation 94.5%\par Recommendation address treatment protocol with auto CPAP\par PFT 7/28/21\par Flow Rtaes nl\par Lung Volumes nl\par TTLC 95 %\par DLCO 91 % nl\par HGB 15.0\par PFT 4/22/21\par Flow  rates nl\par Lung Volumes Nl\par DLCO 96 % nl range\par  HGB 11.2\par NIOX  11 ppb 4/22/21\par \par PFT with body box August 12, 2020\par Normal flow rates\par Mild obstructive pattern with an FEV1 FVC 74\par No bronchodilator response at FEV1\par 20% response at small airways\par Normal lung volumes are\par Noted increased % predicted.\par Specific inductance and resistance normal\par Diffusion normal 95% predicted.\par Hemoglobin 13.79\par \par Pulmonary 6-minute walk step stress test August 12, 2020\par Total steps 1/10/2017\par Baseline room air O2 saturation 96%\par Desaturation at minute 6  89% with immediate recovery to 96%\par Impression minimal O2 desaturation\par No indication for portable oxygen therapy\par \par Chest x-ray PA lateral August 12, 2020\par Normal cardiac size\par Scarring left lower lung zone cannot exclude component of bronchiectasis\par Right lung is otherwise clear\par No evidence for jodi adenopathy\par No dominant pulmonary nodules or pneumothorax\par Impression bronchiectatic change left lower lung zone\par \par Blood Draw\par Persistent mild reduction IgA normal range 70\par Data review\par Alpha-1 antitrypsin titer negative\par 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

## 2022-10-24 NOTE — DISCUSSION/SUMMARY
[FreeTextEntry1] : Hx COVID infection Dec 2021\par Mild MAEGAN  AHI 7 with excessive daytime sleepiness 4/29-30/2021\par Asthma\par Abnormal CXR CT CHEST\par Bronchiectasis-\par IgA deficiency\par Hx as  noted\par hypothyroid\par Childhood polio\par Mild MR\par 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\par noted just below normal IGA and low IgG 2 but demonstrates AB to strep  and tetanus\par R/O plasma cell dyscrasia\par \par \par Recommendations\par 6 min walk- JUly-RS\par NIOX\par TX\par restart Wixela 250-50 mcg 1 puff BID and Rinse- only using QD sec leg  cramps plus voice issue-HOLD\par  BREZTRI 2 puffs BID \par   singulair with pm dinner- HA change Accolate\par  prn Albuterol\par  NACL in NEB only BID\par no indication Antibx\par Issue immuno serology IGA and IgG  with subtypes A1AT titer HP panel and possible autoimmune serology- reviewed\par To consider VEST therapy if Bronchiectasis confirmed  with CT CHEST but requests to hold\par Pneumonia vaccines up to  date post 65\par Oral  Appliance- then  restudy and make decision re CPAP- not active use f/u Dr Mckeon\par COVID booster recommended PFIZER\par Look into Acapella device using device with improvement- Aerobilki- active use\par  Mucinex \par Allergy Sulfa PCN Levaquin\par Allergy f/u\par Patient compliant with CPAP therapy.  Continue present settings.  Patient with demonstrated clinical benefit with daytime and nocturnal symptomatology improvement.\par \par ENT  Dr Kodak Tadeo\par

## 2022-10-24 NOTE — HISTORY OF PRESENT ILLNESS
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 lab in 7/2022 showed normal blood counts, Cr 0.97. Lab in 9/2022 showed IgG 1464; IgA 82; Kappa FLC 2.48; Lambda FLC 1.23; K/L ratio 2.02. \par plan\par -Discussed with pt about lab results; increased monoclonal light chain level has been stable since 2004. \par -continue to monitor MGUS symptoms and lab. Explained for pt that the risk of MGUS progresses to MM on average is about 1% each year according to data from 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 meningitidis IgG, Vaccine; Status:Active; Requested for:17Oct2022; \par Total Hemolytic Complement; Status:Resulted - Requires Verification;   Done: 17Oct2022\par 03:44PM\par \par Discussion/Summary\par 72 year old female with PMH of DCIS s/p radiation, bronchiectasis, Hashimoto's, recurrent infections, multiple antibiotic, chronic urticaria,  allergies here for amoxicillin challenge. \par AMOXICILLIN CHALLENGE:\par Today the patient, took 1050 mg of Amoxicillin in THREE  divided doses  in the office and she was observed for 1 hour. The challenge was started with 50 mg of Amoxicillin, then the dose was increased to 500 mg. Approximated, 45 minutes later she complained of mild pruritus on right upper extremity. 10 minutes later, she developed  erythematous rash with one hive on her neck. Vitals and Breath sounds are wnl. The rash self resolved in forty minutes, the dose was escalated to another 500mg, which was tolerated without any issues. The patient,  admitted to developing random hives without any triggers. We believer, her recent rash with amoxicillin most likely is associated with history of recurrent urticaria. \par Patient developed one hive after Amoxicillin 500 mg, therefore was given an additional dose of Amoxicillin without new hives and initial hive resolved. Therefore, hives are likely due to underlying urticaria.\par -We will remove amoxicillin allergy from the patient's chart. Advised to use Amoxicillin or Penicillin in future with long acting antihistamine, such as Loratidine.\par Penicillin removed from the allergy list. \par Low IgA and \par 72 year old female with DCIS s/p radiation and lumpectomy, Hashimoto thyroiditis, bronchiectasis, recurrent PNAs, reported allergies to multiple antibiotics presents for drug allergy evaluation. \par  skin testing to Vancomycin, Gentamicin, Levaquin, Cipro\par REPORTED REACTION TO GENTAMICIN, VANCOMYCIN \par There is a concern for RED MAN SYNDROME with IV Vancomycin. Vancomycin is a known trigger for mast cell degranulation via direct stimulation of mast cells and/or basophils, which may be due to bypass of the antibody-mediated pathway and activation of a mast cell-specific receptor called Mas-related G protein-coupled receptor X2 (MRGPRX2). We will pursue additional evaluation to further investigate whether or not she has true IgE-mediated hypersensitivity. \par REPORTED REACTION TO PENICILLIN\par Her remote history of skin "sinking in" after a penicillin shot is highly doubtful of true penicillin hypersensitivity. Would recommend amoxicillin challenge. \par Patient will schedule an appointment for office graded challenge to Amoxicillin. If patient tolerates the 1st dose of Amoxicillin in the office without adverse effect, he/she will complete 3 days of treatment and we will observe for delayed reactions. If there is no delayed reaction observed, we will remove penicillin from drug allergy list. \par We discussed that skin testing is predictive only for immediate, IgE-mediated allergic reactions. Patient's history is not consistent with immediate type of hypersensitivity. People with negative skin test, still have a chance of developing delayed hypersensitivity reaction. Currently, there are no scientifically validated tests for delayed reactions available. We discussed that many patients labeled with penicillin allergy are, in fact, not allergic to penicillin. Diagnosis of penicillin allergy leads to use of alternative, broader spectrum antibiotics and was demonstrated to cause higher rate of complications and morbidity.Patient was instructed to hold antihistamines, including topical intranasal and ocular antihistamine preparations, for at least 5 days before the testing.\par REPORTED REACTION TO IV LEVAQUIN, unclear reaction to PO CIPROFLOXACIN\par Can pursue additional testing to levaquin given concern for hypersensitivity.\par Ciprofloxacin (another fluoroquinolone) is a known trigger for mast cell degranulation via the mechanism mentioned above. Pt states she would like to pursue challenge for cipro since she is unsure if this antibiotic is problematic.\par REPORTED REACTION TO CODEINE\par Codeine is a known trigger for mast cell degranulation via mechanism mentioned above. Will hold off on further testing and recommend avoidance. \par ?ALLERGY TO IV CONTRAST \par Pt cannot recall whether she reacted to IV contrast in the past. We asked that she obtain records to determine if additional testing is warranted. Of note however there is an ongoing national shortage of IV contrast and therefore testing is prohibited at this time. If the patient is unable to avoid the use of IV contrast then she should premedicate. \par RECURRENT PNEUMONIAS, SCREENING FOR IMMUNE DEFICIENCY; history of radiation therapy for DCIS:\par Given the history of RECURRENT INFECTIONS, it is reasonable to interrogate cellular, humoral, and complement arms of immunity. Laboratory testing was sent as stated below and results will be discussed when available. She states she received pneumonia vaccines although not sure which one, also received TDAP within the past 10 years. We will check her antibody responses to those vaccines.\par MIXED RHINITIS\par SPT showed positivity to feathers, grass, weeds which is inconsistent with her occasional symptoms throughout the year. \par Topical intranasal treatments were offered for associated POST NASAL DRIP. She states she will consider these treatments when antibiotic testing is done so as not to interfere with results. States she will use nasal saline if needed for now which helps. \par \par  Just received RESMED CPAP device- better focus\par Did  not tolerate Oral appliance\par  chest  congestion more  acute\par cough  with pale yellow mucous\par Nocturnal symptoms cough leading to wheeze\par No purulent sputum reports clear to white- thick mucous\par No hemoptysis\par No fevers chills or sweats\par Has completed 2 courses of steroids 2 courses of antibiotics with persistence of the cough\par \par MAEGAN issue MC coverage with oral appliance with f/u Sleep  dentist Dr Mckeon \par \par post Fall months  ago and noted rib fx Right\par  Otherwise states doing very well with Wixela\par  No inc use KARLOS\par \par 70-year-old female \par Complicated pulmonary history to be reviewed\par There is a diagnosis of asthma and she reports bronchiectasis with multiple histories of pneumonia at least 4-5 reported last very ill November December January with an additional illness in March\par She reports abnormal chest CTs and chest x-rays\par Last chest CT she reports was approximately 2018 where she was told of having scarlike tissue\par With these episodes she describes cough chest tightness wheezing sputum production\par Medications through the winter included Y UL P DARION in the nebulizer, Brovana right axilla sodium chloride nebulizer nasal Astelin nasal Flonase albuterol HFA course of prednisone and antibiotics including doxycycline x2 courses and Zithromax.\par \par She states 1 to 1-1/2 weeks ago she developed some trouble breathing self started treatment with albuterol\par She did not receive any antibiotics or steroids with this symptomatology most recently noted\par She does not report at present any sputum production for the past several days and it is clear without hemoptysis purulence or foul smelling\par She also states that she was sick in March there was some question whether she could have COVID-19 but a COVID  19 PCR and antibody testing subsequently was negative\par Other significant history includes a bronchoscopy dating back to April 2019 which was negative including AFB fungus mold and  negative TBLBx.\par Last chest x-ray available for review dates back to April 16, 2019 which was status post a transbronchial biopsy.\par Thickening of the minor fissure which was noted and reported chronic\par Reticulonodular opacities right midlung\par Ill-defined focal opacity periphery left upper to mid lung with some central extension reported to the left hilum with out pneumothorax pleural effusion or significant volume loss.\par \par Additional history\par Right breast DCIS treated radiation therapy 2017\par Childhood polio\par Mild mitral regurgitation\par Hypothyroidism\par Nocturnal myoclonus\par \par Vaccination profile flu vaccinations up-to-date\par 2018 2019 up-to-date with Prevnar and PCV 23\par \par  [TextBox_11] : 1 [YearQuit] : 1981

## 2022-10-27 LAB
MANNAN BINDING LECTIN (MBL): <70 NG/ML
SEROGROUP A: <0.5 UG/ML
SEROGROUP C: <0.5 UG/ML
SEROGROUP W135: <0.5 UG/ML
SEROGROUP Y: <0.5 UG/ML

## 2022-10-31 ENCOUNTER — NON-APPOINTMENT (OUTPATIENT)
Age: 72
End: 2022-10-31

## 2022-11-10 LAB
IGE RECEPTOR AB INTERPRETATION: NORMAL
IGE RECEPTOR AB: 2.3 %

## 2022-11-15 ENCOUNTER — APPOINTMENT (OUTPATIENT)
Dept: PEDIATRIC ALLERGY IMMUNOLOGY | Facility: CLINIC | Age: 72
End: 2022-11-15

## 2022-11-15 VITALS
HEART RATE: 72 BPM | DIASTOLIC BLOOD PRESSURE: 83 MMHG | OXYGEN SATURATION: 95 % | TEMPERATURE: 96.8 F | BODY MASS INDEX: 23.85 KG/M2 | WEIGHT: 137.99 LBS | SYSTOLIC BLOOD PRESSURE: 153 MMHG | HEIGHT: 63.9 IN

## 2022-11-15 PROCEDURE — 95018 ALL TSTG PERQ&IQ DRUGS/BIOL: CPT

## 2022-11-15 PROCEDURE — 99214 OFFICE O/P EST MOD 30 MIN: CPT | Mod: 25

## 2022-11-15 NOTE — HISTORY OF PRESENT ILLNESS
[Allergic Rhinitis] : allergic rhinitis [Eczematous rashes] : eczematous rashes [Food Allergies] : food allergies [de-identified] : 72 year old female with PMH of DCIS s/p radiation, bronchiectasis, Hashimoto's, recurrent infections, MBL deficiency, MGUS,  multiple antibiotic allergies, returns for antibiotic skin testing. \par \par - She traveled to [place]: was bitten by a horsefly, developed cellulitis, just completed Keflex. She fell, concern for a broken rib. \par \par = Initial immune laboratories were SIGNIFICANT for borderline low IgA, IgGkappa band, nonprotective specific antibody titers to diphtheria, HIB and Strep. pneumoniae- +9/22, MBL-83, repeat MBL<70;  CH50-40, repeat normal \par \par \par = negative amoxicillin challenge 10/17/22\par = s/p Prevnar 20 10/24/22 and HIB 10/3/22\par \par \par HISTORY: \par \par DRUG ALLERGY \par Gentamicin, vancomycin: Years ago was at [facility] getting hysterectomy and was given abx prophylactically, both at the same time. Developed hives right after the first dose, not sure which one caused it. No angioedema. No respiratory symptoms. Avoids. \par \par - Codeine: 15-20 years ago got codeine for pain after hysteroscopy, developed hives right after the first or second dose. Avoids.\par \par - Levaquin: had previously tolerated in PO form but when she was hospitalized at [facility] for PNA she received levaquin. Reports that her whole body turned red and she felt hot, she is unsure if this occurred immediately after or the day after. No hives or swelling. Is unsure if this was true allergy. \par \par - Sulfa antibiotic- possibly rash in her 30s, she is not sure\par \par - Penicillin: as a child she states she received a penicillin injection does not know what indication was. The next day the skin "sunk in." No redness or itchiness. Has been avoiding it since then. Cannot remember additional details. \par \par - Has a paper with her from Dr. Boxer 10 years ago that states she can take clinda, tetracyline, cephalosporins. She has written down cipro, doxy, keflex, macrobid, macrobid. She states she "doesn't know if she can take these anymore" since it's been many years. \par \par IV contrast is listed on allergy tab however she cannot recall the circumstances and whether or not she had reaction. Same for sulfa allergies. \par \par She denies any issues with hives other than as mentioned above. \par \par INFECTION HISTORY\par Reports that she "never gets just a cold." Always turns into bronchitis or PNA. Follows Dr. Stein. \par States that as a child she would get sick frequently, would always get abx. Never had unusual infections. States she received chest irradiation in Feb 2018. Notes that she had PNA back in 2015 even before radiation. Hospitalized for it and received IV Levaquin. Total of 5 PNA's since that time radiographically confirmed. IGG subsets in 2020 showed elevated IgG1, but low IGG2 and IGG3. No miscarriages. \par \par FHx: colon cancer in mom, prostate ca in brother, younger sister had endometrial cancer.

## 2022-11-15 NOTE — PHYSICAL EXAM
[Alert] : alert [Well Nourished] : well nourished [Healthy Appearance] : healthy appearance [No Acute Distress] : no acute distress [Sclera Not Icteric] : sclera not icteric [Conjunctival Erythema] : no conjunctival erythema [No Thrush] : no thrush [Pharyngeal erythema] : no pharyngeal erythema [Exudate] : no exudate [No Neck Mass] : no neck mass was observed [Normal Rate and Effort] : normal respiratory rhythm and effort [No Crackles] : no crackles [Bilateral Audible Breath Sounds] : bilateral audible breath sounds [Wheezing] : no wheezing was heard [Normal Rate] : heart rate was normal  [Regular Rhythm] : with a regular rhythm [No Rash] : no rash [Urticaria] : no urticaria [No clubbing] : no clubbing [No Cyanosis] : no cyanosis [Normal Mood] : mood was normal [Normal Affect] : affect was normal [Alert, Awake, Oriented as Age-Appropriate] : alert, awake, oriented as age appropriate

## 2022-11-15 NOTE — REVIEW OF SYSTEMS
[Urticaria] : no urticaria [Atopic Dermatitis] : no atopic dermatitis [Swelling] : no swelling [Nl] : Gastrointestinal

## 2022-11-16 ENCOUNTER — APPOINTMENT (OUTPATIENT)
Dept: UROLOGY | Facility: CLINIC | Age: 72
End: 2022-11-16

## 2022-11-16 ENCOUNTER — RX RENEWAL (OUTPATIENT)
Age: 72
End: 2022-11-16

## 2022-11-16 ENCOUNTER — OUTPATIENT (OUTPATIENT)
Dept: OUTPATIENT SERVICES | Facility: HOSPITAL | Age: 72
LOS: 1 days | End: 2022-11-16
Payer: MEDICARE

## 2022-11-16 DIAGNOSIS — Z90.49 ACQUIRED ABSENCE OF OTHER SPECIFIED PARTS OF DIGESTIVE TRACT: Chronic | ICD-10-CM

## 2022-11-16 DIAGNOSIS — R35.0 FREQUENCY OF MICTURITION: ICD-10-CM

## 2022-11-16 PROCEDURE — 76775 US EXAM ABDO BACK WALL LIM: CPT

## 2022-11-16 PROCEDURE — 99213 OFFICE O/P EST LOW 20 MIN: CPT | Mod: 25

## 2022-11-16 PROCEDURE — 76775 US EXAM ABDO BACK WALL LIM: CPT | Mod: 26

## 2022-11-17 NOTE — ASSESSMENT
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 Left kidney- cysts not visualized this exam. No stones/ hydro. \par \par plan\par 1) RTC in 1 year with Renal US\par Doing well- all remains stable

## 2022-11-17 NOTE — HISTORY OF PRESENT ILLNESS
[Left Flank] : left flank [None] : There is no radiation [Aching] : aching [Gradual] : gradual [___ Week(s) Ago] : [unfilled] week(s) ago [FreeTextEntry1] : 72 yr old female presents to follow with AML. hx of AML dx in 2011 presenting for surveillance sonogram. No flank pain, no hematuria. No fevers or chills, no nausea or vomiting. \par \par Renal US- 1.3 x 1 cm AML. Left kidney- 2 small cysts. No stones/ hydro. \par \par Feeling well at this time.\par no sig new changes to meds or med condition

## 2022-11-17 NOTE — PHYSICAL EXAM
[General Appearance - Well Developed] : well developed [Abdomen Soft] : soft [Abdomen Tenderness] : non-tender [Costovertebral Angle Tenderness] : no ~M costovertebral angle tenderness [Urinary Bladder Findings] : the bladder was normal on palpation [Skin Color & Pigmentation] : normal skin color and pigmentation [Edema] : no peripheral edema [] : no respiratory distress [Oriented To Time, Place, And Person] : oriented to person, place, and time [No Focal Deficits] : no focal deficits [Normal Station and Gait] : the gait and station were normal for the patient's age

## 2022-11-18 ENCOUNTER — APPOINTMENT (OUTPATIENT)
Dept: ULTRASOUND IMAGING | Facility: IMAGING CENTER | Age: 72
End: 2022-11-18

## 2022-11-18 ENCOUNTER — APPOINTMENT (OUTPATIENT)
Dept: MAMMOGRAPHY | Facility: IMAGING CENTER | Age: 72
End: 2022-11-18

## 2022-11-18 ENCOUNTER — OUTPATIENT (OUTPATIENT)
Dept: OUTPATIENT SERVICES | Facility: HOSPITAL | Age: 72
LOS: 1 days | End: 2022-11-18
Payer: MEDICARE

## 2022-11-18 DIAGNOSIS — Z00.8 ENCOUNTER FOR OTHER GENERAL EXAMINATION: ICD-10-CM

## 2022-11-18 DIAGNOSIS — D17.71 BENIGN LIPOMATOUS NEOPLASM OF KIDNEY: ICD-10-CM

## 2022-11-18 DIAGNOSIS — Z90.49 ACQUIRED ABSENCE OF OTHER SPECIFIED PARTS OF DIGESTIVE TRACT: Chronic | ICD-10-CM

## 2022-11-18 PROCEDURE — G0279: CPT | Mod: 26

## 2022-11-18 PROCEDURE — 76641 ULTRASOUND BREAST COMPLETE: CPT | Mod: 26,50

## 2022-11-18 PROCEDURE — 76641 ULTRASOUND BREAST COMPLETE: CPT

## 2022-11-18 PROCEDURE — 77066 DX MAMMO INCL CAD BI: CPT | Mod: 26

## 2022-11-18 PROCEDURE — G0279: CPT

## 2022-11-18 PROCEDURE — 77066 DX MAMMO INCL CAD BI: CPT

## 2022-11-30 ENCOUNTER — APPOINTMENT (OUTPATIENT)
Dept: PULMONOLOGY | Facility: CLINIC | Age: 72
End: 2022-11-30

## 2022-11-30 VITALS
OXYGEN SATURATION: 96 % | TEMPERATURE: 98.6 F | HEART RATE: 80 BPM | DIASTOLIC BLOOD PRESSURE: 69 MMHG | RESPIRATION RATE: 16 BRPM | SYSTOLIC BLOOD PRESSURE: 122 MMHG

## 2022-11-30 DIAGNOSIS — G47.33 OBSTRUCTIVE SLEEP APNEA (ADULT) (PEDIATRIC): ICD-10-CM

## 2022-11-30 PROCEDURE — 99214 OFFICE O/P EST MOD 30 MIN: CPT | Mod: 25

## 2022-11-30 PROCEDURE — 95012 NITRIC OXIDE EXP GAS DETER: CPT

## 2022-11-30 PROCEDURE — 94010 BREATHING CAPACITY TEST: CPT

## 2022-11-30 NOTE — PROCEDURE
[FreeTextEntry1] :  Spirometry November 30, 2022\par Flow rates are within normal limits\par Although significant data of October 24, 2022 there is decline at the flow rates\par NIOx  7  ppb 11/30/22 nl range\par \par PFT 10/24/22\par flow rates nl\par  Lungs Volumes nl\par DLCo 86 %\par HGB 13.3\par overall stable pulmonary physiology\par NIOX  9  ppb 10/24/22 nl range\par \par Spirometry no bronchodilator August 3, 2022\par Flow rates normal\par FEV1 57% predicted\par Ratio 84\par Overall interval improvement of flow rates compared to Sakshi 15, 2022\par NIOX 9 PPD normal range no evidence of active bronchial inflammation August 2, 2022\par \par Pulmonary 6-minute walk exercise study August 3, 2022\par Baseline room O2 saturation 96%\par Positive for minute desaturation 87% with at 5 minutes back up to 90% on room air with good recovery 94 to 96%\par Impression no evident evidence for indication for portable oxygen therapy protocol\par \par PFT 6/15/22\par Flow rates nl\par  Lung Volumes nl\par DLCO 83 % \par HGB 11.3\par \par CPAP data compliance\par Usage through June 14, 2022\par Usage 100%\par Hours greater than 4\par AutoSet CPAP 6 to 16 cm H2O\par AHI 1.2\par \par Chest x-ray PA lateral April 27, 2022\par Normal cardiac size\par Positive for scar right midlung zone\par No pleural effusions pneumothorax\par Bronchiectatic change right lower lung zone\par No evidence for parenchymal consolidation\par No interval change compared to chest x-ray October 25, 2021\par \par PFT 3/17/22\par Flow rates nl\par  Lung Volumes nl\par TLC 97 %\par  DLCO 89 %\par HGB 12.1\par \par Chest x-ray PA lateral October 25, 2021\par Cardiac size is normal\par Lower lumbar sacral scoliosis\par Bronchiectatic changes right lower lung zone\par No parenchymal consolidation pleural effusions pneumothorax\par Mediastinum hilum unremarkable\par IMP no evidence for pneumonia\par \par PFT 10/6/21\par Flow rates nl\par TLC 89 %\par  DLCO  87 %\par HGB 15.0\par \par Sleep study\par April 29–April 30, 2021\par Overall mild obstructive sleep apnea\par AHI 7\par Time spent less than 90% on room air 4.3%\par Mean O2 saturation 94.5%\par Recommendation address treatment protocol with auto CPAP\par PFT 7/28/21\par Flow Rtaes nl\par Lung Volumes nl\par TTLC 95 %\par DLCO 91 % nl\par HGB 15.0\par PFT 4/22/21\par Flow  rates nl\par Lung Volumes Nl\par DLCO 96 % nl range\par  HGB 11.2\par NIOX  11 ppb 4/22/21\par \par PFT with body box August 12, 2020\par Normal flow rates\par Mild obstructive pattern with an FEV1 FVC 74\par No bronchodilator response at FEV1\par 20% response at small airways\par Normal lung volumes are\par Noted increased % predicted.\par Specific inductance and resistance normal\par Diffusion normal 95% predicted.\par Hemoglobin 13.79\par \par Pulmonary 6-minute walk step stress test August 12, 2020\par Total steps 1/10/2017\par Baseline room air O2 saturation 96%\par Desaturation at minute 6  89% with immediate recovery to 96%\par Impression minimal O2 desaturation\par No indication for portable oxygen therapy\par \par Chest x-ray PA lateral August 12, 2020\par Normal cardiac size\par Scarring left lower lung zone cannot exclude component of bronchiectasis\par Right lung is otherwise clear\par No evidence for jodi adenopathy\par No dominant pulmonary nodules or pneumothorax\par Impression bronchiectatic change left lower lung zone\par \par Blood Draw\par Persistent mild reduction IgA normal range 70\par Data review\par Alpha-1 antitrypsin titer negative\par 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

## 2022-11-30 NOTE — HISTORY OF PRESENT ILLNESS
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 lab in 7/2022 showed normal blood counts, Cr 0.97. Lab in 9/2022 showed IgG 1464; IgA 82; Kappa FLC 2.48; Lambda FLC 1.23; K/L ratio 2.02. \par plan\par -Discussed with pt about lab results; increased monoclonal light chain level has been stable since 2004. \par -continue to monitor MGUS symptoms and lab. Explained for pt that the risk of MGUS progresses to MM on average is about 1% each year according to data from [facility].\par -RTC in one year\par \par AI recommended HEME\par \par 72 year old female with DCIS s/p radiation and lumpectomy, Hashimoto thyroiditis, bronchiectasis, recurrent PNAs, reported allergies to multiple antibiotics presents for drug allergy evaluation. \par MBL def.; needs mening vaccines A and B. \par - delabeled from penicillin allergy, can use penicillins, cephalosporins as indicated\par REPORTED REACTION TO GENTAMICIN, VANCOMYCIN \par There is a concern for RED MAN SYNDROME with IV Vancomycin. Vancomycin is a known trigger for mast cell degranulation via direct stimulation of mast cells and/or basophils, which may be due to bypass of the antibody-mediated pathway and activation of a mast cell-specific receptor called Mas-related G protein-coupled receptor X2 (MRGPRX2). \par skin testing was inconclusive to GENTAMICIN, VANCOMYCIN \par - avoid GENTAMICIN, VANCOMYCIN \par = can repeat skin testing in the future\par - Based on history and positive skin testing, avoid levaquin\par - Cipro can be prescribed if indicated. Recommend 1st dose to be administered under physician's observation. can be done in my office. \par SULFONAMIDE ANTIBIOTIC ALLERGY:\par Patient has a remote history of delayed benign rash when taking sulfa antibiotics. Unfortunately, there is no validated test available for sulfonamides.should continue avoiding sulfa antibiotics. \par If/ when administration of sulfonamide is required, physician-supervised administration/graded challenge can be performed. \par REPORTED REACTION TO CODEINE\par Codeine is a known trigger for mast cell degranulation via mechanism mentioned above. \par Recommend avoidance. \par ?ALLERGY TO IV CONTRAST \par Pt cannot recall whether she reacted to IV contrast in the past. We asked that she obtain records to determine if additional testing is warranted. Of note however there is an ongoing national shortage of IV contrast and therefore testing is prohibited at this time. If the patient is unable to avoid the use of IV contrast then she should premedicate. 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 meningitidis IgG, Vaccine; Status:Active; Requested for:[id number]; \par Total Hemolytic Complement; Status:Resulted - Requires Verification;   Done: [id number]\par 03:44PM\par Discussion/Summary\par 72 year old female with PMH of DCIS s/p radiation, bronchiectasis, Hashimoto's, recurrent infections, multiple antibiotic, chronic urticaria,  allergies here for amoxicillin challenge. \par AMOXICILLIN CHALLENGE:\par Today the patient, took 1050 mg of Amoxicillin in THREE  divided doses  in the office and she was observed for 1 hour. The challenge was started with 50 mg of Amoxicillin, then the dose was increased to 500 mg. Approximated, 45 minutes later she complained of mild pruritus on right upper extremity. 10 minutes later, she developed  erythematous rash with one hive on her neck. Vitals and Breath sounds are wnl. The rash self resolved in forty minutes, the dose was escalated to another 500mg, which was tolerated without any issues. The patient,  admitted to developing random hives without any triggers. We believer, her recent rash with amoxicillin most likely is associated with history of recurrent urticaria. \par Patient developed one hive after Amoxicillin 500 mg, therefore was given an additional dose of Amoxicillin without new hives and initial hive resolved. Therefore, hives are likely due to underlying urticaria.\par -We will remove amoxicillin allergy from the patient's chart. Advised to use Amoxicillin or Penicillin in future with long acting antihistamine, such as Loratidine.\par Penicillin removed from the allergy list. \par Low IgA and \par 72 year old female with DCIS s/p radiation and lumpectomy, Hashimoto thyroiditis, bronchiectasis, recurrent PNAs, reported allergies to multiple antibiotics presents for drug allergy evaluation. \par  skin testing to Vancomycin, Gentamicin, Levaquin, Cipro\par REPORTED REACTION TO GENTAMICIN, VANCOMYCIN \par There is a concern for RED MAN SYNDROME with IV Vancomycin. Vancomycin is a known trigger for mast cell degranulation via direct stimulation of mast cells and/or basophils, which may be due to bypass of the antibody-mediated pathway and activation of a mast cell-specific receptor called Mas-related G protein-coupled receptor X2 (MRGPRX2). We will pursue additional evaluation to further investigate whether or not she has true IgE-mediated hypersensitivity. \par REPORTED REACTION TO PENICILLIN\par Her remote history of skin "sinking in" after a penicillin shot is highly doubtful of true penicillin hypersensitivity. Would recommend amoxicillin challenge. \par Patient will schedule an appointment for office graded challenge to Amoxicillin. If patient tolerates the 1st dose of Amoxicillin in the office without adverse effect, he/she will complete 3 days of treatment and we will observe for delayed reactions. If there is no delayed reaction observed, we will remove penicillin from drug allergy list. \par We discussed that skin testing is predictive only for immediate, IgE-mediated allergic reactions. Patient's history is not consistent with immediate type of hypersensitivity. People with negative skin test, still have a chance of developing delayed hypersensitivity reaction. Currently, there are no scientifically validated tests for delayed reactions available. We discussed that many patients labeled with penicillin allergy are, in fact, not allergic to penicillin. Diagnosis of penicillin allergy leads to use of alternative, broader spectrum antibiotics and was demonstrated to cause higher rate of complications and morbidity.Patient was instructed to hold antihistamines, including topical intranasal and ocular antihistamine preparations, for at least 5 days before the testing.\par REPORTED REACTION TO IV LEVAQUIN, unclear reaction to PO CIPROFLOXACIN\par Can pursue additional testing to levaquin given concern for hypersensitivity.\par Ciprofloxacin (another fluoroquinolone) is a known trigger for mast cell degranulation via the mechanism mentioned above. Pt states she would like to pursue challenge for cipro since she is unsure if this antibiotic is problematic.\par REPORTED REACTION TO CODEINE\par Codeine is a known trigger for mast cell degranulation via mechanism mentioned above. Will hold off on further testing and recommend avoidance. \par ?ALLERGY TO IV CONTRAST \par Pt cannot recall whether she reacted to IV contrast in the past. We asked that she obtain records to determine if additional testing is warranted. Of note however there is an ongoing national shortage of IV contrast and therefore testing is prohibited at this time. If the patient is unable to avoid the use of IV contrast then she should premedicate. \par RECURRENT PNEUMONIAS, SCREENING FOR IMMUNE DEFICIENCY; history of radiation therapy for DCIS:\par Given the history of RECURRENT INFECTIONS, it is reasonable to interrogate cellular, humoral, and complement arms of immunity. Laboratory testing was sent as stated below and results will be discussed when available. She states she received pneumonia vaccines although not sure which one, also received TDAP within the past 10 years. We will check her antibody responses to those vaccines.\par MIXED RHINITIS\par SPT showed positivity to feathers, grass, weeds which is inconsistent with her occasional symptoms throughout the year. \par Topical intranasal treatments were offered for associated POST NASAL DRIP. She states she will consider these treatments when antibiotic testing is done so as not to interfere with results. States she will use nasal saline if needed for now which helps. \par \par  Just received RESMED CPAP device- better focus\par Did  not tolerate Oral appliance\par  chest  congestion more  acute\par cough  with pale yellow mucous\par Nocturnal symptoms cough leading to wheeze\par No purulent sputum reports clear to white- thick mucous\par No hemoptysis\par No fevers chills or sweats\par Has completed 2 courses of steroids 2 courses of antibiotics with persistence of the cough\par \par MAEGAN issue MC coverage with oral appliance with f/u Sleep  dentist Dr Mckeon \par \par post Fall months  ago and noted rib fx Right\par  Otherwise states doing very well with Wixela\par  No inc use KARLOS\par \par 70-year-old female \par Complicated pulmonary history to be reviewed\par There is a diagnosis of asthma and she reports bronchiectasis with multiple histories of pneumonia at least 4-5 reported last very ill November December January with an additional illness in March\par She reports abnormal chest CTs and chest x-rays\par Last chest CT she reports was approximately 2018 where she was told of having scarlike tissue\par With these episodes she describes cough chest tightness wheezing sputum production\par Medications through the winter included Y UL P DARION in the nebulizer, Brovana right axilla sodium chloride nebulizer nasal Astelin nasal Flonase albuterol HFA course of prednisone and antibiotics including doxycycline x2 courses and Zithromax.\par \par She states 1 to 1-1/2 weeks ago she developed some trouble breathing self started treatment with albuterol\par She did not receive any antibiotics or steroids with this symptomatology most recently noted\par She does not report at present any sputum production for the past several days and it is clear without hemoptysis purulence or foul smelling\par She also states that she was sick in March there was some question whether she could have COVID-19 but a COVID  19 PCR and antibody testing subsequently was negative\par Other significant history includes a bronchoscopy dating back to April 2019 which was negative including AFB fungus mold and  negative TBLBx.\par Last chest x-ray available for review dates back to April 16, 2019 which was status post a transbronchial biopsy.\par Thickening of the minor fissure which was noted and reported chronic\par Reticulonodular opacities right midlung\par Ill-defined focal opacity periphery left upper to mid lung with some central extension reported to the left hilum with out pneumothorax pleural effusion or significant volume loss.\par \par Additional history\par Right breast DCIS treated radiation therapy 2017\par Childhood polio\par Mild mitral regurgitation\par Hypothyroidism\par Nocturnal myoclonus\par \par Vaccination profile flu vaccinations up-to-date\par 2018 2019 up-to-date with Prevnar and PCV 23\par \par  [TextBox_11] : 1 [YearQuit] : 1981

## 2022-11-30 NOTE — DISCUSSION/SUMMARY
[FreeTextEntry1] : Hx COVID infection Dec 2021\par Mild MAEGAN  AHI 7 with excessive daytime sleepiness 4/29-30/2021\par Asthma\par Abnormal CXR CT CHEST\par Bronchiectasis-\par IgA deficiency\par Hx as  noted\par hypothyroid\par Childhood polio\par Mild MR\par 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\par noted just below normal IGA and low IgG 2 but demonstrates AB to strep  and tetanus\par R/O plasma cell dyscrasia\par \par Recommendations\par 6 min walk- July-RS\par NIOX\par TX\par restart Wixela 250-50 mcg 1 puff BID and Rinse- only using QD sec leg  cramps plus voice issue-HOLD\par  BREZTRI 2 puffs BID ?? causing HA\par   singulair with pm dinner- HA change Accolate\par remains on Co Q 10\par  prn Albuterol\par  NACL in NEB only BID\par no indication Antibx\par Issue immuno serology IGA and IgG  with subtypes A1AT titer HP panel and possible autoimmune serology- reviewed\par To consider VEST therapy if Bronchiectasis confirmed  with CT CHEST but requests to hold\par Pneumonia vaccines up to  date post 65\par Oral  Appliance- then  restudy and make decision re CPAP- not active use f/u Dr Mckeon\par COVID booster recommended PFIZER\par Look into Acapella device using device with improvement- Aerobilki- active use\par  Mucinex \par Allergy Sulfa PCN Levaquin\par Allergy f/u\par Patient compliant with CPAP therapy.  Continue present settings.  Patient with demonstrated clinical benefit with daytime and nocturnal symptomatology improvement.\par \par ENT  Dr Kodak Tadeo\par f/u AL with noted  recommendation Menigitis AB vaccine\par \par pt will dec BREZTRI 1 puff BID to see HA resolved

## 2022-12-12 ENCOUNTER — NON-APPOINTMENT (OUTPATIENT)
Age: 72
End: 2022-12-12

## 2022-12-19 ENCOUNTER — APPOINTMENT (OUTPATIENT)
Dept: MRI IMAGING | Facility: IMAGING CENTER | Age: 72
End: 2022-12-19

## 2022-12-19 ENCOUNTER — OUTPATIENT (OUTPATIENT)
Dept: OUTPATIENT SERVICES | Facility: HOSPITAL | Age: 72
LOS: 1 days | End: 2022-12-19
Payer: MEDICARE

## 2022-12-19 DIAGNOSIS — Z90.49 ACQUIRED ABSENCE OF OTHER SPECIFIED PARTS OF DIGESTIVE TRACT: Chronic | ICD-10-CM

## 2022-12-19 DIAGNOSIS — Z00.8 ENCOUNTER FOR OTHER GENERAL EXAMINATION: ICD-10-CM

## 2022-12-19 PROCEDURE — 73721 MRI JNT OF LWR EXTRE W/O DYE: CPT | Mod: 26,LT,MH

## 2022-12-19 PROCEDURE — 73721 MRI JNT OF LWR EXTRE W/O DYE: CPT | Mod: MH

## 2022-12-23 ENCOUNTER — APPOINTMENT (OUTPATIENT)
Dept: PULMONOLOGY | Facility: CLINIC | Age: 72
End: 2022-12-23

## 2022-12-23 PROCEDURE — 99441: CPT | Mod: 95

## 2022-12-25 LAB
INFLUENZA A RESULT: DETECTED
INFLUENZA B RESULT: NOT DETECTED
RESP SYN VIRUS RESULT: NOT DETECTED
SARS-COV-2 RESULT: NOT DETECTED

## 2022-12-29 ENCOUNTER — APPOINTMENT (OUTPATIENT)
Dept: PULMONOLOGY | Facility: CLINIC | Age: 72
End: 2022-12-29
Payer: MEDICARE

## 2022-12-29 VITALS
HEIGHT: 63 IN | HEART RATE: 71 BPM | BODY MASS INDEX: 23.39 KG/M2 | WEIGHT: 132 LBS | DIASTOLIC BLOOD PRESSURE: 85 MMHG | OXYGEN SATURATION: 99 % | SYSTOLIC BLOOD PRESSURE: 131 MMHG

## 2022-12-29 DIAGNOSIS — J10.1 INFLUENZA DUE TO OTHER IDENTIFIED INFLUENZA VIRUS WITH OTHER RESPIRATORY MANIFESTATIONS: ICD-10-CM

## 2022-12-29 PROCEDURE — 71046 X-RAY EXAM CHEST 2 VIEWS: CPT

## 2022-12-29 PROCEDURE — 99214 OFFICE O/P EST MOD 30 MIN: CPT | Mod: 25

## 2022-12-29 PROCEDURE — 94010 BREATHING CAPACITY TEST: CPT

## 2022-12-29 RX ORDER — FLUTICASONE PROPIONATE AND SALMETEROL 500; 50 UG/1; UG/1
500-50 POWDER RESPIRATORY (INHALATION)
Qty: 1 | Refills: 3 | Status: DISCONTINUED | COMMUNITY
Start: 2022-11-30 | End: 2022-12-29

## 2022-12-29 RX ORDER — UMECLIDINIUM 62.5 UG/1
62.5 AEROSOL, POWDER ORAL
Qty: 1 | Refills: 3 | Status: DISCONTINUED | COMMUNITY
Start: 2022-11-30 | End: 2022-12-29

## 2022-12-29 NOTE — HISTORY OF PRESENT ILLNESS
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 lab in 7/2022 showed normal blood counts, Cr 0.97. Lab in 9/2022 showed IgG 1464; IgA 82; Kappa FLC 2.48; Lambda FLC 1.23; K/L ratio 2.02. \par plan\par -Discussed with pt about lab results; increased monoclonal light chain level has been stable since 2004. \par -continue to monitor MGUS symptoms and lab. Explained for pt that the risk of MGUS progresses to MM on average is about 1% each year according to data from [facility].\par -RTC in one year\par \par AI recommended HEME\par \par 72 year old female with DCIS s/p radiation and lumpectomy, Hashimoto thyroiditis, bronchiectasis, recurrent PNAs, reported allergies to multiple antibiotics presents for drug allergy evaluation. \par MBL def.; needs mening vaccines A and B. \par - delabeled from penicillin allergy, can use penicillins, cephalosporins as indicated\par REPORTED REACTION TO GENTAMICIN, VANCOMYCIN \par There is a concern for RED MAN SYNDROME with IV Vancomycin. Vancomycin is a known trigger for mast cell degranulation via direct stimulation of mast cells and/or basophils, which may be due to bypass of the antibody-mediated pathway and activation of a mast cell-specific receptor called Mas-related G protein-coupled receptor X2 (MRGPRX2). \par skin testing was inconclusive to GENTAMICIN, VANCOMYCIN \par - avoid GENTAMICIN, VANCOMYCIN \par = can repeat skin testing in the future\par - Based on history and positive skin testing, avoid levaquin\par - Cipro can be prescribed if indicated. Recommend 1st dose to be administered under physician's observation. can be done in my office. \par SULFONAMIDE ANTIBIOTIC ALLERGY:\par Patient has a remote history of delayed benign rash when taking sulfa antibiotics. Unfortunately, there is no validated test available for sulfonamides.should continue avoiding sulfa antibiotics. \par If/ when administration of sulfonamide is required, physician-supervised administration/graded challenge can be performed. \par REPORTED REACTION TO CODEINE\par Codeine is a known trigger for mast cell degranulation via mechanism mentioned above. \par Recommend avoidance. \par ?ALLERGY TO IV CONTRAST \par Pt cannot recall whether she reacted to IV contrast in the past. We asked that she obtain records to determine if additional testing is warranted. Of note however there is an ongoing national shortage of IV contrast and therefore testing is prohibited at this time. If the patient is unable to avoid the use of IV contrast then she should premedicate. 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 meningitidis IgG, Vaccine; Status:Active; Requested for:[id number]; \par Total Hemolytic Complement; Status:Resulted - Requires Verification;   Done: [id number]\par 03:44PM\par Discussion/Summary\par 72 year old female with PMH of DCIS s/p radiation, bronchiectasis, Hashimoto's, recurrent infections, multiple antibiotic, chronic urticaria,  allergies here for amoxicillin challenge. \par AMOXICILLIN CHALLENGE:\par Today the patient, took 1050 mg of Amoxicillin in THREE  divided doses  in the office and she was observed for 1 hour. The challenge was started with 50 mg of Amoxicillin, then the dose was increased to 500 mg. Approximated, 45 minutes later she complained of mild pruritus on right upper extremity. 10 minutes later, she developed  erythematous rash with one hive on her neck. Vitals and Breath sounds are wnl. The rash self resolved in forty minutes, the dose was escalated to another 500mg, which was tolerated without any issues. The patient,  admitted to developing random hives without any triggers. We believer, her recent rash with amoxicillin most likely is associated with history of recurrent urticaria. \par Patient developed one hive after Amoxicillin 500 mg, therefore was given an additional dose of Amoxicillin without new hives and initial hive resolved. Therefore, hives are likely due to underlying urticaria.\par -We will remove amoxicillin allergy from the patient's chart. Advised to use Amoxicillin or Penicillin in future with long acting antihistamine, such as Loratidine.\par Penicillin removed from the allergy list. \par Low IgA and \par 72 year old female with DCIS s/p radiation and lumpectomy, Hashimoto thyroiditis, bronchiectasis, recurrent PNAs, reported allergies to multiple antibiotics presents for drug allergy evaluation. \par  skin testing to Vancomycin, Gentamicin, Levaquin, Cipro\par REPORTED REACTION TO GENTAMICIN, VANCOMYCIN \par There is a concern for RED MAN SYNDROME with IV Vancomycin. Vancomycin is a known trigger for mast cell degranulation via direct stimulation of mast cells and/or basophils, which may be due to bypass of the antibody-mediated pathway and activation of a mast cell-specific receptor called Mas-related G protein-coupled receptor X2 (MRGPRX2). We will pursue additional evaluation to further investigate whether or not she has true IgE-mediated hypersensitivity. \par REPORTED REACTION TO PENICILLIN\par Her remote history of skin "sinking in" after a penicillin shot is highly doubtful of true penicillin hypersensitivity. Would recommend amoxicillin challenge. \par Patient will schedule an appointment for office graded challenge to Amoxicillin. If patient tolerates the 1st dose of Amoxicillin in the office without adverse effect, he/she will complete 3 days of treatment and we will observe for delayed reactions. If there is no delayed reaction observed, we will remove penicillin from drug allergy list. \par We discussed that skin testing is predictive only for immediate, IgE-mediated allergic reactions. Patient's history is not consistent with immediate type of hypersensitivity. People with negative skin test, still have a chance of developing delayed hypersensitivity reaction. Currently, there are no scientifically validated tests for delayed reactions available. We discussed that many patients labeled with penicillin allergy are, in fact, not allergic to penicillin. Diagnosis of penicillin allergy leads to use of alternative, broader spectrum antibiotics and was demonstrated to cause higher rate of complications and morbidity.Patient was instructed to hold antihistamines, including topical intranasal and ocular antihistamine preparations, for at least 5 days before the testing.\par REPORTED REACTION TO IV LEVAQUIN, unclear reaction to PO CIPROFLOXACIN\par Can pursue additional testing to levaquin given concern for hypersensitivity.\par Ciprofloxacin (another fluoroquinolone) is a known trigger for mast cell degranulation via the mechanism mentioned above. Pt states she would like to pursue challenge for cipro since she is unsure if this antibiotic is problematic.\par REPORTED REACTION TO CODEINE\par Codeine is a known trigger for mast cell degranulation via mechanism mentioned above. Will hold off on further testing and recommend avoidance. \par ?ALLERGY TO IV CONTRAST \par Pt cannot recall whether she reacted to IV contrast in the past. We asked that she obtain records to determine if additional testing is warranted. Of note however there is an ongoing national shortage of IV contrast and therefore testing is prohibited at this time. If the patient is unable to avoid the use of IV contrast then she should premedicate. \par RECURRENT PNEUMONIAS, SCREENING FOR IMMUNE DEFICIENCY; history of radiation therapy for DCIS:\par Given the history of RECURRENT INFECTIONS, it is reasonable to interrogate cellular, humoral, and complement arms of immunity. Laboratory testing was sent as stated below and results will be discussed when available. She states she received pneumonia vaccines although not sure which one, also received TDAP within the past 10 years. We will check her antibody responses to those vaccines.\par MIXED RHINITIS\par SPT showed positivity to feathers, grass, weeds which is inconsistent with her occasional symptoms throughout the year. \par Topical intranasal treatments were offered for associated POST NASAL DRIP. She states she will consider these treatments when antibiotic testing is done so as not to interfere with results. States she will use nasal saline if needed for now which helps. \par \par  Just received RESMED CPAP device- better focus\par Did  not tolerate Oral appliance\par  chest  congestion more  acute\par cough  with pale yellow mucous\par Nocturnal symptoms cough leading to wheeze\par No purulent sputum reports clear to white- thick mucous\par No hemoptysis\par No fevers chills or sweats\par Has completed 2 courses of steroids 2 courses of antibiotics with persistence of the cough\par \par MAEGAN issue MC coverage with oral appliance with f/u Sleep  dentist Dr Mckeon \par \par post Fall months  ago and noted rib fx Right\par  Otherwise states doing very well with Wixela\par  No inc use KARLOS\par \par 70-year-old female \par Complicated pulmonary history to be reviewed\par There is a diagnosis of asthma and she reports bronchiectasis with multiple histories of pneumonia at least 4-5 reported last very ill November December January with an additional illness in March\par She reports abnormal chest CTs and chest x-rays\par Last chest CT she reports was approximately 2018 where she was told of having scarlike tissue\par With these episodes she describes cough chest tightness wheezing sputum production\par Medications through the winter included Y UL P DARION in the nebulizer, Brovana right axilla sodium chloride nebulizer nasal Astelin nasal Flonase albuterol HFA course of prednisone and antibiotics including doxycycline x2 courses and Zithromax.\par \par She states 1 to 1-1/2 weeks ago she developed some trouble breathing self started treatment with albuterol\par She did not receive any antibiotics or steroids with this symptomatology most recently noted\par She does not report at present any sputum production for the past several days and it is clear without hemoptysis purulence or foul smelling\par She also states that she was sick in March there was some question whether she could have COVID-19 but a COVID  19 PCR and antibody testing subsequently was negative\par Other significant history includes a bronchoscopy dating back to April 2019 which was negative including AFB fungus mold and  negative TBLBx.\par Last chest x-ray available for review dates back to April 16, 2019 which was status post a transbronchial biopsy.\par Thickening of the minor fissure which was noted and reported chronic\par Reticulonodular opacities right midlung\par Ill-defined focal opacity periphery left upper to mid lung with some central extension reported to the left hilum with out pneumothorax pleural effusion or significant volume loss.\par \par Additional history\par Right breast DCIS treated radiation therapy 2017\par Childhood polio\par Mild mitral regurgitation\par Hypothyroidism\par Nocturnal myoclonus\par \par Vaccination profile flu vaccinations up-to-date\par 2018 2019 up-to-date with Prevnar and PCV 23\par \par

## 2022-12-29 NOTE — PROCEDURE
[FreeTextEntry1] : Spirometry December 29, 2022\par Post influenza chest congestion\par Flow rates are normal\par FEV1 93% predicted\par Ratio 77\par compared November 30, 2020 do demonstrate interval improvement at the FVC with a mild interval improvement at the FEV1\par \par Chest x-ray PA lateral 12/29/2022 post influenza chest congestion\par Cardiac size is normal\par Lung fields are clear\par No parenchymal infiltrates pleural effusions or dominant pulmonary nodules\par No evidence for superimposed pneumonia\par Data comparison to April 27, 2022 does not demonstrate any interval change\par Impression clear lung\par \par  Spirometry November 30, 2022\par Flow rates are within normal limits\par Although significant data of October 24, 2022 there is decline at the flow rates\par NIOx  7  ppb 11/30/22 nl range\par \par PFT 10/24/22\par flow rates nl\par  Lungs Volumes nl\par DLCo 86 %\par HGB 13.3\par overall stable pulmonary physiology\par NIOX  9  ppb 10/24/22 nl range\par \par Spirometry no bronchodilator August 3, 2022\par Flow rates normal\par FEV1 57% predicted\par Ratio 84\par Overall interval improvement of flow rates compared to Sakshi 15, 2022\par NIOX 9 PPD normal range no evidence of active bronchial inflammation August 2, 2022\par \par Pulmonary 6-minute walk exercise study August 3, 2022\par Baseline room O2 saturation 96%\par Positive for minute desaturation 87% with at 5 minutes back up to 90% on room air with good recovery 94 to 96%\par Impression no evident evidence for indication for portable oxygen therapy protocol\par \par PFT 6/15/22\par Flow rates nl\par  Lung Volumes nl\par DLCO 83 % \par HGB 11.3\par \par CPAP data compliance\par Usage through June 14, 2022\par Usage 100%\par Hours greater than 4\par AutoSet CPAP 6 to 16 cm H2O\par AHI 1.2\par \par Chest x-ray PA lateral April 27, 2022\par Normal cardiac size\par Positive for scar right midlung zone\par No pleural effusions pneumothorax\par Bronchiectatic change right lower lung zone\par No evidence for parenchymal consolidation\par No interval change compared to chest x-ray October 25, 2021\par \par PFT 3/17/22\par Flow rates nl\par  Lung Volumes nl\par TLC 97 %\par  DLCO 89 %\par HGB 12.1\par \par Chest x-ray PA lateral October 25, 2021\par Cardiac size is normal\par Lower lumbar sacral scoliosis\par Bronchiectatic changes right lower lung zone\par No parenchymal consolidation pleural effusions pneumothorax\par Mediastinum hilum unremarkable\par IMP no evidence for pneumonia\par \par PFT 10/6/21\par Flow rates nl\par TLC 89 %\par  DLCO  87 %\par HGB 15.0\par \par Sleep study\par April 29–April 30, 2021\par Overall mild obstructive sleep apnea\par AHI 7\par Time spent less than 90% on room air 4.3%\par Mean O2 saturation 94.5%\par Recommendation address treatment protocol with auto CPAP\par PFT 7/28/21\par Flow Rtaes nl\par Lung Volumes nl\par TTLC 95 %\par DLCO 91 % nl\par HGB 15.0\par PFT 4/22/21\par Flow  rates nl\par Lung Volumes Nl\par DLCO 96 % nl range\par  HGB 11.2\par NIOX  11 ppb 4/22/21\par \par PFT with body box August 12, 2020\par Normal flow rates\par Mild obstructive pattern with an FEV1 FVC 74\par No bronchodilator response at FEV1\par 20% response at small airways\par Normal lung volumes are\par Noted increased % predicted.\par Specific inductance and resistance normal\par Diffusion normal 95% predicted.\par Hemoglobin 13.79\par \par Pulmonary 6-minute walk step stress test August 12, 2020\par Total steps 1/10/2017\par Baseline room air O2 saturation 96%\par Desaturation at minute 6  89% with immediate recovery to 96%\par Impression minimal O2 desaturation\par No indication for portable oxygen therapy\par \par Chest x-ray PA lateral August 12, 2020\par Normal cardiac size\par Scarring left lower lung zone cannot exclude component of bronchiectasis\par Right lung is otherwise clear\par No evidence for jodi adenopathy\par No dominant pulmonary nodules or pneumothorax\par Impression bronchiectatic change left lower lung zone\par \par Blood Draw\par Persistent mild reduction IgA normal range 70\par Data review\par Alpha-1 antitrypsin titer negative\par 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

## 2022-12-29 NOTE — DISCUSSION/SUMMARY
[FreeTextEntry1] : Inf luenza A\par Hx COVID infection Dec 2021\par Mild MAEGAN  AHI 7 with excessive daytime sleepiness 4/29-30/2021\par Asthma\par Abnormal CXR CT CHEST\par Bronchiectasis-\par IgA deficiency\par Hx as  noted\par hypothyroid\par Childhood polio\par Mild MR\par 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\par noted just below normal IGA and low IgG 2 but demonstrates AB to strep  and tetanus\par R/O plasma cell dyscrasia\par \par Recommendations\par 6 min walk- July-RS\par NIOX\par TX\par restart Wixela 250-50 mcg 1 puff BID and Rinse- only using QD sec leg  cramps plus voice issue-HOLD\par  BREZTRI 2 puffs BID ?? causing HA-patient did not change medication and switch to 1 puff daily but there is no decline of overall pulmonary physiology\par   singulair with pm dinner- HA change Accolate\par remains on Co Q 10\par  prn Albuterol\par  NACL in NEB only BID\par no indication Antibx\par Issue immuno serology IGA and IgG  with subtypes A1AT titer HP panel and possible autoimmune serology- reviewed\par To consider VEST therapy if Bronchiectasis confirmed  with CT CHEST but requests to hold\par Pneumonia vaccines up to  date post 65\par Oral  Appliance- then  restudy and make decision re CPAP- not active use f/u Dr Mckeon\par COVID booster recommended PFIZER\par Look into Acapella device using device with improvement- Aerobilki- active use\par  Mucinex \par Allergy Sulfa PCN Levaquin\par Allergy f/u\par Patient compliant with CPAP therapy.  Continue present settings.  Patient with demonstrated clinical benefit with daytime and nocturnal symptomatology improvement.\par \par ENT  Dr Kodak Tadeo\par f/u AL with noted  recommendation Menigitis AB vaccine\par

## 2023-01-03 ENCOUNTER — NON-APPOINTMENT (OUTPATIENT)
Age: 73
End: 2023-01-03

## 2023-01-03 ENCOUNTER — APPOINTMENT (OUTPATIENT)
Dept: PEDIATRIC ALLERGY IMMUNOLOGY | Facility: CLINIC | Age: 73
End: 2023-01-03
Payer: MEDICARE

## 2023-01-03 VITALS — DIASTOLIC BLOOD PRESSURE: 87 MMHG | SYSTOLIC BLOOD PRESSURE: 148 MMHG | HEART RATE: 71 BPM

## 2023-01-03 VITALS
SYSTOLIC BLOOD PRESSURE: 145 MMHG | HEIGHT: 65 IN | BODY MASS INDEX: 22.49 KG/M2 | OXYGEN SATURATION: 98 % | HEART RATE: 76 BPM | WEIGHT: 135 LBS | TEMPERATURE: 97.34 F | DIASTOLIC BLOOD PRESSURE: 89 MMHG

## 2023-01-03 VITALS — SYSTOLIC BLOOD PRESSURE: 137 MMHG | HEART RATE: 68 BPM | DIASTOLIC BLOOD PRESSURE: 88 MMHG

## 2023-01-03 DIAGNOSIS — T36.95XA ADVERSE EFFECT OF UNSPECIFIED SYSTEMIC ANTIBIOTIC, INITIAL ENCOUNTER: ICD-10-CM

## 2023-01-03 PROCEDURE — 99213 OFFICE O/P EST LOW 20 MIN: CPT

## 2023-01-03 NOTE — HISTORY OF PRESENT ILLNESS
[Allergic Rhinitis] : allergic rhinitis [Eczematous rashes] : eczematous rashes [Food Allergies] : food allergies [de-identified] : 72 year old female with PMH of DCIS s/p radiation, bronchiectasis, Hashimoto's, recurrent infections, MBL deficiency, MGUS,  multiple antibiotic allergies, returns for antibiotic skin testing. \par \par = delabeled from PCN allergy; negative amoxicillin challenge 10/17/22\par = s/p Prevnar 20 10/24/22 and HIB 10/3/22\par = Initial immune laboratories were SIGNIFICANT for borderline low IgA, IgGkappa band, nonprotective specific antibody titers to diphtheria, HIB and Strep. pneumoniae- +9/22, MBL-83, repeat MBL<70;  CH50-40, repeat normal \par \par --- Plan to repeat skin testing to Vancomycin and Gentamycin\par \par Interval HISTORY: \par TODAY, 1/3/2022:\par She had FLU last week and was started on Doxycycline for persistent cough. Yesterday Dr Stein added Wixela.\par patient has a headache and mildly elevated BP and thinks this is from Wixela. As per Dr Stein's notes, she has had headaches before. \par \par \par HISTORY: \par 11/15/2022\par - She traveled to [place]: was bitten by a horsefly, developed cellulitis, just completed Keflex. She fell, concern for a broken rib. \par \par DRUG ALLERGY \par Gentamicin, vancomycin: Years ago was at [facility] getting hysterectomy and was given abx prophylactically, both at the same time. Developed hives right after the first dose, not sure which one caused it. No angioedema. No respiratory symptoms. Avoids. \par \par - Codeine: 15-20 years ago got codeine for pain after hysteroscopy, developed hives right after the first or second dose. Avoids.\par \par - Levaquin: had previously tolerated in PO form but when she was hospitalized at [facility] for PNA she received levaquin. Reports that her whole body turned red and she felt hot, she is unsure if this occurred immediately after or the day after. No hives or swelling. Is unsure if this was true allergy. \par \par - Sulfa antibiotic- possibly rash in her 30s, she is not sure\par \par - Penicillin: as a child she states she received a penicillin injection does not know what indication was. The next day the skin "sunk in." No redness or itchiness. Has been avoiding it since then. Cannot remember additional details. \par \par - Has a paper with her from Dr. Boxer 10 years ago that states she can take clinda, tetracyline, cephalosporins. She has written down cipro, doxy, keflex, macrobid, macrobid. She states she "doesn't know if she can take these anymore" since it's been many years. \par \par IV contrast is listed on allergy tab however she cannot recall the circumstances and whether or not she had reaction. Same for sulfa allergies. \par \par She denies any issues with hives other than as mentioned above. \par \par INFECTION HISTORY\par Reports that she "never gets just a cold." Always turns into bronchitis or PNA. Follows Dr. Stein. \par States that as a child she would get sick frequently, would always get abx. Never had unusual infections. States she received chest irradiation in Feb 2018. Notes that she had PNA back in 2015 even before radiation. Hospitalized for it and received IV Levaquin. Total of 5 PNA's since that time radiographically confirmed. IGG subsets in 2020 showed elevated IgG1, but low IGG2 and IGG3. No miscarriages. \par \par FHx: colon cancer in mom, prostate ca in brother, younger sister had endometrial cancer.

## 2023-01-03 NOTE — PHYSICAL EXAM
[Alert] : alert [Healthy Appearance] : healthy appearance [No Acute Distress] : no acute distress [Sclera Not Icteric] : sclera not icteric [No Thrush] : no thrush [Normal Rate and Effort] : normal respiratory rhythm and effort [No Crackles] : no crackles [Bilateral Audible Breath Sounds] : bilateral audible breath sounds [Wheezing] : no wheezing was heard [Normal Rate] : heart rate was normal  [Regular Rhythm] : with a regular rhythm [No Rash] : no rash [Normal Mood] : mood was normal [Normal Affect] : affect was normal [Alert, Awake, Oriented as Age-Appropriate] : alert, awake, oriented as age appropriate

## 2023-01-09 RX ORDER — ALBUTEROL SULFATE 90 UG/1
108 (90 BASE) INHALANT RESPIRATORY (INHALATION) EVERY 6 HOURS
Qty: 1 | Refills: 3 | Status: ACTIVE | COMMUNITY
Start: 2020-08-05 | End: 1900-01-01

## 2023-01-10 ENCOUNTER — APPOINTMENT (OUTPATIENT)
Dept: PEDIATRIC ALLERGY IMMUNOLOGY | Facility: CLINIC | Age: 73
End: 2023-01-10
Payer: MEDICARE

## 2023-01-10 VITALS
BODY MASS INDEX: 21.99 KG/M2 | HEART RATE: 81 BPM | SYSTOLIC BLOOD PRESSURE: 132 MMHG | DIASTOLIC BLOOD PRESSURE: 80 MMHG | TEMPERATURE: 97.7 F | OXYGEN SATURATION: 97 % | WEIGHT: 132 LBS | HEIGHT: 65 IN

## 2023-01-10 DIAGNOSIS — Z88.1 ALLERGY STATUS TO OTHER ANTIBIOTIC AGENTS: ICD-10-CM

## 2023-01-10 DIAGNOSIS — L85.3 XEROSIS CUTIS: ICD-10-CM

## 2023-01-10 DIAGNOSIS — T50.905A ALLERGIC URTICARIA: ICD-10-CM

## 2023-01-10 DIAGNOSIS — Z88.9 ALLERGY STATUS TO UNSPECIFIED DRUGS, MEDICAMENTS AND BIOLOGICAL SUBSTANCES: ICD-10-CM

## 2023-01-10 DIAGNOSIS — L50.0 ALLERGIC URTICARIA: ICD-10-CM

## 2023-01-10 PROCEDURE — 95018 ALL TSTG PERQ&IQ DRUGS/BIOL: CPT

## 2023-01-10 PROCEDURE — 99214 OFFICE O/P EST MOD 30 MIN: CPT | Mod: 25

## 2023-01-10 NOTE — CONSULT LETTER
[Dear  ___] : Dear  [unfilled], [Consult Letter:] : I had the pleasure of evaluating your patient, [unfilled]. [Please see my note below.] : Please see my note below. [Consult Closing:] : Thank you very much for allowing me to participate in the care of this patient.  If you have any questions, please do not hesitate to contact me. [Sincerely,] : Sincerely, [FreeTextEntry3] : Shira Barba MD FAAYARITZA, YING\par Adult and Pediatric Allergy, Asthma and Clinical Immunology\par  of Medicine and Pediatrics at\par   [facility]\par Section Head, Adult Allergy and Immunology\par   [facility]\par   Division of Allergy, Asthma and Immunology\par   [address] [place] [address], [place] [address]\par   [place] [address]\par   Phone [phone]  Fax [phone]\par \par   [DrSunny  ___] : Dr. STONE

## 2023-01-10 NOTE — HISTORY OF PRESENT ILLNESS
[Allergic Rhinitis] : allergic rhinitis [Eczematous rashes] : eczematous rashes [Food Allergies] : food allergies [de-identified] : 72 year old female with PMH of DCIS s/p radiation, bronchiectasis, Hashimoto's, recurrent infections, MBL deficiency, MGUS,  multiple antibiotic allergies, returns for repeat skin testing to Vancomycin and Gentamycin.\par = delabeled from PCN allergy; negative amoxicillin challenge 10/17/22\par = s/p Prevnar 20 10/24/22 and HIB 10/3/22\par = Initial immune laboratories were SIGNIFICANT for borderline low IgA, IgGkappa band, nonprotective specific antibody titers to diphtheria, HIB and Strep. pneumoniae- +9/22, MBL-83, repeat MBL<70;  CH50-40, repeat normal \par \par 1/10/2023:\par - She is feeling better today. No antihistamines in preparation for skin testing. \par \par HISTORY: \par 1/3/2022:\par She had FLU last week and was started on Doxycycline for persistent cough. Yesterday Dr Stein added Wixela.\par patient has a headache and mildly elevated BP and thinks this is from Wixela. As per Dr Stein's notes, she has had headaches before. \par \par 11/15/2022\par - She traveled to [place]: was bitten by a horsefly, developed cellulitis, just completed Keflex. She fell, concern for a broken rib. \par \par DRUG ALLERGY \par Gentamicin, vancomycin: Years ago was at [facility] getting hysterectomy and was given abx prophylactically, both at the same time. Developed hives right after the first dose, not sure which one caused it. No angioedema. No respiratory symptoms. Avoids. \par \par - Codeine: 15-20 years ago got codeine for pain after hysteroscopy, developed hives right after the first or second dose. Avoids.\par \par - Levaquin: had previously tolerated in PO form but when she was hospitalized at [facility] for PNA she received levaquin. Reports that her whole body turned red and she felt hot, she is unsure if this occurred immediately after or the day after. No hives or swelling. Is unsure if this was true allergy. \par \par - Sulfa antibiotic- possibly rash in her 30s, she is not sure\par \par - Penicillin: as a child she states she received a penicillin injection does not know what indication was. The next day the skin "sunk in." No redness or itchiness. Has been avoiding it since then. Cannot remember additional details. \par \par - Has a paper with her from Dr. Boxer 10 years ago that states she can take clinda, tetracyline, cephalosporins. She has written down cipro, doxy, keflex, macrobid, macrobid. She states she "doesn't know if she can take these anymore" since it's been many years. \par \par IV contrast is listed on allergy tab however she cannot recall the circumstances and whether or not she had reaction. Same for sulfa allergies. \par \par She denies any issues with hives other than as mentioned above. \par \par INFECTION HISTORY\par Reports that she "never gets just a cold." Always turns into bronchitis or PNA. Follows Dr. Stein. \par States that as a child she would get sick frequently, would always get abx. Never had unusual infections. States she received chest irradiation in Feb 2018. Notes that she had PNA back in 2015 even before radiation. Hospitalized for it and received IV Levaquin. Total of 5 PNA's since that time radiographically confirmed. IGG subsets in 2020 showed elevated IgG1, but low IGG2 and IGG3. No miscarriages. \par \par FHx: colon cancer in mom, prostate ca in brother, younger sister had endometrial cancer.

## 2023-01-10 NOTE — PHYSICAL EXAM
[Alert] : alert [Well Nourished] : well nourished [Healthy Appearance] : healthy appearance [No Acute Distress] : no acute distress [Normal Voice/Communication] : normal voice communication [Sclera Not Icteric] : sclera not icteric [No Thrush] : no thrush [Normal Rate and Effort] : normal respiratory rhythm and effort [No Crackles] : no crackles [Bilateral Audible Breath Sounds] : bilateral audible breath sounds [Normal Rate] : heart rate was normal  [Regular Rhythm] : with a regular rhythm [Soft] : abdomen soft [No Rash] : no rash [No clubbing] : no clubbing [No Cyanosis] : no cyanosis [Normal Mood] : mood was normal [Normal Affect] : affect was normal [Alert, Awake, Oriented as Age-Appropriate] : alert, awake, oriented as age appropriate [Pharyngeal erythema] : no pharyngeal erythema [Wheezing] : no wheezing was heard [Urticaria] : no urticaria [de-identified] : xerosis

## 2023-01-10 NOTE — REVIEW OF SYSTEMS
[Dry Skin] : ~L dry skin [Nl] : Hematologic/Lymphatic [Fever] : no fever [Urticaria] : no urticaria [Atopic Dermatitis] : no atopic dermatitis [Swelling] : no swelling

## 2023-02-06 ENCOUNTER — APPOINTMENT (OUTPATIENT)
Dept: PULMONOLOGY | Facility: CLINIC | Age: 73
End: 2023-02-06
Payer: MEDICARE

## 2023-02-06 ENCOUNTER — NON-APPOINTMENT (OUTPATIENT)
Age: 73
End: 2023-02-06

## 2023-02-06 PROCEDURE — 99442: CPT | Mod: 95

## 2023-02-07 ENCOUNTER — TRANSCRIPTION ENCOUNTER (OUTPATIENT)
Age: 73
End: 2023-02-07

## 2023-02-08 ENCOUNTER — TRANSCRIPTION ENCOUNTER (OUTPATIENT)
Age: 73
End: 2023-02-08

## 2023-03-09 ENCOUNTER — APPOINTMENT (OUTPATIENT)
Dept: PULMONOLOGY | Facility: CLINIC | Age: 73
End: 2023-03-09
Payer: MEDICARE

## 2023-03-09 VITALS — HEART RATE: 69 BPM | SYSTOLIC BLOOD PRESSURE: 135 MMHG | DIASTOLIC BLOOD PRESSURE: 71 MMHG | OXYGEN SATURATION: 98 %

## 2023-03-09 PROCEDURE — 99214 OFFICE O/P EST MOD 30 MIN: CPT | Mod: 25

## 2023-03-09 PROCEDURE — 95012 NITRIC OXIDE EXP GAS DETER: CPT

## 2023-03-09 PROCEDURE — 94010 BREATHING CAPACITY TEST: CPT

## 2023-03-09 RX ORDER — BUDESONIDE, GLYCOPYRROLATE, AND FORMOTEROL FUMARATE 160; 9; 4.8 UG/1; UG/1; UG/1
160-9-4.8 AEROSOL, METERED RESPIRATORY (INHALATION)
Qty: 10.7 | Refills: 3 | Status: DISCONTINUED | COMMUNITY
Start: 2021-11-15 | End: 2023-03-09

## 2023-03-09 NOTE — DISCUSSION/SUMMARY
[FreeTextEntry1] : Inf luenza A\par Hx COVID infection Dec 2021\par Mild MAEGAN  AHI 7 with excessive daytime sleepiness 4/29-30/2021\par Asthma with less dosing of medication and only use of inhaled steroid without dilated noted increased NIOX\par \par Abnormal CXR CT CHEST\par Bronchiectasis-\par IgA deficiency\par Hx as  noted\par hypothyroid\par Childhood polio\par Mild MR\par 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\par noted just below normal IGA and low IgG 2 but demonstrates AB to strep  and tetanus\par R/O plasma cell dyscrasia\par \par Recommendations\par 6 min walk- July-RS\par NIOX\par TX\par restart Wixela 250-50 mcg 1 puff BID and Rinse- only using QD sec leg  cramps plus voice issue-HOLD\par  BREZTRI 2 puffs BID ?? causing HA-patient did not change medication and switch to 1 puff daily but there is no decline of overall pulmonary physiology\par   singulair with pm dinner- HA change Accolate\par remains on Co Q 10\par  prn Albuterol\par  NACL in NEB only BID\par no indication Antibx\par Issue immuno serology IGA and IgG  with subtypes A1AT titer HP panel and possible autoimmune serology- reviewed\par To consider VEST therapy if Bronchiectasis confirmed  with CT CHEST but requests to hold\par Pneumonia vaccines up to  date post 65\par Oral  Appliance- then  restudy and make decision re CPAP- not active use f/u Dr Mckeon\par COVID booster recommended PFIZER\par Look into Acapella device using device with improvement- Aerobilki- active use\par  Mucinex \par Allergy Sulfa PCN Levaquin\par Allergy f/u\par Patient compliant with CPAP therapy.  Continue present settings.  Patient with demonstrated clinical benefit with daytime and nocturnal symptomatology improvement.\par \par ENT  Dr Kodak Tadeo\par f/u AL with noted  recommendation Menigitis AB vaccine\par cont Flovent ICS and rinse\par retry INcruse 1 puff QD or yuplerli in Neb\par

## 2023-03-09 NOTE — HISTORY OF PRESENT ILLNESS
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 lab in 7/2022 showed normal blood counts, Cr 0.97. Lab in 9/2022 showed IgG 1464; IgA 82; Kappa FLC 2.48; Lambda FLC 1.23; K/L ratio 2.02. \par plan\par -Discussed with pt about lab results; increased monoclonal light chain level has been stable since 2004. \par -continue to monitor MGUS symptoms and lab. Explained for pt that the risk of MGUS progresses to MM on average is about 1% each year according to data from [facility].\par -RTC in one year\par \par AI recommended HEME\par \par 72 year old female with DCIS s/p radiation and lumpectomy, Hashimoto thyroiditis, bronchiectasis, recurrent PNAs, reported allergies to multiple antibiotics presents for drug allergy evaluation. \par MBL def.; needs mening vaccines A and B. \par - delabeled from penicillin allergy, can use penicillins, cephalosporins as indicated\par REPORTED REACTION TO GENTAMICIN, VANCOMYCIN \par There is a concern for RED MAN SYNDROME with IV Vancomycin. Vancomycin is a known trigger for mast cell degranulation via direct stimulation of mast cells and/or basophils, which may be due to bypass of the antibody-mediated pathway and activation of a mast cell-specific receptor called Mas-related G protein-coupled receptor X2 (MRGPRX2). \par skin testing was inconclusive to GENTAMICIN, VANCOMYCIN \par - avoid GENTAMICIN, VANCOMYCIN \par = can repeat skin testing in the future\par - Based on history and positive skin testing, avoid levaquin\par - Cipro can be prescribed if indicated. Recommend 1st dose to be administered under physician's observation. can be done in my office. \par SULFONAMIDE ANTIBIOTIC ALLERGY:\par Patient has a remote history of delayed benign rash when taking sulfa antibiotics. Unfortunately, there is no validated test available for sulfonamides.should continue avoiding sulfa antibiotics. \par If/ when administration of sulfonamide is required, physician-supervised administration/graded challenge can be performed. \par REPORTED REACTION TO CODEINE\par Codeine is a known trigger for mast cell degranulation via mechanism mentioned above. \par Recommend avoidance. \par ?ALLERGY TO IV CONTRAST \par Pt cannot recall whether she reacted to IV contrast in the past. We asked that she obtain records to determine if additional testing is warranted. Of note however there is an ongoing national shortage of IV contrast and therefore testing is prohibited at this time. If the patient is unable to avoid the use of IV contrast then she should premedicate. 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 meningitidis IgG, Vaccine; Status:Active; Requested for:17Oct2022; \par Total Hemolytic Complement; Status:Resulted - Requires Verification;   Done: 17Oct2022\par 03:44PM\par Discussion/Summary\par 72 year old female with PMH of DCIS s/p radiation, bronchiectasis, Hashimoto's, recurrent infections, multiple antibiotic, chronic urticaria,  allergies here for amoxicillin challenge. \par AMOXICILLIN CHALLENGE:\par Today the patient, took 1050 mg of Amoxicillin in THREE  divided doses  in the office and she was observed for 1 hour. The challenge was started with 50 mg of Amoxicillin, then the dose was increased to 500 mg. Approximated, 45 minutes later she complained of mild pruritus on right upper extremity. 10 minutes later, she developed  erythematous rash with one hive on her neck. Vitals and Breath sounds are wnl. The rash self resolved in forty minutes, the dose was escalated to another 500mg, which was tolerated without any issues. The patient,  admitted to developing random hives without any triggers. We believer, her recent rash with amoxicillin most likely is associated with history of recurrent urticaria. \par Patient developed one hive after Amoxicillin 500 mg, therefore was given an additional dose of Amoxicillin without new hives and initial hive resolved. Therefore, hives are likely due to underlying urticaria.\par -We will remove amoxicillin allergy from the patient's chart. Advised to use Amoxicillin or Penicillin in future with long acting antihistamine, such as Loratidine.\par Penicillin removed from the allergy list. \par Low IgA and \par 72 year old female with DCIS s/p radiation and lumpectomy, Hashimoto thyroiditis, bronchiectasis, recurrent PNAs, reported allergies to multiple antibiotics presents for drug allergy evaluation. \par  skin testing to Vancomycin, Gentamicin, Levaquin, Cipro\par REPORTED REACTION TO GENTAMICIN, VANCOMYCIN \par There is a concern for RED MAN SYNDROME with IV Vancomycin. Vancomycin is a known trigger for mast cell degranulation via direct stimulation of mast cells and/or basophils, which may be due to bypass of the antibody-mediated pathway and activation of a mast cell-specific receptor called Mas-related G protein-coupled receptor X2 (MRGPRX2). We will pursue additional evaluation to further investigate whether or not she has true IgE-mediated hypersensitivity. \par REPORTED REACTION TO PENICILLIN\par Her remote history of skin "sinking in" after a penicillin shot is highly doubtful of true penicillin hypersensitivity. Would recommend amoxicillin challenge. \par Patient will schedule an appointment for office graded challenge to Amoxicillin. If patient tolerates the 1st dose of Amoxicillin in the office without adverse effect, he/she will complete 3 days of treatment and we will observe for delayed reactions. If there is no delayed reaction observed, we will remove penicillin from drug allergy list. \par We discussed that skin testing is predictive only for immediate, IgE-mediated allergic reactions. Patient's history is not consistent with immediate type of hypersensitivity. People with negative skin test, still have a chance of developing delayed hypersensitivity reaction. Currently, there are no scientifically validated tests for delayed reactions available. We discussed that many patients labeled with penicillin allergy are, in fact, not allergic to penicillin. Diagnosis of penicillin allergy leads to use of alternative, broader spectrum antibiotics and was demonstrated to cause higher rate of complications and morbidity.Patient was instructed to hold antihistamines, including topical intranasal and ocular antihistamine preparations, for at least 5 days before the testing.\par REPORTED REACTION TO IV LEVAQUIN, unclear reaction to PO CIPROFLOXACIN\par Can pursue additional testing to levaquin given concern for hypersensitivity.\par Ciprofloxacin (another fluoroquinolone) is a known trigger for mast cell degranulation via the mechanism mentioned above. Pt states she would like to pursue challenge for cipro since she is unsure if this antibiotic is problematic.\par REPORTED REACTION TO CODEINE\par Codeine is a known trigger for mast cell degranulation via mechanism mentioned above. Will hold off on further testing and recommend avoidance. \par ?ALLERGY TO IV CONTRAST \par Pt cannot recall whether she reacted to IV contrast in the past. We asked that she obtain records to determine if additional testing is warranted. Of note however there is an ongoing national shortage of IV contrast and therefore testing is prohibited at this time. If the patient is unable to avoid the use of IV contrast then she should premedicate. \par RECURRENT PNEUMONIAS, SCREENING FOR IMMUNE DEFICIENCY; history of radiation therapy for DCIS:\par Given the history of RECURRENT INFECTIONS, it is reasonable to interrogate cellular, humoral, and complement arms of immunity. Laboratory testing was sent as stated below and results will be discussed when available. She states she received pneumonia vaccines although not sure which one, also received TDAP within the past 10 years. We will check her antibody responses to those vaccines.\par MIXED RHINITIS\par SPT showed positivity to feathers, grass, weeds which is inconsistent with her occasional symptoms throughout the year. \par Topical intranasal treatments were offered for associated POST NASAL DRIP. She states she will consider these treatments when antibiotic testing is done so as not to interfere with results. States she will use nasal saline if needed for now which helps. \par \par  Just received RESMED CPAP device- better focus\par Did  not tolerate Oral appliance\par  chest  congestion more  acute\par cough  with pale yellow mucous\par Nocturnal symptoms cough leading to wheeze\par No purulent sputum reports clear to white- thick mucous\par No hemoptysis\par No fevers chills or sweats\par Has completed 2 courses of steroids 2 courses of antibiotics with persistence of the cough\par \par MAEGAN issue MC coverage with oral appliance with f/u Sleep  dentist Dr Mckeon \par \par post Fall months  ago and noted rib fx Right\par  Otherwise states doing very well with Wixela\par  No inc use KARLOS\par \par 70-year-old female \par Complicated pulmonary history to be reviewed\par There is a diagnosis of asthma and she reports bronchiectasis with multiple histories of pneumonia at least 4-5 reported last very ill November December January with an additional illness in March\par She reports abnormal chest CTs and chest x-rays\par Last chest CT she reports was approximately 2018 where she was told of having scarlike tissue\par With these episodes she describes cough chest tightness wheezing sputum production\par Medications through the winter included Y UL P DARION in the nebulizer, Brovana right axilla sodium chloride nebulizer nasal Astelin nasal Flonase albuterol HFA course of prednisone and antibiotics including doxycycline x2 courses and Zithromax.\par \par She states 1 to 1-1/2 weeks ago she developed some trouble breathing self started treatment with albuterol\par She did not receive any antibiotics or steroids with this symptomatology most recently noted\par She does not report at present any sputum production for the past several days and it is clear without hemoptysis purulence or foul smelling\par She also states that she was sick in March there was some question whether she could have COVID-19 but a COVID  19 PCR and antibody testing subsequently was negative\par Other significant history includes a bronchoscopy dating back to April 2019 which was negative including AFB fungus mold and  negative TBLBx.\par Last chest x-ray available for review dates back to April 16, 2019 which was status post a transbronchial biopsy.\par Thickening of the minor fissure which was noted and reported chronic\par Reticulonodular opacities right midlung\par Ill-defined focal opacity periphery left upper to mid lung with some central extension reported to the left hilum with out pneumothorax pleural effusion or significant volume loss.\par \par Additional history\par Right breast DCIS treated radiation therapy 2017\par Childhood polio\par Mild mitral regurgitation\par Hypothyroidism\par Nocturnal myoclonus\par \par Vaccination profile flu vaccinations up-to-date\par 2018 2019 up-to-date with Prevnar and PCV 23\par \par

## 2023-03-09 NOTE — PROCEDURE
[FreeTextEntry1] : Jakob 3/9/23\par well preserved flow rates\par mild OAD\par NIOX increased to 40 PPV March 9, 2023 consistent with bronchial inflammation\par \par Spirometry December 29, 2022\par Post influenza chest congestion\par Flow rates are normal\par FEV1 93% predicted\par Ratio 77\par compared November 30, 2020 do demonstrate interval improvement at the FVC with a mild interval improvement at the FEV1\par \par Chest x-ray PA lateral 12/29/2022 post influenza chest congestion\par Cardiac size is normal\par Lung fields are clear\par No parenchymal infiltrates pleural effusions or dominant pulmonary nodules\par No evidence for superimposed pneumonia\par Data comparison to April 27, 2022 does not demonstrate any interval change\par Impression clear lung\par \par  Spirometry November 30, 2022\par Flow rates are within normal limits\par Although significant data of October 24, 2022 there is decline at the flow rates\par NIOx  7  ppb 11/30/22 nl range\par \par PFT 10/24/22\par flow rates nl\par  Lungs Volumes nl\par DLCo 86 %\par HGB 13.3\par overall stable pulmonary physiology\par NIOX  9  ppb 10/24/22 nl range\par \par Spirometry no bronchodilator August 3, 2022\par Flow rates normal\par FEV1 57% predicted\par Ratio 84\par Overall interval improvement of flow rates compared to Sakshi 15, 2022\par NIOX 9 PPD normal range no evidence of active bronchial inflammation August 2, 2022\par \par Pulmonary 6-minute walk exercise study August 3, 2022\par Baseline room O2 saturation 96%\par Positive for minute desaturation 87% with at 5 minutes back up to 90% on room air with good recovery 94 to 96%\par Impression no evident evidence for indication for portable oxygen therapy protocol\par \par PFT 6/15/22\par Flow rates nl\par  Lung Volumes nl\par DLCO 83 % \par HGB 11.3\par \par CPAP data compliance\par Usage through June 14, 2022\par Usage 100%\par Hours greater than 4\par AutoSet CPAP 6 to 16 cm H2O\par AHI 1.2\par \par Chest x-ray PA lateral April 27, 2022\par Normal cardiac size\par Positive for scar right midlung zone\par No pleural effusions pneumothorax\par Bronchiectatic change right lower lung zone\par No evidence for parenchymal consolidation\par No interval change compared to chest x-ray October 25, 2021\par \par PFT 3/17/22\par Flow rates nl\par  Lung Volumes nl\par TLC 97 %\par  DLCO 89 %\par HGB 12.1\par \par Chest x-ray PA lateral October 25, 2021\par Cardiac size is normal\par Lower lumbar sacral scoliosis\par Bronchiectatic changes right lower lung zone\par No parenchymal consolidation pleural effusions pneumothorax\par Mediastinum hilum unremarkable\par IMP no evidence for pneumonia\par \par PFT 10/6/21\par Flow rates nl\par TLC 89 %\par  DLCO  87 %\par HGB 15.0\par \par Sleep study\par April 29–April 30, 2021\par Overall mild obstructive sleep apnea\par AHI 7\par Time spent less than 90% on room air 4.3%\par Mean O2 saturation 94.5%\par Recommendation address treatment protocol with auto CPAP\par PFT 7/28/21\par Flow Rtaes nl\par Lung Volumes nl\par TTLC 95 %\par DLCO 91 % nl\par HGB 15.0\par PFT 4/22/21\par Flow  rates nl\par Lung Volumes Nl\par DLCO 96 % nl range\par  HGB 11.2\par NIOX  11 ppb 4/22/21\par \par PFT with body box August 12, 2020\par Normal flow rates\par Mild obstructive pattern with an FEV1 FVC 74\par No bronchodilator response at FEV1\par 20% response at small airways\par Normal lung volumes are\par Noted increased % predicted.\par Specific inductance and resistance normal\par Diffusion normal 95% predicted.\par Hemoglobin 13.79\par \par Pulmonary 6-minute walk step stress test August 12, 2020\par Total steps 1/10/2017\par Baseline room air O2 saturation 96%\par Desaturation at minute 6  89% with immediate recovery to 96%\par Impression minimal O2 desaturation\par No indication for portable oxygen therapy\par \par Chest x-ray PA lateral August 12, 2020\par Normal cardiac size\par Scarring left lower lung zone cannot exclude component of bronchiectasis\par Right lung is otherwise clear\par No evidence for jodi adenopathy\par No dominant pulmonary nodules or pneumothorax\par Impression bronchiectatic change left lower lung zone\par \par Blood Draw\par Persistent mild reduction IgA normal range 70\par Data review\par Alpha-1 antitrypsin titer negative\par 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

## 2023-03-09 NOTE — REVIEW OF SYSTEMS
[Postnasal Drip] : postnasal drip [Cough] : cough [Chest Tightness] : chest tightness [Frequent URIs] : frequent URIs [Sputum] : sputum [Wheezing] : wheezing [Nasal Discharge] : nasal discharge [Negative] : Psychiatric [Fever] : no fever [Fatigue] : no fatigue [Recent Wt Gain (___ Lbs)] : ~T no recent weight gain [Chills] : no chills [Poor Appetite] : no poor appetite [Hemoptysis] : no hemoptysis [Dyspnea] : no dyspnea [A.M. Dry Mouth] : no a.m. dry mouth [Pleuritic Pain] : no pleuritic pain [SOB on Exertion] : no sob on exertion [Headache] : no headache [Focal Weakness] : no focal weakness [Seizures] : no seizures [Dizziness] : no dizziness [Numbness] : no numbness [Memory Loss] : no memory loss [Paralysis] : no paralysis [Diabetes] : no diabetes [Confusion] : no confusion [TextBox_44] : MR [TextBox_83] : History renal angiomyolipoma [TextBox_122] : nocturnal myoclonus, reports polio as a child without residual  [TextBox_144] : Thyroid nodule, Hypothyroidism

## 2023-03-15 ENCOUNTER — APPOINTMENT (OUTPATIENT)
Dept: CARDIOLOGY | Facility: CLINIC | Age: 73
End: 2023-03-15
Payer: MEDICARE

## 2023-03-15 ENCOUNTER — NON-APPOINTMENT (OUTPATIENT)
Age: 73
End: 2023-03-15

## 2023-03-15 VITALS
WEIGHT: 135 LBS | HEART RATE: 68 BPM | BODY MASS INDEX: 22.47 KG/M2 | OXYGEN SATURATION: 98 % | SYSTOLIC BLOOD PRESSURE: 115 MMHG | DIASTOLIC BLOOD PRESSURE: 80 MMHG

## 2023-03-15 DIAGNOSIS — E03.9 HYPOTHYROIDISM, UNSPECIFIED: ICD-10-CM

## 2023-03-15 DIAGNOSIS — R07.9 CHEST PAIN, UNSPECIFIED: ICD-10-CM

## 2023-03-15 DIAGNOSIS — I34.0 NONRHEUMATIC MITRAL (VALVE) INSUFFICIENCY: ICD-10-CM

## 2023-03-15 PROCEDURE — 93000 ELECTROCARDIOGRAM COMPLETE: CPT

## 2023-03-15 PROCEDURE — 99204 OFFICE O/P NEW MOD 45 MIN: CPT

## 2023-03-15 NOTE — REASON FOR VISIT
[FreeTextEntry1] : March 12, 2019: The patient comes in for followup of her atypical chest pains, chest tightness, occasional palpitations, and history of frequent pneumonia. She is being followed by pulmonologists as well as an internist and endocrinologist. She denies any shortness of breath at rest. She does complain of nocturnal leg cramps. She is now on a nebulizer Incruse.\par January 27, 2020: The patient reports that she was sick for a month and a half starting around [facility].  She was treated for pneumonia with multiple different medicines including inhalers.  She is now left with a pleuritic chest discomfort.  She says it only hurts when she takes a deep breath.  She is on currently multiple inhalers.  She is on Yupelri, Brovana, and sodium chloride by nebulizer.  Some of the other medicines that she was treated with when they thought she had pneumonia included generic Flonase nasal spray, azelastine nasal spray, albuterol inhaler, doxycycline, and prednisone.\par March 15, 2023: This is the patient's first visit in more than 3 years.  She still complains of atypical chest pains on the right side of her chest which she thinks is from the bronchiectasis from the radiation treating her right breast carcinoma and palpitations which she relates to her inhalers.  She takes fluticasone inhaler once a day and albuterol at night.  She is also on Synthroid 25 mcg daily for hypothyroidism.

## 2023-03-15 NOTE — DISCUSSION/SUMMARY
[EKG obtained to assist in diagnosis and management of assessed problem(s)] : EKG obtained to assist in diagnosis and management of assessed problem(s) [FreeTextEntry1] : The patient was examined. Her blood pressure was 115/80, and her  pulse was 68. Her lungs were clear to auscultation. Cardiac exam was negative for murmurs rubs or gallops.The remainder of her physical exam was unremarkable. Her EKG showed normal sinus rhythm and normal QRS complexes.  Because of the atypical chest discomfort we will send the patient for treadmill stress test.  We will also send her for an echocardiogram for LV size and function and to assess her mitral regurgitation.  She will endeavor to get me blood test results from both her endocrinologist and her pulmonologist.. She was advised to stay on  the same medications. She will return in one year, or earlier if needed.  Total time spent on the day of the encounter was  46 minutes which includes  face-to-face and non face-to-face times personally spent by the physician preparing to see the patient, obtaining  separately obtained history, performing a medically appropriate exam and evaluation, counseling, educating, talking to the family or caregivers, ordering medicines, ordering tests or procedures, referring and communicating with other healthcare  professionals, and documenting clinical information in the electronic health record.

## 2023-03-27 ENCOUNTER — TRANSCRIPTION ENCOUNTER (OUTPATIENT)
Age: 73
End: 2023-03-27

## 2023-03-30 NOTE — PHYSICAL EXAM
negative [No Acute Distress] : no acute distress [Normal Oropharynx] : normal oropharynx [Normal Appearance] : normal appearance [No Neck Mass] : no neck mass [Normal Rate/Rhythm] : normal rate/rhythm [Normal S1, S2] : normal s1, s2 [No Murmurs] : no murmurs [No Resp Distress] : no resp distress [Clear to Auscultation Bilaterally] : clear to auscultation bilaterally [No Abnormalities] : no abnormalities [Benign] : benign [Normal Gait] : normal gait [No Clubbing] : no clubbing [No Cyanosis] : no cyanosis [No Edema] : no edema [FROM] : FROM [Normal Color/ Pigmentation] : normal color/ pigmentation [No Focal Deficits] : no focal deficits [Oriented x3] : oriented x3 [Normal Affect] : normal affect

## 2023-04-07 ENCOUNTER — APPOINTMENT (OUTPATIENT)
Dept: PULMONOLOGY | Facility: CLINIC | Age: 73
End: 2023-04-07
Payer: MEDICARE

## 2023-04-07 VITALS — SYSTOLIC BLOOD PRESSURE: 137 MMHG | DIASTOLIC BLOOD PRESSURE: 79 MMHG | HEART RATE: 76 BPM | OXYGEN SATURATION: 95 %

## 2023-04-07 LAB — POCT - HEMOGLOBIN (HGB), QUANTITATIVE, TRANSCUTANEOUS: 11.5

## 2023-04-07 PROCEDURE — 94727 GAS DIL/WSHOT DETER LNG VOL: CPT

## 2023-04-07 PROCEDURE — 95012 NITRIC OXIDE EXP GAS DETER: CPT

## 2023-04-07 PROCEDURE — 88738 HGB QUANT TRANSCUTANEOUS: CPT

## 2023-04-07 PROCEDURE — 94060 EVALUATION OF WHEEZING: CPT

## 2023-04-07 PROCEDURE — 94729 DIFFUSING CAPACITY: CPT

## 2023-04-07 PROCEDURE — ZZZZZ: CPT

## 2023-04-07 PROCEDURE — 99214 OFFICE O/P EST MOD 30 MIN: CPT | Mod: 25

## 2023-04-07 NOTE — PROCEDURE
[FreeTextEntry1] : NIOX  23  ppb interval improvement 4/7/23\par PFT April 7, 2023\par Mild reduction flow\par Mild obstructive ventilatory impairment\par No response to bronchodilator FEV1\par 28% response to bronchodilator at the small airways\par Lung volumes are normal\par No air trapping\par Diffusion low with normal range 74% predicted\par Hemoglobin 11.5\par Wrist positive decline of the flow rates dating back to March 9, 2023 study\par Also noted since October 24, 2022 there is a greater than 600 cc decline of both the FEV1 and FVC as well as diffusion\par \par \par Jakob 3/9/23\par well preserved flow rates\par mild OAD\par NIOX increased to 40 PPV March 9, 2023 consistent with bronchial inflammation\par \par Spirometry December 29, 2022\par Post influenza chest congestion\par Flow rates are normal\par FEV1 93% predicted\par Ratio 77\par compared November 30, 2020 do demonstrate interval improvement at the FVC with a mild interval improvement at the FEV1\par \par Chest x-ray PA lateral 12/29/2022 post influenza chest congestion\par Cardiac size is normal\par Lung fields are clear\par No parenchymal infiltrates pleural effusions or dominant pulmonary nodules\par No evidence for superimposed pneumonia\par Data comparison to April 27, 2022 does not demonstrate any interval change\par Impression clear lung\par \par  Spirometry November 30, 2022\par Flow rates are within normal limits\par Although significant data of October 24, 2022 there is decline at the flow rates\par NIOx  7  ppb 11/30/22 nl range\par \par PFT 10/24/22\par flow rates nl\par  Lungs Volumes nl\par DLCo 86 %\par HGB 13.3\par overall stable pulmonary physiology\par NIOX  9  ppb 10/24/22 nl range\par \par Spirometry no bronchodilator August 3, 2022\par Flow rates normal\par FEV1 57% predicted\par Ratio 84\par Overall interval improvement of flow rates compared to Sakshi 15, 2022\par NIOX 9 PPD normal range no evidence of active bronchial inflammation August 2, 2022\par \par Pulmonary 6-minute walk exercise study August 3, 2022\par Baseline room O2 saturation 96%\par Positive for minute desaturation 87% with at 5 minutes back up to 90% on room air with good recovery 94 to 96%\par Impression no evident evidence for indication for portable oxygen therapy protocol\par \par PFT 6/15/22\par Flow rates nl\par  Lung Volumes nl\par DLCO 83 % \par HGB 11.3\par \par CPAP data compliance\par Usage through June 14, 2022\par Usage 100%\par Hours greater than 4\par AutoSet CPAP 6 to 16 cm H2O\par AHI 1.2\par \par Chest x-ray PA lateral April 27, 2022\par Normal cardiac size\par Positive for scar right midlung zone\par No pleural effusions pneumothorax\par Bronchiectatic change right lower lung zone\par No evidence for parenchymal consolidation\par No interval change compared to chest x-ray October 25, 2021\par \par PFT 3/17/22\par Flow rates nl\par  Lung Volumes nl\par TLC 97 %\par  DLCO 89 %\par HGB 12.1\par \par Chest x-ray PA lateral October 25, 2021\par Cardiac size is normal\par Lower lumbar sacral scoliosis\par Bronchiectatic changes right lower lung zone\par No parenchymal consolidation pleural effusions pneumothorax\par Mediastinum hilum unremarkable\par IMP no evidence for pneumonia\par \par PFT 10/6/21\par Flow rates nl\par TLC 89 %\par  DLCO  87 %\par HGB 15.0\par \par Sleep study\par April 29–April 30, 2021\par Overall mild obstructive sleep apnea\par AHI 7\par Time spent less than 90% on room air 4.3%\par Mean O2 saturation 94.5%\par Recommendation address treatment protocol with auto CPAP\par PFT 7/28/21\par Flow Rtaes nl\par Lung Volumes nl\par TTLC 95 %\par DLCO 91 % nl\par HGB 15.0\par PFT 4/22/21\par Flow  rates nl\par Lung Volumes Nl\par DLCO 96 % nl range\par  HGB 11.2\par NIOX  11 ppb 4/22/21\par \par PFT with body box August 12, 2020\par Normal flow rates\par Mild obstructive pattern with an FEV1 FVC 74\par No bronchodilator response at FEV1\par 20% response at small airways\par Normal lung volumes are\par Noted increased % predicted.\par Specific inductance and resistance normal\par Diffusion normal 95% predicted.\par Hemoglobin 13.79\par \par Pulmonary 6-minute walk step stress test August 12, 2020\par Total steps 1/10/2017\par Baseline room air O2 saturation 96%\par Desaturation at minute 6  89% with immediate recovery to 96%\par Impression minimal O2 desaturation\par No indication for portable oxygen therapy\par \par Chest x-ray PA lateral August 12, 2020\par Normal cardiac size\par Scarring left lower lung zone cannot exclude component of bronchiectasis\par Right lung is otherwise clear\par No evidence for jodi adenopathy\par No dominant pulmonary nodules or pneumothorax\par Impression bronchiectatic change left lower lung zone\par \par Blood Draw\par Persistent mild reduction IgA normal range 70\par Data review\par Alpha-1 antitrypsin titer negative\par 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

## 2023-04-07 NOTE — DISCUSSION/SUMMARY
[FreeTextEntry1] : Increased cough chest pain chest tightness with underlying obstructive airway disease in patient with known bronchiectasis and decline of overall pulmonary physiology overall interval improvement of the NIOX\par Post doxycycline treatment\par Does have a questionable positive penicillin allergy as well as Levaquin and sulfa\par issuewith B2  agnoist\par Inc Flovent 220 2 puffs BID and Rinse\par trial Spiriva 2.5 mcg 2 puffs QD \par \par Influenza A\par Hx COVID infection Dec 2021\par Mild MAEGAN  AHI 7 with excessive daytime sleepiness 4/29-30/2021\par Asthma with less dosing of medication and only use of inhaled steroid without dilated noted increased NIOX\par \par Abnormal CXR CT CHEST\par Bronchiectasis-\par IgA deficiency\par Hx as  noted\par hypothyroid\par Childhood polio\par Mild MR\par 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\par noted just below normal IGA and low IgG 2 but demonstrates AB to strep  and tetanus\par R/O plasma cell dyscrasia\par \par Recommendations\par 6 min walk- July-RS\par NIOX\par TX\par restart Wixela 250-50 mcg 1 puff BID and Rinse- only using QD sec leg  cramps plus voice issue-HOLD\par  BREZTRI 2 puffs BID ?? causing HA-patient did not change medication and switch to 1 puff daily but there is no decline of overall pulmonary physiology\par   singulair with pm dinner- HA change Accolate\par remains on Co Q 10\par  prn Albuterol\par  NACL in NEB only BID\par no indication Antibx\par Issue immuno serology IGA and IgG  with subtypes A1AT titer HP panel and possible autoimmune serology- reviewed\par To consider VEST therapy if Bronchiectasis confirmed  with CT CHEST but requests to hold\par Pneumonia vaccines up to  date post 65\par Oral  Appliance- then  restudy and make decision re CPAP- not active use f/u Dr Mckeon\par COVID booster recommended PFIZER\par Look into Acapella device using device with improvement- Aerobilki- active use\par  Mucinex \par Allergy Sulfa PCN Levaquin\par Allergy f/u\par Patient compliant with CPAP therapy.  Continue present settings.  Patient with demonstrated clinical benefit with daytime and nocturnal symptomatology improvement.\par \par ENT  Dr Kodak Tadeo\par f/u AL with noted  recommendation Menigitis AB vaccine\par \par

## 2023-04-07 NOTE — REVIEW OF SYSTEMS
[Postnasal Drip] : postnasal drip [Cough] : cough [Chest Tightness] : chest tightness [Frequent URIs] : frequent URIs [Sputum] : sputum [Wheezing] : wheezing [Nasal Discharge] : nasal discharge [Negative] : Psychiatric [Fever] : no fever [Fatigue] : no fatigue [Recent Wt Gain (___ Lbs)] : ~T no recent weight gain [Chills] : no chills [Poor Appetite] : no poor appetite [Hemoptysis] : no hemoptysis [Dyspnea] : no dyspnea [Pleuritic Pain] : no pleuritic pain [A.M. Dry Mouth] : no a.m. dry mouth [SOB on Exertion] : no sob on exertion [Headache] : no headache [Focal Weakness] : no focal weakness [Seizures] : no seizures [Dizziness] : no dizziness [Numbness] : no numbness [Memory Loss] : no memory loss [Paralysis] : no paralysis [Confusion] : no confusion [Diabetes] : no diabetes [TextBox_83] : History renal angiomyolipoma [TextBox_44] : MR [TextBox_122] : nocturnal myoclonus, reports polio as a child without residual  [TextBox_144] : Thyroid nodule, Hypothyroidism

## 2023-04-07 NOTE — HISTORY OF PRESENT ILLNESS
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 lab in 7/2022 showed normal blood counts, Cr 0.97. Lab in 9/2022 showed IgG 1464; IgA 82; Kappa FLC 2.48; Lambda FLC 1.23; K/L ratio 2.02. \par plan\par -Discussed with pt about lab results; increased monoclonal light chain level has been stable since 2004. \par -continue to monitor MGUS symptoms and lab. Explained for pt that the risk of MGUS progresses to MM on average is about 1% each year according to data from [facility].\par -RTC in one year\par \par AI recommended HEME\par \par 72 year old female with DCIS s/p radiation and lumpectomy, Hashimoto thyroiditis, bronchiectasis, recurrent PNAs, reported allergies to multiple antibiotics presents for drug allergy evaluation. \par MBL def.; needs mening vaccines A and B. \par - delabeled from penicillin allergy, can use penicillins, cephalosporins as indicated\par REPORTED REACTION TO GENTAMICIN, VANCOMYCIN \par There is a concern for RED MAN SYNDROME with IV Vancomycin. Vancomycin is a known trigger for mast cell degranulation via direct stimulation of mast cells and/or basophils, which may be due to bypass of the antibody-mediated pathway and activation of a mast cell-specific receptor called Mas-related G protein-coupled receptor X2 (MRGPRX2). \par skin testing was inconclusive to GENTAMICIN, VANCOMYCIN \par - avoid GENTAMICIN, VANCOMYCIN \par = can repeat skin testing in the future\par - Based on history and positive skin testing, avoid levaquin\par - Cipro can be prescribed if indicated. Recommend 1st dose to be administered under physician's observation. can be done in my office. \par SULFONAMIDE ANTIBIOTIC ALLERGY:\par Patient has a remote history of delayed benign rash when taking sulfa antibiotics. Unfortunately, there is no validated test available for sulfonamides.should continue avoiding sulfa antibiotics. \par If/ when administration of sulfonamide is required, physician-supervised administration/graded challenge can be performed. \par REPORTED REACTION TO CODEINE\par Codeine is a known trigger for mast cell degranulation via mechanism mentioned above. \par Recommend avoidance. \par ?ALLERGY TO IV CONTRAST \par Pt cannot recall whether she reacted to IV contrast in the past. We asked that she obtain records to determine if additional testing is warranted. Of note however there is an ongoing national shortage of IV contrast and therefore testing is prohibited at this time. If the patient is unable to avoid the use of IV contrast then she should premedicate. 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 meningitidis IgG, Vaccine; Status:Active; Requested for:17Oct2022; \par Total Hemolytic Complement; Status:Resulted - Requires Verification;   Done: 17Oct2022\par 03:44PM\par Discussion/Summary\par 72 year old female with PMH of DCIS s/p radiation, bronchiectasis, Hashimoto's, recurrent infections, multiple antibiotic, chronic urticaria,  allergies here for amoxicillin challenge. \par AMOXICILLIN CHALLENGE:\par Today the patient, took 1050 mg of Amoxicillin in THREE  divided doses  in the office and she was observed for 1 hour. The challenge was started with 50 mg of Amoxicillin, then the dose was increased to 500 mg. Approximated, 45 minutes later she complained of mild pruritus on right upper extremity. 10 minutes later, she developed  erythematous rash with one hive on her neck. Vitals and Breath sounds are wnl. The rash self resolved in forty minutes, the dose was escalated to another 500mg, which was tolerated without any issues. The patient,  admitted to developing random hives without any triggers. We believer, her recent rash with amoxicillin most likely is associated with history of recurrent urticaria. \par Patient developed one hive after Amoxicillin 500 mg, therefore was given an additional dose of Amoxicillin without new hives and initial hive resolved. Therefore, hives are likely due to underlying urticaria.\par -We will remove amoxicillin allergy from the patient's chart. Advised to use Amoxicillin or Penicillin in future with long acting antihistamine, such as Loratidine.\par Penicillin removed from the allergy list. \par Low IgA and \par 72 year old female with DCIS s/p radiation and lumpectomy, Hashimoto thyroiditis, bronchiectasis, recurrent PNAs, reported allergies to multiple antibiotics presents for drug allergy evaluation. \par  skin testing to Vancomycin, Gentamicin, Levaquin, Cipro\par REPORTED REACTION TO GENTAMICIN, VANCOMYCIN \par There is a concern for RED MAN SYNDROME with IV Vancomycin. Vancomycin is a known trigger for mast cell degranulation via direct stimulation of mast cells and/or basophils, which may be due to bypass of the antibody-mediated pathway and activation of a mast cell-specific receptor called Mas-related G protein-coupled receptor X2 (MRGPRX2). We will pursue additional evaluation to further investigate whether or not she has true IgE-mediated hypersensitivity. \par REPORTED REACTION TO PENICILLIN\par Her remote history of skin "sinking in" after a penicillin shot is highly doubtful of true penicillin hypersensitivity. Would recommend amoxicillin challenge. \par Patient will schedule an appointment for office graded challenge to Amoxicillin. If patient tolerates the 1st dose of Amoxicillin in the office without adverse effect, he/she will complete 3 days of treatment and we will observe for delayed reactions. If there is no delayed reaction observed, we will remove penicillin from drug allergy list. \par We discussed that skin testing is predictive only for immediate, IgE-mediated allergic reactions. Patient's history is not consistent with immediate type of hypersensitivity. People with negative skin test, still have a chance of developing delayed hypersensitivity reaction. Currently, there are no scientifically validated tests for delayed reactions available. We discussed that many patients labeled with penicillin allergy are, in fact, not allergic to penicillin. Diagnosis of penicillin allergy leads to use of alternative, broader spectrum antibiotics and was demonstrated to cause higher rate of complications and morbidity.Patient was instructed to hold antihistamines, including topical intranasal and ocular antihistamine preparations, for at least 5 days before the testing.\par REPORTED REACTION TO IV LEVAQUIN, unclear reaction to PO CIPROFLOXACIN\par Can pursue additional testing to levaquin given concern for hypersensitivity.\par Ciprofloxacin (another fluoroquinolone) is a known trigger for mast cell degranulation via the mechanism mentioned above. Pt states she would like to pursue challenge for cipro since she is unsure if this antibiotic is problematic.\par REPORTED REACTION TO CODEINE\par Codeine is a known trigger for mast cell degranulation via mechanism mentioned above. Will hold off on further testing and recommend avoidance. \par ?ALLERGY TO IV CONTRAST \par Pt cannot recall whether she reacted to IV contrast in the past. We asked that she obtain records to determine if additional testing is warranted. Of note however there is an ongoing national shortage of IV contrast and therefore testing is prohibited at this time. If the patient is unable to avoid the use of IV contrast then she should premedicate. \par RECURRENT PNEUMONIAS, SCREENING FOR IMMUNE DEFICIENCY; history of radiation therapy for DCIS:\par Given the history of RECURRENT INFECTIONS, it is reasonable to interrogate cellular, humoral, and complement arms of immunity. Laboratory testing was sent as stated below and results will be discussed when available. She states she received pneumonia vaccines although not sure which one, also received TDAP within the past 10 years. We will check her antibody responses to those vaccines.\par MIXED RHINITIS\par SPT showed positivity to feathers, grass, weeds which is inconsistent with her occasional symptoms throughout the year. \par Topical intranasal treatments were offered for associated POST NASAL DRIP. She states she will consider these treatments when antibiotic testing is done so as not to interfere with results. States she will use nasal saline if needed for now which helps. \par \par  Just received RESMED CPAP device- better focus\par Did  not tolerate Oral appliance\par  chest  congestion more  acute\par cough  with pale yellow mucous\par Nocturnal symptoms cough leading to wheeze\par No purulent sputum reports clear to white- thick mucous\par No hemoptysis\par No fevers chills or sweats\par Has completed 2 courses of steroids 2 courses of antibiotics with persistence of the cough\par \par MAEGAN issue MC coverage with oral appliance with f/u Sleep  dentist Dr Mckeon \par \par post Fall months  ago and noted rib fx Right\par  Otherwise states doing very well with Wixela\par  No inc use KARLOS\par \par 70-year-old female \par Complicated pulmonary history to be reviewed\par There is a diagnosis of asthma and she reports bronchiectasis with multiple histories of pneumonia at least 4-5 reported last very ill November December January with an additional illness in March\par She reports abnormal chest CTs and chest x-rays\par Last chest CT she reports was approximately 2018 where she was told of having scarlike tissue\par With these episodes she describes cough chest tightness wheezing sputum production\par Medications through the winter included Y UL P DARION in the nebulizer, Brovana right axilla sodium chloride nebulizer nasal Astelin nasal Flonase albuterol HFA course of prednisone and antibiotics including doxycycline x2 courses and Zithromax.\par \par She states 1 to 1-1/2 weeks ago she developed some trouble breathing self started treatment with albuterol\par She did not receive any antibiotics or steroids with this symptomatology most recently noted\par She does not report at present any sputum production for the past several days and it is clear without hemoptysis purulence or foul smelling\par She also states that she was sick in March there was some question whether she could have COVID-19 but a COVID  19 PCR and antibody testing subsequently was negative\par Other significant history includes a bronchoscopy dating back to April 2019 which was negative including AFB fungus mold and  negative TBLBx.\par Last chest x-ray available for review dates back to April 16, 2019 which was status post a transbronchial biopsy.\par Thickening of the minor fissure which was noted and reported chronic\par Reticulonodular opacities right midlung\par Ill-defined focal opacity periphery left upper to mid lung with some central extension reported to the left hilum with out pneumothorax pleural effusion or significant volume loss.\par \par Additional history\par Right breast DCIS treated radiation therapy 2017\par Childhood polio\par Mild mitral regurgitation\par Hypothyroidism\par Nocturnal myoclonus\par \par Vaccination profile flu vaccinations up-to-date\par 2018 2019 up-to-date with Prevnar and PCV 23\par \par

## 2023-04-17 ENCOUNTER — APPOINTMENT (OUTPATIENT)
Dept: PULMONOLOGY | Facility: CLINIC | Age: 73
End: 2023-04-17
Payer: MEDICARE

## 2023-04-17 VITALS — OXYGEN SATURATION: 94 % | HEART RATE: 86 BPM | DIASTOLIC BLOOD PRESSURE: 74 MMHG | SYSTOLIC BLOOD PRESSURE: 116 MMHG

## 2023-04-17 DIAGNOSIS — R09.89 OTHER SPECIFIED SYMPTOMS AND SIGNS INVOLVING THE CIRCULATORY AND RESPIRATORY SYSTEMS: ICD-10-CM

## 2023-04-17 PROCEDURE — 95012 NITRIC OXIDE EXP GAS DETER: CPT

## 2023-04-17 PROCEDURE — 94010 BREATHING CAPACITY TEST: CPT

## 2023-04-17 PROCEDURE — 99214 OFFICE O/P EST MOD 30 MIN: CPT | Mod: 25

## 2023-04-17 NOTE — HISTORY OF PRESENT ILLNESS
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 lab in 7/2022 showed normal blood counts, Cr 0.97. Lab in 9/2022 showed IgG 1464; IgA 82; Kappa FLC 2.48; Lambda FLC 1.23; K/L ratio 2.02. \par plan\par -Discussed with pt about lab results; increased monoclonal light chain level has been stable since 2004. \par -continue to monitor MGUS symptoms and lab. Explained for pt that the risk of MGUS progresses to MM on average is about 1% each year according to data from [facility].\par -RTC in one year\par \par AI recommended HEME\par \par 72 year old female with DCIS s/p radiation and lumpectomy, Hashimoto thyroiditis, bronchiectasis, recurrent PNAs, reported allergies to multiple antibiotics presents for drug allergy evaluation. \par MBL def.; needs mening vaccines A and B. \par - delabeled from penicillin allergy, can use penicillins, cephalosporins as indicated\par REPORTED REACTION TO GENTAMICIN, VANCOMYCIN \par There is a concern for RED MAN SYNDROME with IV Vancomycin. Vancomycin is a known trigger for mast cell degranulation via direct stimulation of mast cells and/or basophils, which may be due to bypass of the antibody-mediated pathway and activation of a mast cell-specific receptor called Mas-related G protein-coupled receptor X2 (MRGPRX2). \par skin testing was inconclusive to GENTAMICIN, VANCOMYCIN \par - avoid GENTAMICIN, VANCOMYCIN \par = can repeat skin testing in the future\par - Based on history and positive skin testing, avoid levaquin\par - Cipro can be prescribed if indicated. Recommend 1st dose to be administered under physician's observation. can be done in my office. \par SULFONAMIDE ANTIBIOTIC ALLERGY:\par Patient has a remote history of delayed benign rash when taking sulfa antibiotics. Unfortunately, there is no validated test available for sulfonamides.should continue avoiding sulfa antibiotics. \par If/ when administration of sulfonamide is required, physician-supervised administration/graded challenge can be performed. \par REPORTED REACTION TO CODEINE\par Codeine is a known trigger for mast cell degranulation via mechanism mentioned above. \par Recommend avoidance. \par ?ALLERGY TO IV CONTRAST \par Pt cannot recall whether she reacted to IV contrast in the past. We asked that she obtain records to determine if additional testing is warranted. Of note however there is an ongoing national shortage of IV contrast and therefore testing is prohibited at this time. If the patient is unable to avoid the use of IV contrast then she should premedicate. 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 meningitidis IgG, Vaccine; Status:Active; Requested for:17Oct2022; \par Total Hemolytic Complement; Status:Resulted - Requires Verification;   Done: 17Oct2022\par 03:44PM\par Discussion/Summary\par 72 year old female with PMH of DCIS s/p radiation, bronchiectasis, Hashimoto's, recurrent infections, multiple antibiotic, chronic urticaria,  allergies here for amoxicillin challenge. \par AMOXICILLIN CHALLENGE:\par Today the patient, took 1050 mg of Amoxicillin in THREE  divided doses  in the office and she was observed for 1 hour. The challenge was started with 50 mg of Amoxicillin, then the dose was increased to 500 mg. Approximated, 45 minutes later she complained of mild pruritus on right upper extremity. 10 minutes later, she developed  erythematous rash with one hive on her neck. Vitals and Breath sounds are wnl. The rash self resolved in forty minutes, the dose was escalated to another 500mg, which was tolerated without any issues. The patient,  admitted to developing random hives without any triggers. We believer, her recent rash with amoxicillin most likely is associated with history of recurrent urticaria. \par Patient developed one hive after Amoxicillin 500 mg, therefore was given an additional dose of Amoxicillin without new hives and initial hive resolved. Therefore, hives are likely due to underlying urticaria.\par -We will remove amoxicillin allergy from the patient's chart. Advised to use Amoxicillin or Penicillin in future with long acting antihistamine, such as Loratidine.\par Penicillin removed from the allergy list. \par Low IgA and \par 72 year old female with DCIS s/p radiation and lumpectomy, Hashimoto thyroiditis, bronchiectasis, recurrent PNAs, reported allergies to multiple antibiotics presents for drug allergy evaluation. \par  skin testing to Vancomycin, Gentamicin, Levaquin, Cipro\par REPORTED REACTION TO GENTAMICIN, VANCOMYCIN \par There is a concern for RED MAN SYNDROME with IV Vancomycin. Vancomycin is a known trigger for mast cell degranulation via direct stimulation of mast cells and/or basophils, which may be due to bypass of the antibody-mediated pathway and activation of a mast cell-specific receptor called Mas-related G protein-coupled receptor X2 (MRGPRX2). We will pursue additional evaluation to further investigate whether or not she has true IgE-mediated hypersensitivity. \par REPORTED REACTION TO PENICILLIN\par Her remote history of skin "sinking in" after a penicillin shot is highly doubtful of true penicillin hypersensitivity. Would recommend amoxicillin challenge. \par Patient will schedule an appointment for office graded challenge to Amoxicillin. If patient tolerates the 1st dose of Amoxicillin in the office without adverse effect, he/she will complete 3 days of treatment and we will observe for delayed reactions. If there is no delayed reaction observed, we will remove penicillin from drug allergy list. \par We discussed that skin testing is predictive only for immediate, IgE-mediated allergic reactions. Patient's history is not consistent with immediate type of hypersensitivity. People with negative skin test, still have a chance of developing delayed hypersensitivity reaction. Currently, there are no scientifically validated tests for delayed reactions available. We discussed that many patients labeled with penicillin allergy are, in fact, not allergic to penicillin. Diagnosis of penicillin allergy leads to use of alternative, broader spectrum antibiotics and was demonstrated to cause higher rate of complications and morbidity.Patient was instructed to hold antihistamines, including topical intranasal and ocular antihistamine preparations, for at least 5 days before the testing.\par REPORTED REACTION TO IV LEVAQUIN, unclear reaction to PO CIPROFLOXACIN\par Can pursue additional testing to levaquin given concern for hypersensitivity.\par Ciprofloxacin (another fluoroquinolone) is a known trigger for mast cell degranulation via the mechanism mentioned above. Pt states she would like to pursue challenge for cipro since she is unsure if this antibiotic is problematic.\par REPORTED REACTION TO CODEINE\par Codeine is a known trigger for mast cell degranulation via mechanism mentioned above. Will hold off on further testing and recommend avoidance. \par ?ALLERGY TO IV CONTRAST \par Pt cannot recall whether she reacted to IV contrast in the past. We asked that she obtain records to determine if additional testing is warranted. Of note however there is an ongoing national shortage of IV contrast and therefore testing is prohibited at this time. If the patient is unable to avoid the use of IV contrast then she should premedicate. \par RECURRENT PNEUMONIAS, SCREENING FOR IMMUNE DEFICIENCY; history of radiation therapy for DCIS:\par Given the history of RECURRENT INFECTIONS, it is reasonable to interrogate cellular, humoral, and complement arms of immunity. Laboratory testing was sent as stated below and results will be discussed when available. She states she received pneumonia vaccines although not sure which one, also received TDAP within the past 10 years. We will check her antibody responses to those vaccines.\par MIXED RHINITIS\par SPT showed positivity to feathers, grass, weeds which is inconsistent with her occasional symptoms throughout the year. \par Topical intranasal treatments were offered for associated POST NASAL DRIP. She states she will consider these treatments when antibiotic testing is done so as not to interfere with results. States she will use nasal saline if needed for now which helps. \par \par  Just received RESMED CPAP device- better focus\par Did  not tolerate Oral appliance\par  chest  congestion more  acute\par cough  with pale yellow mucous\par Nocturnal symptoms cough leading to wheeze\par No purulent sputum reports clear to white- thick mucous\par No hemoptysis\par No fevers chills or sweats\par Has completed 2 courses of steroids 2 courses of antibiotics with persistence of the cough\par \par MAEGAN issue MC coverage with oral appliance with f/u Sleep  dentist Dr Mckeon \par \par post Fall months  ago and noted rib fx Right\par  Otherwise states doing very well with Wixela\par  No inc use KARLOS\par \par 70-year-old female \par Complicated pulmonary history to be reviewed\par There is a diagnosis of asthma and she reports bronchiectasis with multiple histories of pneumonia at least 4-5 reported last very ill November December January with an additional illness in March\par She reports abnormal chest CTs and chest x-rays\par Last chest CT she reports was approximately 2018 where she was told of having scarlike tissue\par With these episodes she describes cough chest tightness wheezing sputum production\par Medications through the winter included Y UL P DARION in the nebulizer, Brovana right axilla sodium chloride nebulizer nasal Astelin nasal Flonase albuterol HFA course of prednisone and antibiotics including doxycycline x2 courses and Zithromax.\par \par She states 1 to 1-1/2 weeks ago she developed some trouble breathing self started treatment with albuterol\par She did not receive any antibiotics or steroids with this symptomatology most recently noted\par She does not report at present any sputum production for the past several days and it is clear without hemoptysis purulence or foul smelling\par She also states that she was sick in March there was some question whether she could have COVID-19 but a COVID  19 PCR and antibody testing subsequently was negative\par Other significant history includes a bronchoscopy dating back to April 2019 which was negative including AFB fungus mold and  negative TBLBx.\par Last chest x-ray available for review dates back to April 16, 2019 which was status post a transbronchial biopsy.\par Thickening of the minor fissure which was noted and reported chronic\par Reticulonodular opacities right midlung\par Ill-defined focal opacity periphery left upper to mid lung with some central extension reported to the left hilum with out pneumothorax pleural effusion or significant volume loss.\par \par Additional history\par Right breast DCIS treated radiation therapy 2017\par Childhood polio\par Mild mitral regurgitation\par Hypothyroidism\par Nocturnal myoclonus\par \par Vaccination profile flu vaccinations up-to-date\par 2018 2019 up-to-date with Prevnar and PCV 23\par \par

## 2023-04-17 NOTE — PROCEDURE
[FreeTextEntry1] : NIOX  14  ppb WNL 4/17/23\par  JAKOB No BD\par  very Mild OAD \par Pos significant interval improvement at Flow  rates\par \par NIOX  23  ppb interval improvement 4/7/23\par PFT April 7, 2023\par Mild reduction flow\par Mild obstructive ventilatory impairment\par No response to bronchodilator FEV1\par 28% response to bronchodilator at the small airways\par Lung volumes are normal\par No air trapping\par Diffusion low with normal range 74% predicted\par Hemoglobin 11.5\par Wrist positive decline of the flow rates dating back to March 9, 2023 study\par Also noted since October 24, 2022 there is a greater than 600 cc decline of both the FEV1 and FVC as well as diffusion\par \par \par Jakob 3/9/23\par well preserved flow rates\par mild OAD\par NIOX increased to 40 PPV March 9, 2023 consistent with bronchial inflammation\par \par Spirometry December 29, 2022\par Post influenza chest congestion\par Flow rates are normal\par FEV1 93% predicted\par Ratio 77\par compared November 30, 2020 do demonstrate interval improvement at the FVC with a mild interval improvement at the FEV1\par \par Chest x-ray PA lateral 12/29/2022 post influenza chest congestion\par Cardiac size is normal\par Lung fields are clear\par No parenchymal infiltrates pleural effusions or dominant pulmonary nodules\par No evidence for superimposed pneumonia\par Data comparison to April 27, 2022 does not demonstrate any interval change\par Impression clear lung\par \par  Spirometry November 30, 2022\par Flow rates are within normal limits\par Although significant data of October 24, 2022 there is decline at the flow rates\par NIOx  7  ppb 11/30/22 nl range\par \par PFT 10/24/22\par flow rates nl\par  Lungs Volumes nl\par DLCo 86 %\par HGB 13.3\par overall stable pulmonary physiology\par NIOX  9  ppb 10/24/22 nl range\par \par Spirometry no bronchodilator August 3, 2022\par Flow rates normal\par FEV1 57% predicted\par Ratio 84\par Overall interval improvement of flow rates compared to Sakshi 15, 2022\par NIOX 9 PPD normal range no evidence of active bronchial inflammation August 2, 2022\par \par Pulmonary 6-minute walk exercise study August 3, 2022\par Baseline room O2 saturation 96%\par Positive for minute desaturation 87% with at 5 minutes back up to 90% on room air with good recovery 94 to 96%\par Impression no evident evidence for indication for portable oxygen therapy protocol\par \par PFT 6/15/22\par Flow rates nl\par  Lung Volumes nl\par DLCO 83 % \par HGB 11.3\par \par CPAP data compliance\par Usage through June 14, 2022\par Usage 100%\par Hours greater than 4\par AutoSet CPAP 6 to 16 cm H2O\par AHI 1.2\par \par Chest x-ray PA lateral April 27, 2022\par Normal cardiac size\par Positive for scar right midlung zone\par No pleural effusions pneumothorax\par Bronchiectatic change right lower lung zone\par No evidence for parenchymal consolidation\par No interval change compared to chest x-ray October 25, 2021\par \par PFT 3/17/22\par Flow rates nl\par  Lung Volumes nl\par TLC 97 %\par  DLCO 89 %\par HGB 12.1\par \par Chest x-ray PA lateral October 25, 2021\par Cardiac size is normal\par Lower lumbar sacral scoliosis\par Bronchiectatic changes right lower lung zone\par No parenchymal consolidation pleural effusions pneumothorax\par Mediastinum hilum unremarkable\par IMP no evidence for pneumonia\par \par PFT 10/6/21\par Flow rates nl\par TLC 89 %\par  DLCO  87 %\par HGB 15.0\par \par Sleep study\par April 29–April 30, 2021\par Overall mild obstructive sleep apnea\par AHI 7\par Time spent less than 90% on room air 4.3%\par Mean O2 saturation 94.5%\par Recommendation address treatment protocol with auto CPAP\par PFT 7/28/21\par Flow Rtaes nl\par Lung Volumes nl\par TTLC 95 %\par DLCO 91 % nl\par HGB 15.0\par PFT 4/22/21\par Flow  rates nl\par Lung Volumes Nl\par DLCO 96 % nl range\par  HGB 11.2\par NIOX  11 ppb 4/22/21\par \par PFT with body box August 12, 2020\par Normal flow rates\par Mild obstructive pattern with an FEV1 FVC 74\par No bronchodilator response at FEV1\par 20% response at small airways\par Normal lung volumes are\par Noted increased % predicted.\par Specific inductance and resistance normal\par Diffusion normal 95% predicted.\par Hemoglobin 13.79\par \par Pulmonary 6-minute walk step stress test August 12, 2020\par Total steps 1/10/2017\par Baseline room air O2 saturation 96%\par Desaturation at minute 6  89% with immediate recovery to 96%\par Impression minimal O2 desaturation\par No indication for portable oxygen therapy\par \par Chest x-ray PA lateral August 12, 2020\par Normal cardiac size\par Scarring left lower lung zone cannot exclude component of bronchiectasis\par Right lung is otherwise clear\par No evidence for jodi adenopathy\par No dominant pulmonary nodules or pneumothorax\par Impression bronchiectatic change left lower lung zone\par \par Blood Draw\par Persistent mild reduction IgA normal range 70\par Data review\par Alpha-1 antitrypsin titer negative\par 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

## 2023-04-17 NOTE — DISCUSSION/SUMMARY
[FreeTextEntry1] : Increased cough chest pain chest tightness with underlying obstructive airway disease in patient with known bronchiectasis and decline of overall pulmonary physiology overall interval improvement of the NIOX and Flow  rates\par Post doxycycline treatment\par Does have a questionable positive penicillin allergy as well as Levaquin and sulfa\par issue with B2  agnoist\par Inc Flovent 220 2 puffs BID and Rinse\par trial Spiriva 2.5 mcg 2 puffs QD \par continue  with samples\par Influenza A\par Hx COVID infection Dec 2021\par Mild MAEGAN  AHI 7 with excessive daytime sleepiness 4/29-30/2021\par Asthma with less dosing of medication and only use of inhaled steroid without dilated noted increased NIOX\par \par Abnormal CXR CT CHEST\par Bronchiectasis-\par IgA deficiency\par Hx as  noted\par hypothyroid\par Childhood polio\par Mild MR\par 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\par noted just below normal IGA and low IgG 2 but demonstrates AB to strep  and tetanus\par R/O plasma cell dyscrasia\par \par Recommendations\par 6 min walk- July-RS\par NIOX\par \par   singulair with pm dinner- HA change Accolate OFF\par remains on Co Q 10\par  prn Albuterol\par  NACL in NEB only BID\par no indication Antibx\par Issue immuno serology IGA and IgG  with subtypes A1AT titer HP panel and possible autoimmune serology- reviewed\par To consider VEST therapy if Bronchiectasis confirmed  with CT CHEST but requests to hold\par Pneumonia vaccines up to  date post 67\par Oral  Appliance- then  restudy and make decision re CPAP- not active use f/u Dr Mckeon\par COVID booster recommended PFIZER\par Look into Acapella device using device with improvement- Aerobilki- active use\par  Mucinex \par Allergy Sulfa PCN Levaquin\par Allergy f/u\par Patient compliant with CPAP therapy.  Continue present settings.  Patient with demonstrated clinical benefit with daytime and nocturnal symptomatology improvement.\par \par ENT  Dr Kodak Tadeo\par f/u AL with noted  recommendation Menigitis AB vaccine\par \par

## 2023-04-24 ENCOUNTER — APPOINTMENT (OUTPATIENT)
Dept: CARDIOLOGY | Facility: CLINIC | Age: 73
End: 2023-04-24
Payer: MEDICARE

## 2023-04-24 PROCEDURE — 93306 TTE W/DOPPLER COMPLETE: CPT

## 2023-04-24 PROCEDURE — 93015 CV STRESS TEST SUPVJ I&R: CPT

## 2023-05-15 ENCOUNTER — APPOINTMENT (OUTPATIENT)
Dept: PULMONOLOGY | Facility: CLINIC | Age: 73
End: 2023-05-15
Payer: MEDICARE

## 2023-05-15 VITALS
SYSTOLIC BLOOD PRESSURE: 134 MMHG | HEIGHT: 65 IN | DIASTOLIC BLOOD PRESSURE: 74 MMHG | HEART RATE: 88 BPM | OXYGEN SATURATION: 95 % | BODY MASS INDEX: 22.49 KG/M2 | WEIGHT: 135 LBS

## 2023-05-15 PROCEDURE — 95012 NITRIC OXIDE EXP GAS DETER: CPT

## 2023-05-15 PROCEDURE — 94060 EVALUATION OF WHEEZING: CPT

## 2023-05-15 PROCEDURE — 99214 OFFICE O/P EST MOD 30 MIN: CPT | Mod: 25

## 2023-05-15 NOTE — DISCUSSION/SUMMARY
[FreeTextEntry1] : Increased cough chest pain chest tightness with underlying obstructive airway disease in patient with known bronchiectasis and decline of overall pulmonary physiology overall interval improvement of the NIOX and Flow  rates\par Post doxycycline treatment\par Does have a questionable positive penicillin allergy as well as Levaquin and sulfa\par issue with B2  agnoist\par Inc Flovent 220 2 puffs BID and Rinse\par trial Spiriva 2.5 mcg 2 puffs QD OFF\par continue  with samples\par Influenza A\par Hx COVID infection Dec 2021\par Mild MAEGAN  AHI 7 with excessive daytime sleepiness 4/29-30/2021\par Asthma with less dosing of medication and only use of inhaled steroid without dilated noted increased NIOX\par \par Abnormal CXR CT CHEST\par Bronchiectasis-\par IgA deficiency\par Hx as  noted\par hypothyroid\par Childhood polio\par Mild MR\par 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\par noted just below normal IGA and low IgG 2 but demonstrates AB to strep  and tetanus\par R/O plasma cell dyscrasia\par \par Recommendations\par 6 min walk- July-RS\par NIOX\par \par   singulair with pm dinner- HA change Accolate OFF\par remains on Co Q 10\par  prn Albuterol\par  NACL in NEB only BID\par no indication Antibx\par Issue immuno serology IGA and IgG  with subtypes A1AT titer HP panel and possible autoimmune serology- reviewed\par To consider VEST therapy if Bronchiectasis confirmed  with CT CHEST but requests to hold\par Pneumonia vaccines up to  date post 67\par Oral  Appliance- then  restudy and make decision re CPAP- not active use f/u Dr Mckeon\par COVID booster recommended PFIZER\par Look into Acapella device using device with improvement- Aerobilki- active use\par  Mucinex \par Allergy Sulfa PCN Levaquin\par Allergy f/u\par Patient compliant with CPAP therapy.  Continue present settings.  Patient with demonstrated clinical benefit with daytime and nocturnal symptomatology improvement.\par \par ENT  Dr Kodak Tadeo\par f/u AL with noted  recommendation Menigitis AB vaccine\par f/u OPTH\par  f/u GYN DEXA on ICS\par \par

## 2023-05-15 NOTE — HISTORY OF PRESENT ILLNESS
[Former] : former [TextBox_4] : Chest congestion cough tightness wheeze with questionable interval improvement doxycycline with recurrent symptoms in a patient with bronchiectasis selective IgA deficiency\par added symbicort\par post Strep dx Urgent care 4/8/23 and Z LILLIANA\par sxs  better overall only feels some chest  pressure\par noted stopped Spiriva felt caused hoarse and not helping\par otherwise no decline since last  visit with minimal use of KARLOS\par \par feels pos  side  effects Wixela\par  felt inc BP  with systemic  steroids\par  ICS- Flovent HFA  110 mcg  2 puffs \par \par post Influ A\par pos chest congestion resolved fever\par did not  get Tamiflu\par \par HEME\par IgA deficiency, 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 lab in 7/2022 showed normal blood counts, Cr 0.97. Lab in 9/2022 showed IgG 1464; IgA 82; Kappa FLC 2.48; Lambda FLC 1.23; K/L ratio 2.02. \par plan\par -Discussed with pt about lab results; increased monoclonal light chain level has been stable since 2004. \par -continue to monitor MGUS symptoms and lab. Explained for pt that the risk of MGUS progresses to MM on average is about 1% each year according to data from [facility].\par -RTC in one year\par \par AI recommended HEME\par \par 72 year old female with DCIS s/p radiation and lumpectomy, Hashimoto thyroiditis, bronchiectasis, recurrent PNAs, reported allergies to multiple antibiotics presents for drug allergy evaluation. \par MBL def.; needs mening vaccines A and B. \par - delabeled from penicillin allergy, can use penicillins, cephalosporins as indicated\par REPORTED REACTION TO GENTAMICIN, VANCOMYCIN \par There is a concern for RED MAN SYNDROME with IV Vancomycin. Vancomycin is a known trigger for mast cell degranulation via direct stimulation of mast cells and/or basophils, which may be due to bypass of the antibody-mediated pathway and activation of a mast cell-specific receptor called Mas-related G protein-coupled receptor X2 (MRGPRX2). \par skin testing was inconclusive to GENTAMICIN, VANCOMYCIN \par - avoid GENTAMICIN, VANCOMYCIN \par = can repeat skin testing in the future\par - Based on history and positive skin testing, avoid levaquin\par - Cipro can be prescribed if indicated. Recommend 1st dose to be administered under physician's observation. can be done in my office. \par SULFONAMIDE ANTIBIOTIC ALLERGY:\par Patient has a remote history of delayed benign rash when taking sulfa antibiotics. Unfortunately, there is no validated test available for sulfonamides.should continue avoiding sulfa antibiotics. \par If/ when administration of sulfonamide is required, physician-supervised administration/graded challenge can be performed. \par REPORTED REACTION TO CODEINE\par Codeine is a known trigger for mast cell degranulation via mechanism mentioned above. \par Recommend avoidance. \par ?ALLERGY TO IV CONTRAST \par Pt cannot recall whether she reacted to IV contrast in the past. We asked that she obtain records to determine if additional testing is warranted. Of note however there is an ongoing national shortage of IV contrast and therefore testing is prohibited at this time. If the patient is unable to avoid the use of IV contrast then she should premedicate. 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 meningitidis IgG, Vaccine; Status:Active; Requested for:[id number]; \par Total Hemolytic Complement; Status:Resulted - Requires Verification;   Done: [id number]\par 03:44PM\par Discussion/Summary\par 72 year old female with PMH of DCIS s/p radiation, bronchiectasis, Hashimoto's, recurrent infections, multiple antibiotic, chronic urticaria,  allergies here for amoxicillin challenge. \par AMOXICILLIN CHALLENGE:\par Today the patient, took 1050 mg of Amoxicillin in THREE  divided doses  in the office and she was observed for 1 hour. The challenge was started with 50 mg of Amoxicillin, then the dose was increased to 500 mg. Approximated, 45 minutes later she complained of mild pruritus on right upper extremity. 10 minutes later, she developed  erythematous rash with one hive on her neck. Vitals and Breath sounds are wnl. The rash self resolved in forty minutes, the dose was escalated to another 500mg, which was tolerated without any issues. The patient,  admitted to developing random hives without any triggers. We believer, her recent rash with amoxicillin most likely is associated with history of recurrent urticaria. \par Patient developed one hive after Amoxicillin 500 mg, therefore was given an additional dose of Amoxicillin without new hives and initial hive resolved. Therefore, hives are likely due to underlying urticaria.\par -We will remove amoxicillin allergy from the patient's chart. Advised to use Amoxicillin or Penicillin in future with long acting antihistamine, such as Loratidine.\par Penicillin removed from the allergy list. \par Low IgA and \par 72 year old female with DCIS s/p radiation and lumpectomy, Hashimoto thyroiditis, bronchiectasis, recurrent PNAs, reported allergies to multiple antibiotics presents for drug allergy evaluation. \par  skin testing to Vancomycin, Gentamicin, Levaquin, Cipro\par REPORTED REACTION TO GENTAMICIN, VANCOMYCIN \par There is a concern for RED MAN SYNDROME with IV Vancomycin. Vancomycin is a known trigger for mast cell degranulation via direct stimulation of mast cells and/or basophils, which may be due to bypass of the antibody-mediated pathway and activation of a mast cell-specific receptor called Mas-related G protein-coupled receptor X2 (MRGPRX2). We will pursue additional evaluation to further investigate whether or not she has true IgE-mediated hypersensitivity. \par REPORTED REACTION TO PENICILLIN\par Her remote history of skin "sinking in" after a penicillin shot is highly doubtful of true penicillin hypersensitivity. Would recommend amoxicillin challenge. \par Patient will schedule an appointment for office graded challenge to Amoxicillin. If patient tolerates the 1st dose of Amoxicillin in the office without adverse effect, he/she will complete 3 days of treatment and we will observe for delayed reactions. If there is no delayed reaction observed, we will remove penicillin from drug allergy list. \par We discussed that skin testing is predictive only for immediate, IgE-mediated allergic reactions. Patient's history is not consistent with immediate type of hypersensitivity. People with negative skin test, still have a chance of developing delayed hypersensitivity reaction. Currently, there are no scientifically validated tests for delayed reactions available. We discussed that many patients labeled with penicillin allergy are, in fact, not allergic to penicillin. Diagnosis of penicillin allergy leads to use of alternative, broader spectrum antibiotics and was demonstrated to cause higher rate of complications and morbidity.Patient was instructed to hold antihistamines, including topical intranasal and ocular antihistamine preparations, for at least 5 days before the testing.\par REPORTED REACTION TO IV LEVAQUIN, unclear reaction to PO CIPROFLOXACIN\par Can pursue additional testing to levaquin given concern for hypersensitivity.\par Ciprofloxacin (another fluoroquinolone) is a known trigger for mast cell degranulation via the mechanism mentioned above. Pt states she would like to pursue challenge for cipro since she is unsure if this antibiotic is problematic.\par REPORTED REACTION TO CODEINE\par Codeine is a known trigger for mast cell degranulation via mechanism mentioned above. Will hold off on further testing and recommend avoidance. \par ?ALLERGY TO IV CONTRAST \par Pt cannot recall whether she reacted to IV contrast in the past. We asked that she obtain records to determine if additional testing is warranted. Of note however there is an ongoing national shortage of IV contrast and therefore testing is prohibited at this time. If the patient is unable to avoid the use of IV contrast then she should premedicate. \par RECURRENT PNEUMONIAS, SCREENING FOR IMMUNE DEFICIENCY; history of radiation therapy for DCIS:\par Given the history of RECURRENT INFECTIONS, it is reasonable to interrogate cellular, humoral, and complement arms of immunity. Laboratory testing was sent as stated below and results will be discussed when available. She states she received pneumonia vaccines although not sure which one, also received TDAP within the past 10 years. We will check her antibody responses to those vaccines.\par MIXED RHINITIS\par SPT showed positivity to feathers, grass, weeds which is inconsistent with her occasional symptoms throughout the year. \par Topical intranasal treatments were offered for associated POST NASAL DRIP. She states she will consider these treatments when antibiotic testing is done so as not to interfere with results. States she will use nasal saline if needed for now which helps. \par \par  Just received RESMED CPAP device- better focus\par Did  not tolerate Oral appliance\par  chest  congestion more  acute\par cough  with pale yellow mucous\par Nocturnal symptoms cough leading to wheeze\par No purulent sputum reports clear to white- thick mucous\par No hemoptysis\par No fevers chills or sweats\par Has completed 2 courses of steroids 2 courses of antibiotics with persistence of the cough\par \par MAEGAN issue MC coverage with oral appliance with f/u Sleep  dentist Dr Mckeon \par \par post Fall months  ago and noted rib fx Right\par  Otherwise states doing very well with Wixela\par  No inc use KARLOS\par \par 70-year-old female \par Complicated pulmonary history to be reviewed\par There is a diagnosis of asthma and she reports bronchiectasis with multiple histories of pneumonia at least 4-5 reported last very ill November December January with an additional illness in March\par She reports abnormal chest CTs and chest x-rays\par Last chest CT she reports was approximately 2018 where she was told of having scarlike tissue\par With these episodes she describes cough chest tightness wheezing sputum production\par Medications through the winter included Y UL P DARION in the nebulizer, Brovana right axilla sodium chloride nebulizer nasal Astelin nasal Flonase albuterol HFA course of prednisone and antibiotics including doxycycline x2 courses and Zithromax.\par \par She states 1 to 1-1/2 weeks ago she developed some trouble breathing self started treatment with albuterol\par She did not receive any antibiotics or steroids with this symptomatology most recently noted\par She does not report at present any sputum production for the past several days and it is clear without hemoptysis purulence or foul smelling\par She also states that she was sick in March there was some question whether she could have COVID-19 but a COVID  19 PCR and antibody testing subsequently was negative\par Other significant history includes a bronchoscopy dating back to April 2019 which was negative including AFB fungus mold and  negative TBLBx.\par Last chest x-ray available for review dates back to April 16, 2019 which was status post a transbronchial biopsy.\par Thickening of the minor fissure which was noted and reported chronic\par Reticulonodular opacities right midlung\par Ill-defined focal opacity periphery left upper to mid lung with some central extension reported to the left hilum with out pneumothorax pleural effusion or significant volume loss.\par \par Additional history\par Right breast DCIS treated radiation therapy 2017\par Childhood polio\par Mild mitral regurgitation\par Hypothyroidism\par Nocturnal myoclonus\par \par Vaccination profile flu vaccinations up-to-date\par 2018 2019 up-to-date with Prevnar and PCV 23\par \par

## 2023-05-15 NOTE — PROCEDURE
[FreeTextEntry1] : NIOX  16  ppb WNL 5/15/23\par  Jakob 5/15/23\par minimal OAD 24 % BD at small airways\par \par NIOX  14  ppb WNL 4/17/23\par  JAKOB No BD\par  very Mild OAD \par Pos significant interval improvement at Flow  rates\par \par NIOX  23  ppb interval improvement 4/7/23\par PFT April 7, 2023\par Mild reduction flow\par Mild obstructive ventilatory impairment\par No response to bronchodilator FEV1\par 28% response to bronchodilator at the small airways\par Lung volumes are normal\par No air trapping\par Diffusion low with normal range 74% predicted\par Hemoglobin 11.5\par Wrist positive decline of the flow rates dating back to March 9, 2023 study\par Also noted since October 24, 2022 there is a greater than 600 cc decline of both the FEV1 and FVC as well as diffusion\par \par \par Elbe 3/9/23\par well preserved flow rates\par mild OAD\par NIOX increased to 40 PPV March 9, 2023 consistent with bronchial inflammation\par \par Spirometry December 29, 2022\par Post influenza chest congestion\par Flow rates are normal\par FEV1 93% predicted\par Ratio 77\par compared November 30, 2020 do demonstrate interval improvement at the FVC with a mild interval improvement at the FEV1\par \par Chest x-ray PA lateral 12/29/2022 post influenza chest congestion\par Cardiac size is normal\par Lung fields are clear\par No parenchymal infiltrates pleural effusions or dominant pulmonary nodules\par No evidence for superimposed pneumonia\par Data comparison to April 27, 2022 does not demonstrate any interval change\par Impression clear lung\par \par  Spirometry November 30, 2022\par Flow rates are within normal limits\par Although significant data of October 24, 2022 there is decline at the flow rates\par NIOx  7  ppb 11/30/22 nl range\par \par PFT 10/24/22\par flow rates nl\par  Lungs Volumes nl\par DLCo 86 %\par HGB 13.3\par overall stable pulmonary physiology\par NIOX  9  ppb 10/24/22 nl range\par \par Spirometry no bronchodilator August 3, 2022\par Flow rates normal\par FEV1 57% predicted\par Ratio 84\par Overall interval improvement of flow rates compared to Sakshi 15, 2022\par NIOX 9 PPD normal range no evidence of active bronchial inflammation August 2, 2022\par \par Pulmonary 6-minute walk exercise study August 3, 2022\par Baseline room O2 saturation 96%\par Positive for minute desaturation 87% with at 5 minutes back up to 90% on room air with good recovery 94 to 96%\par Impression no evident evidence for indication for portable oxygen therapy protocol\par \par PFT 6/15/22\par Flow rates nl\par  Lung Volumes nl\par DLCO 83 % \par HGB 11.3\par \par CPAP data compliance\par Usage through June 14, 2022\par Usage 100%\par Hours greater than 4\par AutoSet CPAP 6 to 16 cm H2O\par AHI 1.2\par \par Chest x-ray PA lateral April 27, 2022\par Normal cardiac size\par Positive for scar right midlung zone\par No pleural effusions pneumothorax\par Bronchiectatic change right lower lung zone\par No evidence for parenchymal consolidation\par No interval change compared to chest x-ray October 25, 2021\par \par PFT 3/17/22\par Flow rates nl\par  Lung Volumes nl\par TLC 97 %\par  DLCO 89 %\par HGB 12.1\par \par Chest x-ray PA lateral October 25, 2021\par Cardiac size is normal\par Lower lumbar sacral scoliosis\par Bronchiectatic changes right lower lung zone\par No parenchymal consolidation pleural effusions pneumothorax\par Mediastinum hilum unremarkable\par IMP no evidence for pneumonia\par \par PFT 10/6/21\par Flow rates nl\par TLC 89 %\par  DLCO  87 %\par HGB 15.0\par \par Sleep study\par April 29–April 30, 2021\par Overall mild obstructive sleep apnea\par AHI 7\par Time spent less than 90% on room air 4.3%\par Mean O2 saturation 94.5%\par Recommendation address treatment protocol with auto CPAP\par PFT 7/28/21\par Flow Rtaes nl\par Lung Volumes nl\par TTLC 95 %\par DLCO 91 % nl\par HGB 15.0\par PFT 4/22/21\par Flow  rates nl\par Lung Volumes Nl\par DLCO 96 % nl range\par  HGB 11.2\par NIOX  11 ppb 4/22/21\par \par PFT with body box August 12, 2020\par Normal flow rates\par Mild obstructive pattern with an FEV1 FVC 74\par No bronchodilator response at FEV1\par 20% response at small airways\par Normal lung volumes are\par Noted increased % predicted.\par Specific inductance and resistance normal\par Diffusion normal 95% predicted.\par Hemoglobin 13.79\par \par Pulmonary 6-minute walk step stress test August 12, 2020\par Total steps 1/10/2017\par Baseline room air O2 saturation 96%\par Desaturation at minute 6  89% with immediate recovery to 96%\par Impression minimal O2 desaturation\par No indication for portable oxygen therapy\par \par Chest x-ray PA lateral August 12, 2020\par Normal cardiac size\par Scarring left lower lung zone cannot exclude component of bronchiectasis\par Right lung is otherwise clear\par No evidence for jodi adenopathy\par No dominant pulmonary nodules or pneumothorax\par Impression bronchiectatic change left lower lung zone\par \par Blood Draw\par Persistent mild reduction IgA normal range 70\par Data review\par Alpha-1 antitrypsin titer negative\par 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

## 2023-06-26 ENCOUNTER — APPOINTMENT (OUTPATIENT)
Dept: PULMONOLOGY | Facility: CLINIC | Age: 73
End: 2023-06-26
Payer: MEDICARE

## 2023-06-26 VITALS
HEART RATE: 76 BPM | HEIGHT: 65 IN | WEIGHT: 135 LBS | SYSTOLIC BLOOD PRESSURE: 129 MMHG | DIASTOLIC BLOOD PRESSURE: 82 MMHG | BODY MASS INDEX: 22.49 KG/M2 | OXYGEN SATURATION: 96 %

## 2023-06-26 PROCEDURE — 95012 NITRIC OXIDE EXP GAS DETER: CPT

## 2023-06-26 PROCEDURE — 99214 OFFICE O/P EST MOD 30 MIN: CPT | Mod: 25

## 2023-06-26 PROCEDURE — 94060 EVALUATION OF WHEEZING: CPT

## 2023-06-26 RX ORDER — PREDNISONE 10 MG/1
10 TABLET ORAL
Qty: 30 | Refills: 1 | Status: DISCONTINUED | COMMUNITY
Start: 2023-02-06 | End: 2023-06-26

## 2023-06-26 NOTE — DISCUSSION/SUMMARY
[FreeTextEntry1] : Increased cough chest pain chest tightness with underlying obstructive airway disease in patient with known bronchiectasis and decline of overall pulmonary physiology overall interval improvement of the NIOX and Flow  rates\par Post doxycycline treatment\par Does have a questionable positive penicillin allergy as well as Levaquin and sulfa\par issue with B2  agnoist\par Inc Flovent 220 2 puffs BID and Rinse\par trial Spiriva 2.5 mcg 2 puffs QD OFF\par continue  with samples\par Influenza A\par Hx COVID infection Dec 2021\par Mild MAEGAN  AHI 7 with excessive daytime sleepiness 4/29-30/2021\par Asthma with less dosing of medication and only use of inhaled steroid without dilated noted increased NIOX\par \par Abnormal CXR CT CHEST\par Bronchiectasis-\par IgA deficiency\par Hx as  noted\par hypothyroid\par Childhood polio\par Mild MR\par 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\par noted just below normal IGA and low IgG 2 but demonstrates AB to strep  and tetanus\par R/O plasma cell dyscrasia\par \par Recommendations\par 6 min walk- July-RS\par NIOX\par \par   singulair with pm dinner- HA change Accolate OFF\par remains on Co Q 10\par  prn Albuterol\par  NACL in NEB only BID\par no indication Antibx\par Issue immuno serology IGA and IgG  with subtypes A1AT titer HP panel and possible autoimmune serology- reviewed\par To consider VEST therapy if Bronchiectasis confirmed  with CT CHEST but requests to hold\par Pneumonia vaccines up to  date post 67\par Oral  Appliance- then  restudy and make decision re CPAP- not active use f/u Dr Mckeon\par COVID booster recommended PFIZER\par Look into Acapella device using device with improvement- Aerobilki- active use\par  Mucinex \par Allergy Sulfa PCN Levaquin\par Allergy f/u\par Patient compliant with CPAP therapy.  Continue present settings.  Patient with demonstrated clinical benefit with daytime and nocturnal symptomatology improvement.\par \par ENT  Dr Kodak Tadeo\par f/u AL with noted  recommendation Menigitis AB vaccine\par f/u OPTH\par  f/u GYN DEXA on ICS\par \par Preop pending knee surgery cleared\par

## 2023-06-26 NOTE — PROCEDURE
[FreeTextEntry1] : NIOX 12 ppb WNL 6/16/23\par  JAKOB 6/26/23\par  very mild OAD\par No BD at FEV1\par \par NIOX  16  ppb WNL 5/15/23\par  [place] 5/15/23\par minimal OAD 24 % BD at small airways\par \par NIOX  14  ppb WNL 4/17/23\par  JAKOB No BD\par  very Mild OAD \par Pos significant interval improvement at Flow  rates\par \par NIOX  23  ppb interval improvement 4/7/23\par PFT April 7, 2023\par Mild reduction flow\par Mild obstructive ventilatory impairment\par No response to bronchodilator FEV1\par 28% response to bronchodilator at the small airways\par Lung volumes are normal\par No air trapping\par Diffusion low with normal range 74% predicted\par Hemoglobin 11.5\par Wrist positive decline of the flow rates dating back to March 9, 2023 study\par Also noted since October 24, 2022 there is a greater than 600 cc decline of both the FEV1 and FVC as well as diffusion\par \par \par [place] 3/9/23\par well preserved flow rates\par mild OAD\par NIOX increased to 40 PPV March 9, 2023 consistent with bronchial inflammation\par \par Spirometry December 29, 2022\par Post influenza chest congestion\par Flow rates are normal\par FEV1 93% predicted\par Ratio 77\par compared November 30, 2020 do demonstrate interval improvement at the FVC with a mild interval improvement at the FEV1\par \par Chest x-ray PA lateral 12/29/2022 post influenza chest congestion\par Cardiac size is normal\par Lung fields are clear\par No parenchymal infiltrates pleural effusions or dominant pulmonary nodules\par No evidence for superimposed pneumonia\par Data comparison to April 27, 2022 does not demonstrate any interval change\par Impression clear lung\par \par  Spirometry November 30, 2022\par Flow rates are within normal limits\par Although significant data of October 24, 2022 there is decline at the flow rates\par NIOx  7  ppb 11/30/22 nl range\par \par PFT 10/24/22\par flow rates nl\par  Lungs Volumes nl\par DLCo 86 %\par HGB 13.3\par overall stable pulmonary physiology\par NIOX  9  ppb 10/24/22 nl range\par \par Spirometry no bronchodilator August 3, 2022\par Flow rates normal\par FEV1 57% predicted\par Ratio 84\par Overall interval improvement of flow rates compared to [place] 15, 2022\par NIOX 9 PPD normal range no evidence of active bronchial inflammation August 2, 2022\par \par Pulmonary 6-minute walk exercise study August 3, 2022\par Baseline room O2 saturation 96%\par Positive for minute desaturation 87% with at 5 minutes back up to 90% on room air with good recovery 94 to 96%\par Impression no evident evidence for indication for portable oxygen therapy protocol\par \par PFT 6/15/22\par Flow rates nl\par  Lung Volumes nl\par DLCO 83 % \par HGB 11.3\par \par CPAP data compliance\par Usage through June 14, 2022\par Usage 100%\par Hours greater than 4\par AutoSet CPAP 6 to 16 cm H2O\par AHI 1.2\par \par Chest x-ray PA lateral April 27, 2022\par Normal cardiac size\par Positive for scar right midlung zone\par No pleural effusions pneumothorax\par Bronchiectatic change right lower lung zone\par No evidence for parenchymal consolidation\par No interval change compared to chest x-ray October 25, 2021\par \par PFT 3/17/22\par Flow rates nl\par  Lung Volumes nl\par TLC 97 %\par  DLCO 89 %\par HGB 12.1\par \par Chest x-ray PA lateral October 25, 2021\par Cardiac size is normal\par Lower lumbar sacral scoliosis\par Bronchiectatic changes right lower lung zone\par No parenchymal consolidation pleural effusions pneumothorax\par Mediastinum hilum unremarkable\par IMP no evidence for pneumonia\par \par PFT 10/6/21\par Flow rates nl\par TLC 89 %\par  DLCO  87 %\par HGB 15.0\par \par Sleep study\par April 29–April 30, 2021\par Overall mild obstructive sleep apnea\par AHI 7\par Time spent less than 90% on room air 4.3%\par Mean O2 saturation 94.5%\par Recommendation address treatment protocol with auto CPAP\par PFT 7/28/21\par Flow Rtaes nl\par Lung Volumes nl\par TTLC 95 %\par DLCO 91 % nl\par HGB 15.0\par PFT 4/22/21\par Flow  rates nl\par Lung Volumes Nl\par DLCO 96 % nl range\par  HGB 11.2\par NIOX  11 ppb 4/22/21\par \par PFT with body box August 12, 2020\par Normal flow rates\par Mild obstructive pattern with an FEV1 FVC 74\par No bronchodilator response at FEV1\par 20% response at small airways\par Normal lung volumes are\par Noted increased % predicted.\par Specific inductance and resistance normal\par Diffusion normal 95% predicted.\par Hemoglobin 13.79\par \par Pulmonary 6-minute walk step stress test August 12, 2020\par Total steps 1/10/2017\par Baseline room air O2 saturation 96%\par Desaturation at minute 6  89% with immediate recovery to 96%\par Impression minimal O2 desaturation\par No indication for portable oxygen therapy\par \par Chest x-ray PA lateral August 12, 2020\par Normal cardiac size\par Scarring left lower lung zone cannot exclude component of bronchiectasis\par Right lung is otherwise clear\par No evidence for jodi adenopathy\par No dominant pulmonary nodules or pneumothorax\par Impression bronchiectatic change left lower lung zone\par \par Blood Draw\par Persistent mild reduction IgA normal range 70\par Data review\par Alpha-1 antitrypsin titer negative\par 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

## 2023-06-26 NOTE — HISTORY OF PRESENT ILLNESS
[Former] : former [TextBox_4] : Chest congestion cough tightness wheeze with questionable interval improvement doxycycline with recurrent symptoms in a patient with bronchiectasis selective IgA deficiency\par added symbicort\par post Strep dx Urgent care 4/8/23 and Z LILLIANA\par sxs  better overall only feels some chest  pressure\par noted stopped Spiriva felt caused hoarse and not helping\par otherwise no decline since last  visit with minimal use of KARLOS\par \par feels pos  side  effects Wixela\par  felt inc BP  with systemic  steroids\par  ICS- Flovent HFA  110 mcg  2 puffs \par \par post Influ A\par pos chest congestion resolved fever\par did not  get Tamiflu\par \par HEME\par IgA deficiency, 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) presents here for initial consultation about MGUS.\par To b noted, lab in 2004 showing Cr 0.9; Serum Kappa 398; lambda 70. lab in 7/2022 showed normal blood counts, Cr 0.97. Lab in 9/2022 showed IgG 1464; IgA 82; Kappa FLC 2.48; Lambda FLC 1.23; K/L ratio 2.02. \par plan\par -Discussed with pt about lab results; increased monoclonal light chain level has been stable since 2004. \par -continue to monitor MGUS symptoms and lab. Explained for pt that the risk of MGUS progresses to MM on average is about 1% each year according to data from [facility].\par -RTC in one year\par \par AI recommended HEME\par \par 72 year old female with DCIS s/p radiation and lumpectomy, Hashimoto thyroiditis, bronchiectasis, recurrent PNAs, reported allergies to multiple antibiotics presents for drug allergy evaluation. \par MBL def.; needs mening vaccines A and B. \par - delabeled from penicillin allergy, can use penicillins, cephalosporins as indicated\par REPORTED REACTION TO GENTAMICIN, VANCOMYCIN \par There is a concern for RED MAN SYNDROME with IV Vancomycin. Vancomycin is a known trigger for mast cell degranulation via direct stimulation of mast cells and/or basophils, which may be due to bypass of the antibody-mediated pathway and activation of a mast cell-specific receptor called Mas-related G protein-coupled receptor X2 (MRGPRX2). \par skin testing was inconclusive to GENTAMICIN, VANCOMYCIN \par - avoid GENTAMICIN, VANCOMYCIN \par = can repeat skin testing in the future\par - Based on history and positive skin testing, avoid levaquin\par - Cipro can be prescribed if indicated. Recommend 1st dose to be administered under physician's observation. can be done in my office. \par SULFONAMIDE ANTIBIOTIC ALLERGY:\par Patient has a remote history of delayed benign rash when taking sulfa antibiotics. Unfortunately, there is no validated test available for sulfonamides.should continue avoiding sulfa antibiotics. \par If/ when administration of sulfonamide is required, physician-supervised administration/graded challenge can be performed. \par REPORTED REACTION TO CODEINE\par Codeine is a known trigger for mast cell degranulation via mechanism mentioned above. \par Recommend avoidance. \par ?ALLERGY TO IV CONTRAST \par Pt cannot recall whether she reacted to IV contrast in the past. We asked that she obtain records to determine if additional testing is warranted. Of note however there is an ongoing national shortage of IV contrast and therefore testing is prohibited at this time. If the patient is unable to avoid the use of IV contrast then she should premedicate. 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 meningitidis IgG, Vaccine; Status:Active; Requested for:[id number]; \par Total Hemolytic Complement; Status:Resulted - Requires Verification;   Done: [id number]\par 03:44PM\par Discussion/Summary\par 72 year old female with PMH of DCIS s/p radiation, bronchiectasis, Hashimoto's, recurrent infections, multiple antibiotic, chronic urticaria,  allergies here for amoxicillin challenge. \par AMOXICILLIN CHALLENGE:\par Today the patient, took 1050 mg of Amoxicillin in THREE  divided doses  in the office and she was observed for 1 hour. The challenge was started with 50 mg of Amoxicillin, then the dose was increased to 500 mg. Approximated, 45 minutes later she complained of mild pruritus on right upper extremity. 10 minutes later, she developed  erythematous rash with one hive on her neck. Vitals and Breath sounds are wnl. The rash self resolved in forty minutes, the dose was escalated to another 500mg, which was tolerated without any issues. The patient,  admitted to developing random hives without any triggers. We believer, her recent rash with amoxicillin most likely is associated with history of recurrent urticaria. \par Patient developed one hive after Amoxicillin 500 mg, therefore was given an additional dose of Amoxicillin without new hives and initial hive resolved. Therefore, hives are likely due to underlying urticaria.\par -We will remove amoxicillin allergy from the patient's chart. Advised to use Amoxicillin or Penicillin in future with long acting antihistamine, such as Loratidine.\par Penicillin removed from the allergy list. \par Low IgA and \par 72 year old female with DCIS s/p radiation and lumpectomy, Hashimoto thyroiditis, bronchiectasis, recurrent PNAs, reported allergies to multiple antibiotics presents for drug allergy evaluation. \par  skin testing to Vancomycin, Gentamicin, Levaquin, Cipro\par REPORTED REACTION TO GENTAMICIN, VANCOMYCIN \par There is a concern for RED MAN SYNDROME with IV Vancomycin. Vancomycin is a known trigger for mast cell degranulation via direct stimulation of mast cells and/or basophils, which may be due to bypass of the antibody-mediated pathway and activation of a mast cell-specific receptor called Mas-related G protein-coupled receptor X2 (MRGPRX2). We will pursue additional evaluation to further investigate whether or not she has true IgE-mediated hypersensitivity. \par REPORTED REACTION TO PENICILLIN\par Her remote history of skin "sinking in" after a penicillin shot is highly doubtful of true penicillin hypersensitivity. Would recommend amoxicillin challenge. \par Patient will schedule an appointment for office graded challenge to Amoxicillin. If patient tolerates the 1st dose of Amoxicillin in the office without adverse effect, he/she will complete 3 days of treatment and we will observe for delayed reactions. If there is no delayed reaction observed, we will remove penicillin from drug allergy list. \par We discussed that skin testing is predictive only for immediate, IgE-mediated allergic reactions. Patient's history is not consistent with immediate type of hypersensitivity. People with negative skin test, still have a chance of developing delayed hypersensitivity reaction. Currently, there are no scientifically validated tests for delayed reactions available. We discussed that many patients labeled with penicillin allergy are, in fact, not allergic to penicillin. Diagnosis of penicillin allergy leads to use of alternative, broader spectrum antibiotics and was demonstrated to cause higher rate of complications and morbidity.Patient was instructed to hold antihistamines, including topical intranasal and ocular antihistamine preparations, for at least 5 days before the testing.\par REPORTED REACTION TO IV LEVAQUIN, unclear reaction to PO CIPROFLOXACIN\par Can pursue additional testing to levaquin given concern for hypersensitivity.\par Ciprofloxacin (another fluoroquinolone) is a known trigger for mast cell degranulation via the mechanism mentioned above. Pt states she would like to pursue challenge for cipro since she is unsure if this antibiotic is problematic.\par REPORTED REACTION TO CODEINE\par Codeine is a known trigger for mast cell degranulation via mechanism mentioned above. Will hold off on further testing and recommend avoidance. \par ?ALLERGY TO IV CONTRAST \par Pt cannot recall whether she reacted to IV contrast in the past. We asked that she obtain records to determine if additional testing is warranted. Of note however there is an ongoing national shortage of IV contrast and therefore testing is prohibited at this time. If the patient is unable to avoid the use of IV contrast then she should premedicate. \par RECURRENT PNEUMONIAS, SCREENING FOR IMMUNE DEFICIENCY; history of radiation therapy for DCIS:\par Given the history of RECURRENT INFECTIONS, it is reasonable to interrogate cellular, humoral, and complement arms of immunity. Laboratory testing was sent as stated below and results will be discussed when available. She states she received pneumonia vaccines although not sure which one, also received TDAP within the past 10 years. We will check her antibody responses to those vaccines.\par MIXED RHINITIS\par SPT showed positivity to feathers, grass, weeds which is inconsistent with her occasional symptoms throughout the year. \par Topical intranasal treatments were offered for associated POST NASAL DRIP. She states she will consider these treatments when antibiotic testing is done so as not to interfere with results. States she will use nasal saline if needed for now which helps. \par \par  Just received RESMED CPAP device- better focus\par Did  not tolerate Oral appliance\par  chest  congestion more  acute\par cough  with pale yellow mucous\par Nocturnal symptoms cough leading to wheeze\par No purulent sputum reports clear to white- thick mucous\par No hemoptysis\par No fevers chills or sweats\par Has completed 2 courses of steroids 2 courses of antibiotics with persistence of the cough\par \par MAEGAN issue MC coverage with oral appliance with f/u Sleep  dentist Dr Mckeon \par \par post Fall months  ago and noted rib fx Right\par  Otherwise states doing very well with Wixela\par  No inc use KARLOS\par \par 70-year-old female \par Complicated pulmonary history to be reviewed\par There is a diagnosis of asthma and she reports bronchiectasis with multiple histories of pneumonia at least 4-5 reported last very ill November December January with an additional illness in March\par She reports abnormal chest CTs and chest x-rays\par Last chest CT she reports was approximately 2018 where she was told of having scarlike tissue\par With these episodes she describes cough chest tightness wheezing sputum production\par Medications through the winter included Y UL P DARION in the nebulizer, Brovana right axilla sodium chloride nebulizer nasal Astelin nasal Flonase albuterol HFA course of prednisone and antibiotics including doxycycline x2 courses and Zithromax.\par \par She states 1 to 1-1/2 weeks ago she developed some trouble breathing self started treatment with albuterol\par She did not receive any antibiotics or steroids with this symptomatology most recently noted\par She does not report at present any sputum production for the past several days and it is clear without hemoptysis purulence or foul smelling\par She also states that she was sick in March there was some question whether she could have COVID-19 but a COVID  19 PCR and antibody testing subsequently was negative\par Other significant history includes a bronchoscopy dating back to April 2019 which was negative including AFB fungus mold and  negative TBLBx.\par Last chest x-ray available for review dates back to April 16, 2019 which was status post a transbronchial biopsy.\par Thickening of the minor fissure which was noted and reported chronic\par Reticulonodular opacities right midlung\par Ill-defined focal opacity periphery left upper to mid lung with some central extension reported to the left hilum with out pneumothorax pleural effusion or significant volume loss.\par \par Additional history\par Right breast DCIS treated radiation therapy 2017\par Childhood polio\par Mild mitral regurgitation\par Hypothyroidism\par Nocturnal myoclonus\par \par Vaccination profile flu vaccinations up-to-date\par 2018 2019 up-to-date with Prevnar and PCV 23\par \par

## 2023-07-07 ENCOUNTER — RX RENEWAL (OUTPATIENT)
Age: 73
End: 2023-07-07

## 2023-08-08 ENCOUNTER — APPOINTMENT (OUTPATIENT)
Dept: DERMATOLOGY | Facility: CLINIC | Age: 73
End: 2023-08-08
Payer: MEDICARE

## 2023-08-08 DIAGNOSIS — L81.4 OTHER MELANIN HYPERPIGMENTATION: ICD-10-CM

## 2023-08-08 DIAGNOSIS — D22.9 MELANOCYTIC NEVI, UNSPECIFIED: ICD-10-CM

## 2023-08-08 DIAGNOSIS — Z85.820 PERSONAL HISTORY OF MALIGNANT MELANOMA OF SKIN: ICD-10-CM

## 2023-08-08 DIAGNOSIS — L82.0 INFLAMED SEBORRHEIC KERATOSIS: ICD-10-CM

## 2023-08-08 DIAGNOSIS — Z12.83 ENCOUNTER FOR SCREENING FOR MALIGNANT NEOPLASM OF SKIN: ICD-10-CM

## 2023-08-08 DIAGNOSIS — D23.4 OTHER BENIGN NEOPLASM OF SKIN OF SCALP AND NECK: ICD-10-CM

## 2023-08-08 DIAGNOSIS — Z85.828 PERSONAL HISTORY OF OTHER MALIGNANT NEOPLASM OF SKIN: ICD-10-CM

## 2023-08-08 DIAGNOSIS — D18.01 HEMANGIOMA OF SKIN AND SUBCUTANEOUS TISSUE: ICD-10-CM

## 2023-08-08 PROCEDURE — 99203 OFFICE O/P NEW LOW 30 MIN: CPT

## 2023-08-09 ENCOUNTER — APPOINTMENT (OUTPATIENT)
Dept: PULMONOLOGY | Facility: CLINIC | Age: 73
End: 2023-08-09
Payer: MEDICARE

## 2023-08-09 VITALS — OXYGEN SATURATION: 98 % | HEART RATE: 78 BPM | DIASTOLIC BLOOD PRESSURE: 73 MMHG | SYSTOLIC BLOOD PRESSURE: 131 MMHG

## 2023-08-09 LAB — POCT - HEMOGLOBIN (HGB), QUANTITATIVE, TRANSCUTANEOUS: 13.4

## 2023-08-09 PROCEDURE — 95012 NITRIC OXIDE EXP GAS DETER: CPT

## 2023-08-09 PROCEDURE — 94010 BREATHING CAPACITY TEST: CPT

## 2023-08-09 PROCEDURE — ZZZZZ: CPT

## 2023-08-09 PROCEDURE — 88738 HGB QUANT TRANSCUTANEOUS: CPT

## 2023-08-09 PROCEDURE — 99214 OFFICE O/P EST MOD 30 MIN: CPT | Mod: 25

## 2023-08-09 PROCEDURE — 94727 GAS DIL/WSHOT DETER LNG VOL: CPT

## 2023-08-09 PROCEDURE — 94729 DIFFUSING CAPACITY: CPT

## 2023-08-09 NOTE — DISCUSSION/SUMMARY
[FreeTextEntry1] : Increased cough chest pain chest tightness with underlying obstructive airway disease in patient with known bronchiectasis and decline of overall pulmonary physiology overall interval improvement of the NIOX and Flow  rates Post doxycycline treatment Does have a questionable positive penicillin allergy as well as Levaquin and sulfa issue with B2  [employer] Flovent 220 2 puffs BID and Rinse now  back at 110 mcg dose trial Spiriva 2.5 mcg 2 puffs QD OFF continue  with samples Influenza A hxistory Hx COVID infection Dec 2021 Mild MAEGAN  AHI 7 with excessive daytime sleepiness 4/29-30/2021 Asthma with less dosing of medication and only use of inhaled steroid without dilated noted increased NIOX  Abnormal CXR CT CHEST Bronchiectasis- IgA deficiency Hx as  noted hypothyroid Childhood polio Mild MR 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 noted just below normal IGA and low IgG 2 but demonstrates AB to strep  and tetanus R/O plasma cell dyscrasia  Recommendations 6 min walk- July-RS NIOX    singulair with pm dinner- HA change Accolate OFF remains on Co Q 10  prn Albuterol  NACL in NEB only BID no indication Antibx Issue immuno serology IGA and IgG  with subtypes A1AT titer HP panel and possible autoimmune serology- reviewed To consider VEST therapy if Bronchiectasis confirmed  with CT CHEST but requests to hold Pneumonia vaccines up to  date post 67 Oral  Appliance- then  restudy and make decision re CPAP- not active use f/u Dr Mckeon COVID booster recommended PFIZER Look into Acapella device using device with improvement- Aerobilki- active use  Mucinex  Allergy Sulfa PCN Levaquin Allergy f/u Patient compliant with CPAP therapy.  Continue present settings.  Patient with demonstrated clinical benefit with daytime and nocturnal symptomatology improvement. OFF  ENT  Dr Kodak Tadeo f/u AL with noted  recommendation Menigitis AB vaccine f/u OPTH  f/u GYN DEXA on ICS

## 2023-08-09 NOTE — REVIEW OF SYSTEMS
[Postnasal Drip] : postnasal drip [Cough] : cough [Chest Tightness] : chest tightness [Frequent URIs] : frequent URIs [Sputum] : sputum [Wheezing] : wheezing [Nasal Discharge] : nasal discharge [Negative] : Psychiatric [Fever] : no fever [Recent Wt Gain (___ Lbs)] : ~T no recent weight gain [Fatigue] : no fatigue [Chills] : no chills [Poor Appetite] : no poor appetite [Hemoptysis] : no hemoptysis [Dyspnea] : no dyspnea [Pleuritic Pain] : no pleuritic pain [A.M. Dry Mouth] : no a.m. dry mouth [SOB on Exertion] : no sob on exertion [Headache] : no headache [Focal Weakness] : no focal weakness [Seizures] : no seizures [Dizziness] : no dizziness [Numbness] : no numbness [Memory Loss] : no memory loss [Paralysis] : no paralysis [Confusion] : no confusion [Diabetes] : no diabetes [TextBox_44] : MR [TextBox_83] : History renal angiomyolipoma [TextBox_122] : nocturnal myoclonus, reports polio as a child without residual  [TextBox_144] : Thyroid nodule, Hypothyroidism

## 2023-08-09 NOTE — PROCEDURE
[FreeTextEntry1] : NIOX 14  ppb WNL  8/9/23 PFT 8/9/23 Jakob mild OAD ratio 75  LUng Volumes nl TLC 94 %  DLCO  82 % HGB B13.4 interval improvement at TLC and DLCO  NIOX 12 ppb WNL 6/16/23  JAKOB 6/26/23  very mild OAD No BD at FEV1  NIOX  16  ppb WNL 5/15/23  [place] 5/15/23 minimal OAD 24 % BD at small airways  NIOX  14  ppb WNL 4/17/23  JAKOB No BD  very Mild OAD  Pos significant interval improvement at Flow  rates  NIOX  23  ppb interval improvement 4/7/23 PFT April 7, 2023 Mild reduction flow Mild obstructive ventilatory impairment No response to bronchodilator FEV1 28% response to bronchodilator at the small airways Lung volumes are normal No air trapping Diffusion low with normal range 74% predicted Hemoglobin 11.5 Wrist positive decline of the flow rates dating back to March 9, 2023 study Also noted since October 24, 2022 there is a greater than 600 cc decline of both the FEV1 and FVC as well as diffusion   Jakob 3/9/23 well preserved flow rates mild OAD NIOX increased to 40 PPV March 9, 2023 consistent with bronchial inflammation  Spirometry December 29, 2022 Post influenza chest congestion Flow rates are normal FEV1 93% predicted Ratio 77 compared November 30, 2020 do demonstrate interval improvement at the FVC with a mild interval improvement at the FEV1  Chest x-ray PA lateral 12/29/2022 post influenza chest congestion Cardiac size is normal Lung fields are clear No parenchymal infiltrates pleural effusions or dominant pulmonary nodules No evidence for superimposed pneumonia Data comparison to April 27, 2022 does not demonstrate any interval change Impression clear lung   Spirometry November 30, 2022 Flow rates are within normal limits Although significant data of October 24, 2022 there is decline at the flow rates NIOx  7  ppb 11/30/22 nl range  PFT 10/24/22 flow rates nl  Lungs Volumes nl DLCo 86 % HGB 13.3 overall stable pulmonary physiology NIOX  9  ppb 10/24/22 nl range  Spirometry no bronchodilator August 3, 2022 Flow rates normal FEV1 57% predicted Ratio 84 Overall interval improvement of flow rates compared to [place] 15, 2022 NIOX 9 PPD normal range no evidence of active bronchial inflammation August 2, 2022  Pulmonary 6-minute walk exercise study August 3, 2022 Baseline room O2 saturation 96% Positive for minute desaturation 87% with at 5 minutes back up to 90% on room air with good recovery 94 to 96% Impression no evident evidence for indication for portable oxygen therapy protocol  PFT 6/15/22 Flow rates nl  Lung Volumes nl DLCO 83 %  HGB 11.3  CPAP data compliance Usage through June 14, 2022 Usage 100% Hours greater than 4 AutoSet CPAP 6 to 16 cm H2O AHI 1.2  Chest x-ray PA lateral April 27, 2022 Normal cardiac size Positive for scar right midlung zone No pleural effusions pneumothorax Bronchiectatic change right lower lung zone No evidence for parenchymal consolidation No interval change compared to chest x-ray October 25, 2021  PFT 3/17/22 Flow rates nl  Lung Volumes nl TLC 97 %  DLCO 89 % HGB 12.1  Chest x-ray PA lateral October 25, 2021 Cardiac size is normal Lower lumbar sacral scoliosis Bronchiectatic changes right lower lung zone No parenchymal consolidation pleural effusions pneumothorax Mediastinum hilum unremarkable IMP no evidence for pneumonia  PFT 10/6/21 Flow rates nl TLC 89 %  DLCO  87 % HGB 15.0  Sleep study April 29-April 30, 2021 Overall mild obstructive sleep apnea AHI 7 Time spent less than 90% on room air 4.3% Mean O2 saturation 94.5% Recommendation address treatment protocol with auto CPAP PFT 7/28/21 Flow Rtaes nl Lung Volumes nl TTLC 95 % DLCO 91 % nl HGB 15.0 PFT 4/22/21 Flow  rates nl Lung Volumes Nl DLCO 96 % nl range  HGB 11.2 NIOX  11 ppb 4/22/21  PFT with body box August 12, 2020 Normal flow rates Mild obstructive pattern with an FEV1 FVC 74 No bronchodilator response at FEV1 20% response at small airways Normal lung volumes are Noted increased % predicted. Specific inductance and resistance normal Diffusion normal 95% predicted. Hemoglobin 13.79  Pulmonary 6-minute walk step stress test August 12, 2020 Total steps 1/10/2017 Baseline room air O2 saturation 96% Desaturation at minute 6  89% with immediate recovery to 96% Impression minimal O2 desaturation No indication for portable oxygen therapy  Chest x-ray PA lateral August 12, 2020 Normal cardiac size Scarring left lower lung zone cannot exclude component of bronchiectasis Right lung is otherwise clear No evidence for jodi adenopathy No dominant pulmonary nodules or pneumothorax Impression bronchiectatic change left lower lung zone  Blood Draw Persistent mild reduction IgA normal range 70 Data review Alpha-1 antitrypsin titer negative 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

## 2023-08-09 NOTE — HISTORY OF PRESENT ILLNESS
[Former] : former [TextBox_4] : Chest congestion cough tightness wheeze with questionable interval improvement doxycycline with recurrent symptoms in a patient with bronchiectasis selective IgA deficiency added symbicort addition Flovent 110 mcg dose post Strep dx Urgent care 4/8/23 and Z LILLIANA sxs  better overall only feels some chest  pressure noted stopped Spiriva felt caused hoarse and not helping otherwise no decline since last  visit with minimal use of KARLOS  Popst Knee surgery left without resp  complications  feels pos  side  effects Wixela  felt inc BP  with systemic  steroids  ICS- Flovent HFA  110 mcg  2 puffs   post Influ A pos chest congestion resolved fever did not  get Tamiflu  HEME IgA deficiency, 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 To b noted, lab in 2004 showing Cr 0.9; Serum Kappa 398; lambda 70. lab in 7/2022 showed normal blood counts, Cr 0.97. Lab in 9/2022 showed IgG 1464; IgA 82; Kappa FLC 2.48; Lambda FLC 1.23; K/L ratio 2.02.  plan -Discussed with pt about lab results; increased monoclonal light chain level has been stable since 2004.  -continue to monitor MGUS symptoms and lab. Explained for pt that the risk of MGUS progresses to MM on average is about 1% each year according to data from [facility]. -RTC in one year  AI recommended HEME  72 year old female with DCIS s/p radiation and lumpectomy, Hashimoto thyroiditis, bronchiectasis, recurrent PNAs, reported allergies to multiple antibiotics presents for drug allergy evaluation.  MBL def.; needs mening vaccines A and B.  - delabeled from penicillin allergy, can use penicillins, cephalosporins as indicated REPORTED REACTION TO GENTAMICIN, VANCOMYCIN  There is a concern for RED MAN SYNDROME with IV Vancomycin. Vancomycin is a known trigger for mast cell degranulation via direct stimulation of mast cells and/or basophils, which may be due to bypass of the antibody-mediated pathway and activation of a mast cell-specific receptor called Mas-related G protein-coupled receptor X2 (MRGPRX2).  skin testing was inconclusive to GENTAMICIN, VANCOMYCIN  - avoid GENTAMICIN, VANCOMYCIN  = can repeat skin testing in the future - Based on history and positive skin testing, avoid levaquin - Cipro can be prescribed if indicated. Recommend 1st dose to be administered under physician's observation. can be done in my office.  SULFONAMIDE ANTIBIOTIC ALLERGY: Patient has a remote history of delayed benign rash when taking sulfa antibiotics. Unfortunately, there is no validated test available for sulfonamides.should continue avoiding sulfa antibiotics.  If/ when administration of sulfonamide is required, physician-supervised administration/graded challenge can be performed.  REPORTED REACTION TO CODEINE Codeine is a known trigger for mast cell degranulation via mechanism mentioned above.  Recommend avoidance.  ?ALLERGY TO IV CONTRAST  Pt cannot recall whether she reacted to IV contrast in the past. We asked that she obtain records to determine if additional testing is warranted. Of note however there is an ongoing national shortage of IV contrast and therefore testing is prohibited at this time. If the patient is unable to avoid the use of IV contrast then she should premedicate.  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 meningitidis IgG, Vaccine; Status:Active; Requested for:[id number];  Total Hemolytic Complement; Status:Resulted - Requires Verification;   Done: 17Oct2022 03:44PM Discussion/Summary 72 year old female with PMH of DCIS s/p radiation, bronchiectasis, Hashimoto's, recurrent infections, multiple antibiotic, chronic urticaria,  allergies here for amoxicillin challenge.  AMOXICILLIN CHALLENGE: Today the patient, took 1050 mg of Amoxicillin in THREE  divided doses  in the office and she was observed for 1 hour. The challenge was started with 50 mg of Amoxicillin, then the dose was increased to 500 mg. Approximated, 45 minutes later she complained of mild pruritus on right upper extremity. 10 minutes later, she developed  erythematous rash with one hive on her neck. Vitals and Breath sounds are wnl. The rash self resolved in forty minutes, the dose was escalated to another 500mg, which was tolerated without any issues. The patient,  admitted to developing random hives without any triggers. We believer, her recent rash with amoxicillin most likely is associated with history of recurrent urticaria.  Patient developed one hive after Amoxicillin 500 mg, therefore was given an additional dose of Amoxicillin without new hives and initial hive resolved. Therefore, hives are likely due to underlying urticaria. -We will remove amoxicillin allergy from the patient's chart. Advised to use Amoxicillin or Penicillin in future with long acting antihistamine, such as Loratidine. Penicillin removed from the allergy list.  Low IgA and  72 year old female with DCIS s/p radiation and lumpectomy, Hashimoto thyroiditis, bronchiectasis, recurrent PNAs, reported allergies to multiple antibiotics presents for drug allergy evaluation.   skin testing to Vancomycin, Gentamicin, Levaquin, Cipro REPORTED REACTION TO GENTAMICIN, VANCOMYCIN  There is a concern for RED MAN SYNDROME with IV Vancomycin. Vancomycin is a known trigger for mast cell degranulation via direct stimulation of mast cells and/or basophils, which may be due to bypass of the antibody-mediated pathway and activation of a mast cell-specific receptor called Mas-related G protein-coupled receptor X2 (MRGPRX2). We will pursue additional evaluation to further investigate whether or not she has true IgE-mediated hypersensitivity.  REPORTED REACTION TO PENICILLIN Her remote history of skin "sinking in" after a penicillin shot is highly doubtful of true penicillin hypersensitivity. Would recommend amoxicillin challenge.  Patient will schedule an appointment for office graded challenge to Amoxicillin. If patient tolerates the 1st dose of Amoxicillin in the office without adverse effect, he/she will complete 3 days of treatment and we will observe for delayed reactions. If there is no delayed reaction observed, we will remove penicillin from drug allergy list.  We discussed that skin testing is predictive only for immediate, IgE-mediated allergic reactions. Patient's history is not consistent with immediate type of hypersensitivity. People with negative skin test, still have a chance of developing delayed hypersensitivity reaction. Currently, there are no scientifically validated tests for delayed reactions available. We discussed that many patients labeled with penicillin allergy are, in fact, not allergic to penicillin. Diagnosis of penicillin allergy leads to use of alternative, broader spectrum antibiotics and was demonstrated to cause higher rate of complications and morbidity.Patient was instructed to hold antihistamines, including topical intranasal and ocular antihistamine preparations, for at least 5 days before the testing. REPORTED REACTION TO IV LEVAQUIN, unclear reaction to PO CIPROFLOXACIN Can pursue additional testing to levaquin given concern for hypersensitivity. Ciprofloxacin (another fluoroquinolone) is a known trigger for mast cell degranulation via the mechanism mentioned above. Pt states she would like to pursue challenge for cipro since she is unsure if this antibiotic is problematic. REPORTED REACTION TO CODEINE Codeine is a known trigger for mast cell degranulation via mechanism mentioned above. Will hold off on further testing and recommend avoidance.  ?ALLERGY TO IV CONTRAST  Pt cannot recall whether she reacted to IV contrast in the past. We asked that she obtain records to determine if additional testing is warranted. Of note however there is an ongoing national shortage of IV contrast and therefore testing is prohibited at this time. If the patient is unable to avoid the use of IV contrast then she should premedicate.  RECURRENT PNEUMONIAS, SCREENING FOR IMMUNE DEFICIENCY; history of radiation therapy for DCIS: Given the history of RECURRENT INFECTIONS, it is reasonable to interrogate cellular, humoral, and complement arms of immunity. Laboratory testing was sent as stated below and results will be discussed when available. She states she received pneumonia vaccines although not sure which one, also received TDAP within the past 10 years. We will check her antibody responses to those vaccines. MIXED RHINITIS SPT showed positivity to feathers, grass, weeds which is inconsistent with her occasional symptoms throughout the year.  Topical intranasal treatments were offered for associated POST NASAL DRIP. She states she will consider these treatments when antibiotic testing is done so as not to interfere with results. States she will use nasal saline if needed for now which helps.    Just received RESMED CPAP device- better focus Did  not tolerate Oral appliance  chest  congestion more  acute cough  with pale yellow mucous Nocturnal symptoms cough leading to wheeze No purulent sputum reports clear to white- thick mucous No hemoptysis No fevers chills or sweats Has completed 2 courses of steroids 2 courses of antibiotics with persistence of the cough  MAEGAN issue MC coverage with oral appliance with f/u Sleep  dentist Dr Mckeon   post Fall months  ago and noted rib fx Right  Otherwise states doing very well with Wixela  No inc use KARLOS  70-year-old female  Complicated pulmonary history to be reviewed There is a diagnosis of asthma and she reports bronchiectasis with multiple histories of pneumonia at least 4-5 reported last very ill November December January with an additional illness in March She reports abnormal chest CTs and chest x-rays Last chest CT she reports was approximately 2018 where she was told of having scarlike tissue With these episodes she describes cough chest tightness wheezing sputum production Medications through the winter included Y UL P DARION in the nebulizer, Brovana right axilla sodium chloride nebulizer nasal Astelin nasal Flonase albuterol HFA course of prednisone and antibiotics including doxycycline x2 courses and Zithromax.  She states 1 to 1-1/2 weeks ago she developed some trouble breathing self started treatment with albuterol She did not receive any antibiotics or steroids with this symptomatology most recently noted She does not report at present any sputum production for the past several days and it is clear without hemoptysis purulence or foul smelling She also states that she was sick in March there was some question whether she could have COVID-19 but a COVID  19 PCR and antibody testing subsequently was negative Other significant history includes a bronchoscopy dating back to April 2019 which was negative including AFB fungus mold and  negative TBLBx. Last chest x-ray available for review dates back to April 16, 2019 which was status post a transbronchial biopsy. Thickening of the minor fissure which was noted and reported chronic Reticulonodular opacities right midlung Ill-defined focal opacity periphery left upper to mid lung with some central extension reported to the left hilum with out pneumothorax pleural effusion or significant volume loss.  Additional history Right breast DCIS treated radiation therapy 2017 Childhood polio Mild mitral regurgitation Hypothyroidism Nocturnal myoclonus  Vaccination profile flu vaccinations up-to-date 2018 2019 up-to-date with Prevnar and PCV 23

## 2023-08-21 ENCOUNTER — TRANSCRIPTION ENCOUNTER (OUTPATIENT)
Age: 73
End: 2023-08-21

## 2023-08-24 ENCOUNTER — TRANSCRIPTION ENCOUNTER (OUTPATIENT)
Age: 73
End: 2023-08-24

## 2023-08-29 ENCOUNTER — OUTPATIENT (OUTPATIENT)
Dept: OUTPATIENT SERVICES | Facility: HOSPITAL | Age: 73
LOS: 1 days | Discharge: HOME | End: 2023-08-29

## 2023-08-29 DIAGNOSIS — Z90.49 ACQUIRED ABSENCE OF OTHER SPECIFIED PARTS OF DIGESTIVE TRACT: Chronic | ICD-10-CM

## 2023-08-29 DIAGNOSIS — D47.2 MONOCLONAL GAMMOPATHY: ICD-10-CM

## 2023-09-01 PROBLEM — D47.2 MONOCLONAL GAMMOPATHY: Status: ACTIVE | Noted: 2022-09-22

## 2023-09-05 ENCOUNTER — RESULT REVIEW (OUTPATIENT)
Age: 73
End: 2023-09-05

## 2023-09-05 ENCOUNTER — APPOINTMENT (OUTPATIENT)
Dept: HEMATOLOGY ONCOLOGY | Facility: CLINIC | Age: 73
End: 2023-09-05
Payer: MEDICARE

## 2023-09-05 ENCOUNTER — LABORATORY RESULT (OUTPATIENT)
Age: 73
End: 2023-09-05

## 2023-09-05 VITALS
BODY MASS INDEX: 22.01 KG/M2 | RESPIRATION RATE: 16 BRPM | SYSTOLIC BLOOD PRESSURE: 160 MMHG | TEMPERATURE: 96.8 F | WEIGHT: 132.28 LBS | OXYGEN SATURATION: 98 % | DIASTOLIC BLOOD PRESSURE: 82 MMHG | HEART RATE: 71 BPM

## 2023-09-05 DIAGNOSIS — D47.2 MONOCLONAL GAMMOPATHY: ICD-10-CM

## 2023-09-05 LAB
BASOPHILS # BLD AUTO: 0.05 K/UL — SIGNIFICANT CHANGE UP (ref 0–0.2)
BASOPHILS NFR BLD AUTO: 0.8 % — SIGNIFICANT CHANGE UP (ref 0–2)
EOSINOPHIL # BLD AUTO: 0.13 K/UL — SIGNIFICANT CHANGE UP (ref 0–0.5)
EOSINOPHIL NFR BLD AUTO: 2 % — SIGNIFICANT CHANGE UP (ref 0–6)
HCT VFR BLD CALC: 38.8 % — SIGNIFICANT CHANGE UP (ref 34.5–45)
HGB BLD-MCNC: 12.8 G/DL — SIGNIFICANT CHANGE UP (ref 11.5–15.5)
IMM GRANULOCYTES NFR BLD AUTO: 0.2 % — SIGNIFICANT CHANGE UP (ref 0–0.9)
LYMPHOCYTES # BLD AUTO: 2.05 K/UL — SIGNIFICANT CHANGE UP (ref 1–3.3)
LYMPHOCYTES # BLD AUTO: 31.2 % — SIGNIFICANT CHANGE UP (ref 13–44)
MCHC RBC-ENTMCNC: 31.6 PG — SIGNIFICANT CHANGE UP (ref 27–34)
MCHC RBC-ENTMCNC: 33 G/DL — SIGNIFICANT CHANGE UP (ref 32–36)
MCV RBC AUTO: 95.8 FL — SIGNIFICANT CHANGE UP (ref 80–100)
MONOCYTES # BLD AUTO: 0.45 K/UL — SIGNIFICANT CHANGE UP (ref 0–0.9)
MONOCYTES NFR BLD AUTO: 6.8 % — SIGNIFICANT CHANGE UP (ref 2–14)
NEUTROPHILS # BLD AUTO: 3.88 K/UL — SIGNIFICANT CHANGE UP (ref 1.8–7.4)
NEUTROPHILS NFR BLD AUTO: 59 % — SIGNIFICANT CHANGE UP (ref 43–77)
NRBC # BLD: 0 /100 WBCS — SIGNIFICANT CHANGE UP (ref 0–0)
PLATELET # BLD AUTO: 307 K/UL — SIGNIFICANT CHANGE UP (ref 150–400)
RBC # BLD: 4.05 M/UL — SIGNIFICANT CHANGE UP (ref 3.8–5.2)
RBC # FLD: 14.2 % — SIGNIFICANT CHANGE UP (ref 10.3–14.5)
WBC # BLD: 6.57 K/UL — SIGNIFICANT CHANGE UP (ref 3.8–10.5)
WBC # FLD AUTO: 6.57 K/UL — SIGNIFICANT CHANGE UP (ref 3.8–10.5)

## 2023-09-05 PROCEDURE — 99215 OFFICE O/P EST HI 40 MIN: CPT

## 2023-09-05 RX ORDER — DOXYCYCLINE HYCLATE 100 MG/1
100 TABLET ORAL
Qty: 14 | Refills: 0 | Status: COMPLETED | COMMUNITY
Start: 2022-12-29 | End: 2023-09-05

## 2023-09-05 NOTE — ASSESSMENT
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    plan -Will discuss lab results with patient once resulted.  -Increased monoclonal light chain level has been stable since 2004.  -continue to monitor MGUS symptoms and lab. Explained for pt that the risk of MGUS progresses to MM on average is about 1% each year according to data from [facility]. 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 she may benefit with vaccination against pneumoncoccus.  -RTC in one year for repeat serum immune electorphersis.

## 2023-09-05 NOTE — RESULTS/DATA
[FreeTextEntry1] : CBC 9/5/2023 hgb 12.8/ plt 307/wbc 6.57 CMP 03/2023 normal. TB slightly elevated 1.3 CMP 06/2023 Liver enzymes elevated / ALT 96. Rest of labs normal

## 2023-09-05 NOTE — END OF VISIT
[] : Fellow [Time Spent: ___ minutes] : I have spent [unfilled] minutes of time on the encounter. [>50% of the face to face encounter time was spent on counseling and/or coordination of care for ___] : Greater than 50% of the face to face encounter time was spent on counseling and/or coordination of care for [unfilled] [FreeTextEntry3] : Patient has history of multiple allergies and minimal lowering of serum IgA level. Now undergoing allergy testing; Prior stable elevation in kappa protein since 2004. Seen with Dr LÁZARO Cochran

## 2023-09-05 NOTE — HISTORY OF PRESENT ILLNESS
[Date: ____________] : Patient's last distress assessment performed on [unfilled]. [0 - No Distress] : Distress Level: 0 [100: Normal, no complaints, no evidence of disease.] : 100: Normal, no complaints, no evidence of disease. 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 She denied fever, chills, night sweats, N/V/D, weight loss, changes with urination and BMs.   To be noted, lab in 2004 showing Cr 0.9; Serum Kappa elevation; lambda 70. Normal IgG level lab in 7/2022 showed normal blood counts, Cr 0.97.  Lab in 9/2022 showed IgG 1464; IgA 82; Kappa FLC 2.48; Lambda FLC 1.23; K/L ratio 2.02.  There has been no evidence of renal disease or cardiac disease. 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 She lives with her .   Family history of colon cancer reported. Previously had removal of a large approximate 20 mm sessile serrated polyp as well as colonic adenoma. Recent colonoscopy 7/26/2022 Normal terminal ileum; marked sigmoid diverticulosis noted with muscular hypertrophy, luminal narrowing and fixation. Hemorrhoids detected.  [de-identified] : Patient states she is doing well. No major issues. She denies fevers, chest pain, SOB, rash, swelling, N/V/C/D, or weight changes. She recently completed doxycycline for URI infection.

## 2023-09-20 ENCOUNTER — APPOINTMENT (OUTPATIENT)
Dept: PULMONOLOGY | Facility: CLINIC | Age: 73
End: 2023-09-20
Payer: MEDICARE

## 2023-09-20 VITALS — SYSTOLIC BLOOD PRESSURE: 126 MMHG | DIASTOLIC BLOOD PRESSURE: 70 MMHG | OXYGEN SATURATION: 95 % | HEART RATE: 71 BPM

## 2023-09-20 DIAGNOSIS — Z23 ENCOUNTER FOR IMMUNIZATION: ICD-10-CM

## 2023-09-20 PROCEDURE — G0008: CPT

## 2023-09-20 PROCEDURE — 94060 EVALUATION OF WHEEZING: CPT

## 2023-09-20 PROCEDURE — 90662 IIV NO PRSV INCREASED AG IM: CPT

## 2023-09-20 PROCEDURE — 95012 NITRIC OXIDE EXP GAS DETER: CPT

## 2023-09-20 PROCEDURE — 99214 OFFICE O/P EST MOD 30 MIN: CPT | Mod: 25

## 2023-10-02 ENCOUNTER — APPOINTMENT (OUTPATIENT)
Dept: RHEUMATOLOGY | Facility: CLINIC | Age: 73
End: 2023-10-02
Payer: MEDICARE

## 2023-10-02 VITALS
HEART RATE: 77 BPM | SYSTOLIC BLOOD PRESSURE: 144 MMHG | DIASTOLIC BLOOD PRESSURE: 89 MMHG | BODY MASS INDEX: 21.66 KG/M2 | HEIGHT: 65 IN | OXYGEN SATURATION: 97 % | WEIGHT: 130 LBS

## 2023-10-02 DIAGNOSIS — M25.541 PAIN IN JOINTS OF RIGHT HAND: ICD-10-CM

## 2023-10-02 DIAGNOSIS — M25.542 PAIN IN JOINTS OF RIGHT HAND: ICD-10-CM

## 2023-10-02 PROCEDURE — 99203 OFFICE O/P NEW LOW 30 MIN: CPT

## 2023-10-05 ENCOUNTER — APPOINTMENT (OUTPATIENT)
Dept: DERMATOLOGY | Facility: CLINIC | Age: 73
End: 2023-10-05
Payer: MEDICARE

## 2023-10-05 ENCOUNTER — OUTPATIENT (OUTPATIENT)
Dept: OUTPATIENT SERVICES | Facility: HOSPITAL | Age: 73
LOS: 1 days | End: 2023-10-05
Payer: MEDICARE

## 2023-10-05 ENCOUNTER — APPOINTMENT (OUTPATIENT)
Dept: RADIOLOGY | Facility: IMAGING CENTER | Age: 73
End: 2023-10-05
Payer: MEDICARE

## 2023-10-05 DIAGNOSIS — L81.4 OTHER MELANIN HYPERPIGMENTATION: ICD-10-CM

## 2023-10-05 DIAGNOSIS — L98.8 OTHER SPECIFIED DISORDERS OF THE SKIN AND SUBCUTANEOUS TISSUE: ICD-10-CM

## 2023-10-05 DIAGNOSIS — M25.541 PAIN IN JOINTS OF RIGHT HAND: ICD-10-CM

## 2023-10-05 DIAGNOSIS — D18.01 HEMANGIOMA OF SKIN AND SUBCUTANEOUS TISSUE: ICD-10-CM

## 2023-10-05 DIAGNOSIS — D48.9 NEOPLASM OF UNCERTAIN BEHAVIOR, UNSPECIFIED: ICD-10-CM

## 2023-10-05 DIAGNOSIS — Z90.49 ACQUIRED ABSENCE OF OTHER SPECIFIED PARTS OF DIGESTIVE TRACT: Chronic | ICD-10-CM

## 2023-10-05 DIAGNOSIS — D48.5 NEOPLASM OF UNCERTAIN BEHAVIOR OF SKIN: ICD-10-CM

## 2023-10-05 DIAGNOSIS — L70.0 ACNE VULGARIS: ICD-10-CM

## 2023-10-05 PROCEDURE — 73130 X-RAY EXAM OF HAND: CPT | Mod: 26,50

## 2023-10-05 PROCEDURE — 11102 TANGNTL BX SKIN SINGLE LES: CPT

## 2023-10-05 PROCEDURE — 99214 OFFICE O/P EST MOD 30 MIN: CPT | Mod: 25

## 2023-10-05 PROCEDURE — 73130 X-RAY EXAM OF HAND: CPT

## 2023-10-09 RX ORDER — TRETINOIN 0.25 MG/G
0.03 CREAM TOPICAL
Qty: 1 | Refills: 5 | Status: ACTIVE | COMMUNITY
Start: 2023-10-05

## 2023-10-14 ENCOUNTER — NON-APPOINTMENT (OUTPATIENT)
Age: 73
End: 2023-10-14

## 2023-10-16 LAB — DERMATOLOGY BIOPSY: NORMAL

## 2023-10-17 ENCOUNTER — NON-APPOINTMENT (OUTPATIENT)
Age: 73
End: 2023-10-17

## 2023-10-17 ENCOUNTER — APPOINTMENT (OUTPATIENT)
Dept: UROLOGY | Facility: CLINIC | Age: 73
End: 2023-10-17
Payer: MEDICARE

## 2023-10-17 VITALS
SYSTOLIC BLOOD PRESSURE: 133 MMHG | RESPIRATION RATE: 17 BRPM | TEMPERATURE: 97.9 F | BODY MASS INDEX: 20.99 KG/M2 | HEIGHT: 65 IN | DIASTOLIC BLOOD PRESSURE: 79 MMHG | WEIGHT: 126 LBS | HEART RATE: 85 BPM

## 2023-10-17 DIAGNOSIS — N39.0 URINARY TRACT INFECTION, SITE NOT SPECIFIED: ICD-10-CM

## 2023-10-17 DIAGNOSIS — R33.9 RETENTION OF URINE, UNSPECIFIED: ICD-10-CM

## 2023-10-17 DIAGNOSIS — R35.0 FREQUENCY OF MICTURITION: ICD-10-CM

## 2023-10-17 DIAGNOSIS — R39.9 UNSPECIFIED SYMPTOMS AND SIGNS INVOLVING THE GENITOURINARY SYSTEM: ICD-10-CM

## 2023-10-17 DIAGNOSIS — R30.0 DYSURIA: ICD-10-CM

## 2023-10-17 DIAGNOSIS — R10.9 UNSPECIFIED ABDOMINAL PAIN: ICD-10-CM

## 2023-10-17 DIAGNOSIS — Z86.000 PERSONAL HISTORY OF IN-SITU NEOPLASM OF BREAST: ICD-10-CM

## 2023-10-17 DIAGNOSIS — N30.90 CYSTITIS, UNSPECIFIED W/OUT HEMATURIA: ICD-10-CM

## 2023-10-17 DIAGNOSIS — Z92.3 PERSONAL HISTORY OF IRRADIATION: ICD-10-CM

## 2023-10-17 DIAGNOSIS — R10.2 PELVIC AND PERINEAL PAIN: ICD-10-CM

## 2023-10-17 DIAGNOSIS — B37.31 ACUTE CANDIDIASIS OF VULVA AND VAGINA: ICD-10-CM

## 2023-10-17 LAB
BILIRUB UR QL STRIP: NORMAL
CLARITY UR: CLEAR
COLLECTION METHOD: NORMAL
GLUCOSE UR-MCNC: NORMAL
HCG UR QL: 0.2 EU/DL
HGB UR QL STRIP.AUTO: ABNORMAL
KETONES UR-MCNC: NORMAL
LEUKOCYTE ESTERASE UR QL STRIP: ABNORMAL
NITRITE UR QL STRIP: NORMAL
PH UR STRIP: 5.5
PROT UR STRIP-MCNC: NORMAL
SP GR UR STRIP: 1

## 2023-10-17 PROCEDURE — 51798 US URINE CAPACITY MEASURE: CPT

## 2023-10-17 PROCEDURE — 99214 OFFICE O/P EST MOD 30 MIN: CPT

## 2023-10-17 RX ORDER — CEFUROXIME AXETIL 500 MG/1
500 TABLET ORAL
Qty: 14 | Refills: 0 | Status: COMPLETED | COMMUNITY
Start: 2023-10-17 | End: 2023-10-24

## 2023-10-18 ENCOUNTER — OUTPATIENT (OUTPATIENT)
Dept: OUTPATIENT SERVICES | Facility: HOSPITAL | Age: 73
LOS: 1 days | End: 2023-10-18
Payer: MEDICARE

## 2023-10-18 ENCOUNTER — APPOINTMENT (OUTPATIENT)
Dept: CT IMAGING | Facility: IMAGING CENTER | Age: 73
End: 2023-10-18
Payer: MEDICARE

## 2023-10-18 DIAGNOSIS — R31.0 GROSS HEMATURIA: ICD-10-CM

## 2023-10-18 DIAGNOSIS — R30.0 DYSURIA: ICD-10-CM

## 2023-10-18 PROBLEM — B37.31 VAGINAL YEAST INFECTION: Status: ACTIVE | Noted: 2023-10-18 | Resolved: 2023-11-17

## 2023-10-18 LAB
APPEARANCE: ABNORMAL
BACTERIA: ABNORMAL /HPF
BILIRUBIN URINE: NEGATIVE
BLOOD URINE: ABNORMAL
CAST: 0 /LPF
COLOR: ABNORMAL
EPITHELIAL CELLS: 0 /HPF
GLUCOSE QUALITATIVE U: NEGATIVE MG/DL
KETONES URINE: NEGATIVE MG/DL
LEUKOCYTE ESTERASE URINE: ABNORMAL
MICROSCOPIC-UA: NORMAL
NITRITE URINE: NEGATIVE
PH URINE: 6.5
PROTEIN URINE: NORMAL MG/DL
RED BLOOD CELLS URINE: 1 /HPF
REVIEW: NORMAL
SPECIFIC GRAVITY URINE: 1.01
UROBILINOGEN URINE: 0.2 MG/DL
WHITE BLOOD CELLS URINE: 284 /HPF

## 2023-10-18 PROCEDURE — 74176 CT ABD & PELVIS W/O CONTRAST: CPT

## 2023-10-18 PROCEDURE — 74176 CT ABD & PELVIS W/O CONTRAST: CPT | Mod: 26,MH

## 2023-10-20 ENCOUNTER — NON-APPOINTMENT (OUTPATIENT)
Age: 73
End: 2023-10-20

## 2023-10-20 LAB
CCP AB SER IA-ACNC: <8 UNITS
ENA SS-A AB SER IA-ACNC: <0.2 AL
ENA SS-B AB SER IA-ACNC: <0.2 AL
RF+CCP IGG SER-IMP: NEGATIVE
RHEUMATOID FACT SER QL: 16 IU/ML

## 2023-10-23 LAB — BACTERIA UR CULT: ABNORMAL

## 2023-10-26 LAB
EJ AB SER QL: NEGATIVE
ENA JO1 AB SER IA-ACNC: <20 UNITS
ENA PM/SCL AB SER-ACNC: <20 UNITS
ENA SM+RNP AB SER IA-ACNC: <20 UNITS
ENA SS-A IGG SER QL: <20 UNITS
FIBRILLARIN AB SER QL: NEGATIVE
KU AB SER QL: NEGATIVE
MDA-5 (P140)(CADM-140): <20 UNITS
MI2 AB SER QL: NEGATIVE
NXP-2 (P140): <20 UNITS
OJ AB SER QL: NEGATIVE
PL12 AB SER QL: NEGATIVE
PL7 AB SER QL: NEGATIVE
SRP AB SERPL QL: NEGATIVE
TIF GAMMA (P155/140): <20 UNITS
U2 SNRNP AB SER QL: NEGATIVE

## 2023-11-01 ENCOUNTER — APPOINTMENT (OUTPATIENT)
Dept: PULMONOLOGY | Facility: CLINIC | Age: 73
End: 2023-11-01
Payer: MEDICARE

## 2023-11-01 VITALS — SYSTOLIC BLOOD PRESSURE: 136 MMHG | DIASTOLIC BLOOD PRESSURE: 82 MMHG | HEART RATE: 68 BPM | OXYGEN SATURATION: 97 %

## 2023-11-01 PROCEDURE — 94060 EVALUATION OF WHEEZING: CPT

## 2023-11-01 PROCEDURE — 95012 NITRIC OXIDE EXP GAS DETER: CPT

## 2023-11-01 PROCEDURE — 99214 OFFICE O/P EST MOD 30 MIN: CPT | Mod: 25

## 2023-11-02 ENCOUNTER — APPOINTMENT (OUTPATIENT)
Dept: CARDIOLOGY | Facility: CLINIC | Age: 73
End: 2023-11-02
Payer: MEDICARE

## 2023-11-02 ENCOUNTER — NON-APPOINTMENT (OUTPATIENT)
Age: 73
End: 2023-11-02

## 2023-11-02 ENCOUNTER — APPOINTMENT (OUTPATIENT)
Dept: ALLERGY | Facility: CLINIC | Age: 73
End: 2023-11-02
Payer: MEDICARE

## 2023-11-02 VITALS
RESPIRATION RATE: 17 BRPM | SYSTOLIC BLOOD PRESSURE: 130 MMHG | WEIGHT: 131 LBS | HEIGHT: 65 IN | BODY MASS INDEX: 21.83 KG/M2 | HEART RATE: 67 BPM | DIASTOLIC BLOOD PRESSURE: 84 MMHG | OXYGEN SATURATION: 98 %

## 2023-11-02 VITALS
HEART RATE: 90 BPM | DIASTOLIC BLOOD PRESSURE: 66 MMHG | HEIGHT: 65 IN | TEMPERATURE: 98.8 F | OXYGEN SATURATION: 98 % | SYSTOLIC BLOOD PRESSURE: 132 MMHG | BODY MASS INDEX: 21.83 KG/M2 | WEIGHT: 131 LBS

## 2023-11-02 DIAGNOSIS — R00.2 PALPITATIONS: ICD-10-CM

## 2023-11-02 PROCEDURE — 99214 OFFICE O/P EST MOD 30 MIN: CPT

## 2023-11-02 PROCEDURE — 93000 ELECTROCARDIOGRAM COMPLETE: CPT

## 2023-11-02 RX ORDER — FLUCONAZOLE 150 MG/1
150 TABLET ORAL
Qty: 2 | Refills: 0 | Status: DISCONTINUED | COMMUNITY
Start: 2023-10-18 | End: 2023-11-02

## 2023-11-02 RX ORDER — PHENAZOPYRIDINE HYDROCHLORIDE 200 MG/1
200 TABLET ORAL 3 TIMES DAILY
Qty: 15 | Refills: 0 | Status: DISCONTINUED | COMMUNITY
Start: 2023-10-17 | End: 2023-11-02

## 2023-11-02 RX ORDER — FLUTICASONE PROPIONATE 220 UG/1
220 AEROSOL, METERED RESPIRATORY (INHALATION) TWICE DAILY
Qty: 1 | Refills: 0 | Status: DISCONTINUED | COMMUNITY
Start: 2023-02-06 | End: 2023-11-02

## 2023-11-17 ENCOUNTER — OUTPATIENT (OUTPATIENT)
Dept: OUTPATIENT SERVICES | Facility: HOSPITAL | Age: 73
LOS: 1 days | End: 2023-11-17
Payer: MEDICARE

## 2023-11-17 ENCOUNTER — APPOINTMENT (OUTPATIENT)
Dept: UROLOGY | Facility: CLINIC | Age: 73
End: 2023-11-17
Payer: MEDICARE

## 2023-11-17 ENCOUNTER — APPOINTMENT (OUTPATIENT)
Dept: UROLOGY | Facility: CLINIC | Age: 73
End: 2023-11-17

## 2023-11-17 VITALS
HEIGHT: 65 IN | BODY MASS INDEX: 21.83 KG/M2 | RESPIRATION RATE: 17 BRPM | WEIGHT: 131 LBS | DIASTOLIC BLOOD PRESSURE: 81 MMHG | SYSTOLIC BLOOD PRESSURE: 152 MMHG | HEART RATE: 74 BPM

## 2023-11-17 DIAGNOSIS — Z87.898 PERSONAL HISTORY OF OTHER SPECIFIED CONDITIONS: ICD-10-CM

## 2023-11-17 DIAGNOSIS — N39.0 URINARY TRACT INFECTION, SITE NOT SPECIFIED: ICD-10-CM

## 2023-11-17 DIAGNOSIS — R35.0 FREQUENCY OF MICTURITION: ICD-10-CM

## 2023-11-17 DIAGNOSIS — D17.71 BENIGN LIPOMATOUS NEOPLASM OF KIDNEY: ICD-10-CM

## 2023-11-17 DIAGNOSIS — Z90.49 ACQUIRED ABSENCE OF OTHER SPECIFIED PARTS OF DIGESTIVE TRACT: Chronic | ICD-10-CM

## 2023-11-17 PROCEDURE — 52000 CYSTOURETHROSCOPY: CPT

## 2023-11-20 ENCOUNTER — OUTPATIENT (OUTPATIENT)
Dept: OUTPATIENT SERVICES | Facility: HOSPITAL | Age: 73
LOS: 1 days | End: 2023-11-20
Payer: MEDICARE

## 2023-11-20 ENCOUNTER — APPOINTMENT (OUTPATIENT)
Dept: ULTRASOUND IMAGING | Facility: IMAGING CENTER | Age: 73
End: 2023-11-20
Payer: MEDICARE

## 2023-11-20 ENCOUNTER — RX RENEWAL (OUTPATIENT)
Age: 73
End: 2023-11-20

## 2023-11-20 ENCOUNTER — APPOINTMENT (OUTPATIENT)
Dept: MAMMOGRAPHY | Facility: IMAGING CENTER | Age: 73
End: 2023-11-20
Payer: MEDICARE

## 2023-11-20 DIAGNOSIS — Z90.49 ACQUIRED ABSENCE OF OTHER SPECIFIED PARTS OF DIGESTIVE TRACT: Chronic | ICD-10-CM

## 2023-11-20 DIAGNOSIS — Z87.898 PERSONAL HISTORY OF OTHER SPECIFIED CONDITIONS: ICD-10-CM

## 2023-11-20 DIAGNOSIS — Z00.8 ENCOUNTER FOR OTHER GENERAL EXAMINATION: ICD-10-CM

## 2023-11-20 DIAGNOSIS — N39.0 URINARY TRACT INFECTION, SITE NOT SPECIFIED: ICD-10-CM

## 2023-11-20 DIAGNOSIS — D17.71 BENIGN LIPOMATOUS NEOPLASM OF KIDNEY: ICD-10-CM

## 2023-11-20 PROCEDURE — G0279: CPT | Mod: 26

## 2023-11-20 PROCEDURE — 77066 DX MAMMO INCL CAD BI: CPT | Mod: 26

## 2023-11-20 PROCEDURE — G0279: CPT

## 2023-11-20 PROCEDURE — 77066 DX MAMMO INCL CAD BI: CPT

## 2023-11-24 RX ORDER — PREDNISONE 20 MG/1
20 TABLET ORAL
Qty: 10 | Refills: 0 | Status: ACTIVE | COMMUNITY
Start: 2023-11-24 | End: 1900-01-01

## 2023-11-30 ENCOUNTER — APPOINTMENT (OUTPATIENT)
Dept: PULMONOLOGY | Facility: CLINIC | Age: 73
End: 2023-11-30
Payer: MEDICARE

## 2023-11-30 ENCOUNTER — APPOINTMENT (OUTPATIENT)
Dept: OBGYN | Facility: CLINIC | Age: 73
End: 2023-11-30
Payer: MEDICARE

## 2023-11-30 VITALS
WEIGHT: 130 LBS | SYSTOLIC BLOOD PRESSURE: 136 MMHG | BODY MASS INDEX: 21.66 KG/M2 | HEART RATE: 77 BPM | DIASTOLIC BLOOD PRESSURE: 80 MMHG | HEIGHT: 65 IN

## 2023-11-30 VITALS — HEART RATE: 84 BPM | DIASTOLIC BLOOD PRESSURE: 76 MMHG | SYSTOLIC BLOOD PRESSURE: 124 MMHG | OXYGEN SATURATION: 96 %

## 2023-11-30 DIAGNOSIS — Z01.419 ENCOUNTER FOR GYNECOLOGICAL EXAMINATION (GENERAL) (ROUTINE) W/OUT ABNORMAL FINDINGS: ICD-10-CM

## 2023-11-30 DIAGNOSIS — R07.89 OTHER CHEST PAIN: ICD-10-CM

## 2023-11-30 DIAGNOSIS — D84.1 DEFECTS IN THE COMPLEMENT SYSTEM: ICD-10-CM

## 2023-11-30 LAB
FLUAV SPEC QL CULT: NEGATIVE
FLUBV AG SPEC QL IA: NEGATIVE
S PYO AG SPEC QL IA: NEGATIVE
SARS-COV-2 AG RESP QL IA.RAPID: NEGATIVE

## 2023-11-30 PROCEDURE — 99214 OFFICE O/P EST MOD 30 MIN: CPT | Mod: 25

## 2023-11-30 PROCEDURE — 71046 X-RAY EXAM CHEST 2 VIEWS: CPT

## 2023-11-30 PROCEDURE — 87880 STREP A ASSAY W/OPTIC: CPT | Mod: QW

## 2023-11-30 PROCEDURE — 87804 INFLUENZA ASSAY W/OPTIC: CPT | Mod: QW

## 2023-11-30 PROCEDURE — G0101: CPT

## 2023-11-30 PROCEDURE — 87811 SARS-COV-2 COVID19 W/OPTIC: CPT | Mod: QW

## 2023-12-01 ENCOUNTER — NON-APPOINTMENT (OUTPATIENT)
Age: 73
End: 2023-12-01

## 2023-12-01 LAB
RAPID RVP RESULT: NOT DETECTED
SARS-COV-2 RNA PNL RESP NAA+PROBE: NOT DETECTED

## 2023-12-04 ENCOUNTER — TRANSCRIPTION ENCOUNTER (OUTPATIENT)
Age: 73
End: 2023-12-04

## 2023-12-04 ENCOUNTER — APPOINTMENT (OUTPATIENT)
Dept: PULMONOLOGY | Facility: CLINIC | Age: 73
End: 2023-12-04

## 2023-12-04 RX ORDER — PREDNISONE 10 MG/1
10 TABLET ORAL
Qty: 30 | Refills: 1 | Status: ACTIVE | COMMUNITY
Start: 2023-12-04 | End: 1900-01-01

## 2023-12-05 ENCOUNTER — TRANSCRIPTION ENCOUNTER (OUTPATIENT)
Age: 73
End: 2023-12-05

## 2023-12-05 ENCOUNTER — APPOINTMENT (OUTPATIENT)
Dept: RHEUMATOLOGY | Facility: CLINIC | Age: 73
End: 2023-12-05
Payer: MEDICARE

## 2023-12-05 VITALS
HEART RATE: 73 BPM | HEIGHT: 65 IN | BODY MASS INDEX: 21.33 KG/M2 | OXYGEN SATURATION: 98 % | SYSTOLIC BLOOD PRESSURE: 159 MMHG | WEIGHT: 128 LBS | DIASTOLIC BLOOD PRESSURE: 78 MMHG

## 2023-12-05 PROCEDURE — 99213 OFFICE O/P EST LOW 20 MIN: CPT

## 2023-12-12 ENCOUNTER — NON-APPOINTMENT (OUTPATIENT)
Age: 73
End: 2023-12-12

## 2023-12-14 ENCOUNTER — APPOINTMENT (OUTPATIENT)
Dept: PULMONOLOGY | Facility: CLINIC | Age: 73
End: 2023-12-14
Payer: MEDICARE

## 2023-12-14 VITALS — DIASTOLIC BLOOD PRESSURE: 82 MMHG | HEART RATE: 68 BPM | SYSTOLIC BLOOD PRESSURE: 138 MMHG | OXYGEN SATURATION: 96 %

## 2023-12-14 PROCEDURE — 94010 BREATHING CAPACITY TEST: CPT

## 2023-12-14 PROCEDURE — 95012 NITRIC OXIDE EXP GAS DETER: CPT

## 2023-12-14 PROCEDURE — 94729 DIFFUSING CAPACITY: CPT

## 2023-12-14 PROCEDURE — 94727 GAS DIL/WSHOT DETER LNG VOL: CPT

## 2023-12-14 PROCEDURE — 99214 OFFICE O/P EST MOD 30 MIN: CPT | Mod: 25

## 2023-12-14 PROCEDURE — ZZZZZ: CPT

## 2023-12-14 RX ORDER — FLUTICASONE PROPIONATE 50 UG/1
50 SPRAY, METERED NASAL
Qty: 16 | Refills: 3 | Status: ACTIVE | COMMUNITY
Start: 2020-08-05 | End: 1900-01-01

## 2023-12-14 NOTE — PROCEDURE
[FreeTextEntry1] : NIOX  8  ppb WNL  12/14/23 PFT 12/14/23 jakob nl  Lung Volumes nl  DLCO 92 % WNL HGB 12.8  NIOx  18  ppb WNL 11/1/23  [place] 11/1/23  flow  rates nl  very mild OAD noted mild pos interval improvement of flow  rates  NIOX 11 ppb WNL  9/20/23 [place] 9/20/23 Mild OAD No BD at FEV1  Pos  BD at small airways 26 %  NIOX 14  ppb WNL  8/9/23 PFT 8/9/23 [place] mild OAD ratio 75  LUng Volumes nl TLC 94 %  DLCO  82 % HGB B13.4 interval improvement at TLC and DLCO  NIOX 12 ppb WNL 6/16/23  JAKOB 6/26/23  very mild OAD No BD at FEV1  NIOX  16  ppb WNL 5/15/23  [place] 5/15/23 minimal OAD 24 % BD at small airways  NIOX  14  ppb WNL 4/17/23  JAKOB No BD  very Mild OAD  Pos significant interval improvement at Flow  rates  NIOX  23  ppb interval improvement 4/7/23 PFT April 7, 2023 Mild reduction flow Mild obstructive ventilatory impairment No response to bronchodilator FEV1 28% response to bronchodilator at the small airways Lung volumes are normal No air trapping Diffusion low with normal range 74% predicted Hemoglobin 11.5 Wrist positive decline of the flow rates dating back to March 9, 2023 study Also noted since October 24, 2022 there is a greater than 600 cc decline of both the FEV1 and FVC as well as diffusion   [place] 3/9/23 well preserved flow rates mild OAD NIOX increased to 40 PPV March 9, 2023 consistent with bronchial inflammation  Spirometry December 29, 2022 Post influenza chest congestion Flow rates are normal FEV1 93% predicted Ratio 77 compared November 30, 2020 do demonstrate interval improvement at the FVC with a mild interval improvement at the FEV1  Chest x-ray PA lateral 12/29/2022 post influenza chest congestion Cardiac size is normal Lung fields are clear No parenchymal infiltrates pleural effusions or dominant pulmonary nodules No evidence for superimposed pneumonia Data comparison to April 27, 2022 does not demonstrate any interval change Impression clear lung   Spirometry November 30, 2022 Flow rates are within normal limits Although significant data of October 24, 2022 there is decline at the flow rates NIOx  7  ppb 11/30/22 nl range  PFT 10/24/22 flow rates nl  Lungs Volumes nl DLCo 86 % HGB 13.3 overall stable pulmonary physiology NIOX  9  ppb 10/24/22 nl range  Spirometry no bronchodilator August 3, 2022 Flow rates normal FEV1 57% predicted Ratio 84 Overall interval improvement of flow rates compared to [place] 15, 2022 NIOX 9 PPD normal range no evidence of active bronchial inflammation August 2, 2022  Pulmonary 6-minute walk exercise study August 3, 2022 Baseline room O2 saturation 96% Positive for minute desaturation 87% with at 5 minutes back up to 90% on room air with good recovery 94 to 96% Impression no evident evidence for indication for portable oxygen therapy protocol  PFT 6/15/22 Flow rates nl  Lung Volumes nl DLCO 83 %  HGB 11.3  CPAP data compliance Usage through June 14, 2022 Usage 100% Hours greater than 4 AutoSet CPAP 6 to 16 cm H2O AHI 1.2  Chest x-ray PA lateral April 27, 2022 Normal cardiac size Positive for scar right midlung zone No pleural effusions pneumothorax Bronchiectatic change right lower lung zone No evidence for parenchymal consolidation No interval change compared to chest x-ray October 25, 2021  PFT 3/17/22 Flow rates nl  Lung Volumes nl TLC 97 %  DLCO 89 % HGB 12.1  Chest x-ray PA lateral October 25, 2021 Cardiac size is normal Lower lumbar sacral scoliosis Bronchiectatic changes right lower lung zone No parenchymal consolidation pleural effusions pneumothorax Mediastinum hilum unremarkable IMP no evidence for pneumonia  PFT 10/6/21 Flow rates nl TLC 89 %  DLCO  87 % HGB 15.0  Sleep study April 29-April 30, 2021 Overall mild obstructive sleep apnea AHI 7 Time spent less than 90% on room air 4.3% Mean O2 saturation 94.5% Recommendation address treatment protocol with auto CPAP PFT 7/28/21 Flow Rtaes nl Lung Volumes nl TTLC 95 % DLCO 91 % nl HGB 15.0 PFT 4/22/21 Flow  rates nl Lung Volumes Nl DLCO 96 % nl range  HGB 11.2 NIOX  11 ppb 4/22/21  PFT with body box August 12, 2020 Normal flow rates Mild obstructive pattern with an FEV1 FVC 74 No bronchodilator response at FEV1 20% response at small airways Normal lung volumes are Noted increased % predicted. Specific inductance and resistance normal Diffusion normal 95% predicted. Hemoglobin 13.79  Pulmonary 6-minute walk step stress test August 12, 2020 Total steps 1/10/2017 Baseline room air O2 saturation 96% Desaturation at minute 6  89% with immediate recovery to 96% Impression minimal O2 desaturation No indication for portable oxygen therapy  Chest x-ray PA lateral August 12, 2020 Normal cardiac size Scarring left lower lung zone cannot exclude component of bronchiectasis Right lung is otherwise clear No evidence for jodi adenopathy No dominant pulmonary nodules or pneumothorax Impression bronchiectatic change left lower lung zone  Persistent mild reduction IgA normal range 70 Data review Alpha-1 antitrypsin titer negative 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

## 2023-12-14 NOTE — DISCUSSION/SUMMARY
[FreeTextEntry1] : SXS out of proportion to PFT data Increased cough chest pain chest tightness with underlying obstructive airway disease in patient with known bronchiectasis and decline of overall pulmonary physiology overall interval improvement of the NIOX and Flow  rates Does have a questionable positive penicillin allergy as well as Levaquin and sulfa issue with B2  [employer] Flovent 220 2 puffs BID and Rinse now  back at 110 mcg dose trial Spiriva 2.5 mcg 2 puffs QD OFF continue  with samples Influenza A hxistory Hx COVID infection Dec 2021 Mild MAEGAN  AHI 7 with excessive daytime sleepiness 4/29-30/2021 Asthma with less dosing of medication and only use of inhaled steroid without dilated noted increased NIOX  Abnormal CXR CT CHEST Bronchiectasis- IgA deficiency Hx as  noted hypothyroid Childhood polio Mild MR 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 noted just below normal IGA and low IgG 2 but demonstrates AB to strep  and tetanus R/O plasma cell dyscrasia  Recommendations 6 min walk- July-RS NIOX    singulair with pm dinner- HA change Accolate OFF remains on Co Q 10  prn Albuterol  NACL in NEB only BID no indication Antibx Issue immuno serology IGA and IgG  with subtypes A1AT titer HP panel and possible autoimmune serology- reviewed To consider VEST therapy if Bronchiectasis confirmed  with CT CHEST but requests to hold Pneumonia vaccines up to  date post 67 Oral  Appliance- then  restudy and make decision re CPAP- not active use f/u Dr Mckeon COVID booster recommended PFIZER Look into Acapella device using device with improvement- Aerobilki- active use  Mucinex  Allergy Sulfa PCN Levaquin Allergy f/u Patient compliant with CPAP therapy.  Continue present settings.  Patient with demonstrated clinical benefit with daytime and nocturnal symptomatology improvement. OFF  ENT  Dr Kodak Tadeo f/u AL with noted  recommendation Menigitis AB vaccine f/u OPTH  f/u GYN DEXA on ICS

## 2023-12-14 NOTE — HISTORY OF PRESENT ILLNESS
[Former] : former [TextBox_4] : Chest congestion cough tightness wheeze with questionable interval improvement doxycycline with recurrent symptoms in a patient with bronchiectasis selective IgA deficiency Not as  active with sxs added symbicort addition Flovent 110 mcg dose post Strep dx Urgent care 4/8/23 and Z LILLIANA sxs  better overall only feels some chest  pressure noted stopped Spiriva felt caused hoarse and not helping otherwise no decline since last  visit with minimal use of KARLOS  Popst Knee surgery left without resp  complications  feels pos  side  effects Wixela  felt inc BP  with systemic  steroids  ICS- Flovent HFA  110 mcg  2 puffs   post Influ A pos chest congestion resolved fever did not  get Tamiflu  HEME IgA deficiency, 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 To b noted, lab in 2004 showing Cr 0.9; Serum Kappa 398; lambda 70. lab in 7/2022 showed normal blood counts, Cr 0.97. Lab in 9/2022 showed IgG 1464; IgA 82; Kappa FLC 2.48; Lambda FLC 1.23; K/L ratio 2.02.  plan -Discussed with pt about lab results; increased monoclonal light chain level has been stable since 2004.  -continue to monitor MGUS symptoms and lab. Explained for pt that the risk of MGUS progresses to MM on average is about 1% each year according to data from [facility]. -RTC in one year  AI recommended HEME  72 year old female with DCIS s/p radiation and lumpectomy, Hashimoto thyroiditis, bronchiectasis, recurrent PNAs, reported allergies to multiple antibiotics presents for drug allergy evaluation.  MBL def.; needs mening vaccines A and B.  - delabeled from penicillin allergy, can use penicillins, cephalosporins as indicated REPORTED REACTION TO GENTAMICIN, VANCOMYCIN  There is a concern for RED MAN SYNDROME with IV Vancomycin. Vancomycin is a known trigger for mast cell degranulation via direct stimulation of mast cells and/or basophils, which may be due to bypass of the antibody-mediated pathway and activation of a mast cell-specific receptor called Mas-related G protein-coupled receptor X2 (MRGPRX2).  skin testing was inconclusive to GENTAMICIN, VANCOMYCIN  - avoid GENTAMICIN, VANCOMYCIN  = can repeat skin testing in the future - Based on history and positive skin testing, avoid levaquin - Cipro can be prescribed if indicated. Recommend 1st dose to be administered under physician's observation. can be done in my office.  SULFONAMIDE ANTIBIOTIC ALLERGY: Patient has a remote history of delayed benign rash when taking sulfa antibiotics. Unfortunately, there is no validated test available for sulfonamides.should continue avoiding sulfa antibiotics.  If/ when administration of sulfonamide is required, physician-supervised administration/graded challenge can be performed.  REPORTED REACTION TO CODEINE Codeine is a known trigger for mast cell degranulation via mechanism mentioned above.  Recommend avoidance.  ?ALLERGY TO IV CONTRAST  Pt cannot recall whether she reacted to IV contrast in the past. We asked that she obtain records to determine if additional testing is warranted. Of note however there is an ongoing national shortage of IV contrast and therefore testing is prohibited at this time. If the patient is unable to avoid the use of IV contrast then she should premedicate.  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 meningitidis IgG, Vaccine; Status:Active; Requested for:[id number];  Total Hemolytic Complement; Status:Resulted - Requires Verification;   Done: 17Oct2022 03:44PM Discussion/Summary 72 year old female with PMH of DCIS s/p radiation, bronchiectasis, Hashimoto's, recurrent infections, multiple antibiotic, chronic urticaria,  allergies here for amoxicillin challenge.  AMOXICILLIN CHALLENGE: Today the patient, took 1050 mg of Amoxicillin in THREE  divided doses  in the office and she was observed for 1 hour. The challenge was started with 50 mg of Amoxicillin, then the dose was increased to 500 mg. Approximated, 45 minutes later she complained of mild pruritus on right upper extremity. 10 minutes later, she developed  erythematous rash with one hive on her neck. Vitals and Breath sounds are wnl. The rash self resolved in forty minutes, the dose was escalated to another 500mg, which was tolerated without any issues. The patient,  admitted to developing random hives without any triggers. We believer, her recent rash with amoxicillin most likely is associated with history of recurrent urticaria.  Patient developed one hive after Amoxicillin 500 mg, therefore was given an additional dose of Amoxicillin without new hives and initial hive resolved. Therefore, hives are likely due to underlying urticaria. -We will remove amoxicillin allergy from the patient's chart. Advised to use Amoxicillin or Penicillin in future with long acting antihistamine, such as Loratidine. Penicillin removed from the allergy list.  Low IgA and  72 year old female with DCIS s/p radiation and lumpectomy, Hashimoto thyroiditis, bronchiectasis, recurrent PNAs, reported allergies to multiple antibiotics presents for drug allergy evaluation.   skin testing to Vancomycin, Gentamicin, Levaquin, Cipro REPORTED REACTION TO GENTAMICIN, VANCOMYCIN  There is a concern for RED MAN SYNDROME with IV Vancomycin. Vancomycin is a known trigger for mast cell degranulation via direct stimulation of mast cells and/or basophils, which may be due to bypass of the antibody-mediated pathway and activation of a mast cell-specific receptor called Mas-related G protein-coupled receptor X2 (MRGPRX2). We will pursue additional evaluation to further investigate whether or not she has true IgE-mediated hypersensitivity.  REPORTED REACTION TO PENICILLIN Her remote history of skin "sinking in" after a penicillin shot is highly doubtful of true penicillin hypersensitivity. Would recommend amoxicillin challenge.  Patient will schedule an appointment for office graded challenge to Amoxicillin. If patient tolerates the 1st dose of Amoxicillin in the office without adverse effect, he/she will complete 3 days of treatment and we will observe for delayed reactions. If there is no delayed reaction observed, we will remove penicillin from drug allergy list.  We discussed that skin testing is predictive only for immediate, IgE-mediated allergic reactions. Patient's history is not consistent with immediate type of hypersensitivity. People with negative skin test, still have a chance of developing delayed hypersensitivity reaction. Currently, there are no scientifically validated tests for delayed reactions available. We discussed that many patients labeled with penicillin allergy are, in fact, not allergic to penicillin. Diagnosis of penicillin allergy leads to use of alternative, broader spectrum antibiotics and was demonstrated to cause higher rate of complications and morbidity.Patient was instructed to hold antihistamines, including topical intranasal and ocular antihistamine preparations, for at least 5 days before the testing. REPORTED REACTION TO IV LEVAQUIN, unclear reaction to PO CIPROFLOXACIN Can pursue additional testing to levaquin given concern for hypersensitivity. Ciprofloxacin (another fluoroquinolone) is a known trigger for mast cell degranulation via the mechanism mentioned above. Pt states she would like to pursue challenge for cipro since she is unsure if this antibiotic is problematic. REPORTED REACTION TO CODEINE Codeine is a known trigger for mast cell degranulation via mechanism mentioned above. Will hold off on further testing and recommend avoidance.  ?ALLERGY TO IV CONTRAST  Pt cannot recall whether she reacted to IV contrast in the past. We asked that she obtain records to determine if additional testing is warranted. Of note however there is an ongoing national shortage of IV contrast and therefore testing is prohibited at this time. If the patient is unable to avoid the use of IV contrast then she should premedicate.  RECURRENT PNEUMONIAS, SCREENING FOR IMMUNE DEFICIENCY; history of radiation therapy for DCIS: Given the history of RECURRENT INFECTIONS, it is reasonable to interrogate cellular, humoral, and complement arms of immunity. Laboratory testing was sent as stated below and results will be discussed when available. She states she received pneumonia vaccines although not sure which one, also received TDAP within the past 10 years. We will check her antibody responses to those vaccines. MIXED RHINITIS SPT showed positivity to feathers, grass, weeds which is inconsistent with her occasional symptoms throughout the year.  Topical intranasal treatments were offered for associated POST NASAL DRIP. She states she will consider these treatments when antibiotic testing is done so as not to interfere with results. States she will use nasal saline if needed for now which helps.    Just received RESMED CPAP device- better focus Did  not tolerate Oral appliance  chest  congestion more  acute cough  with pale yellow mucous Nocturnal symptoms cough leading to wheeze No purulent sputum reports clear to white- thick mucous No hemoptysis No fevers chills or sweats Has completed 2 courses of steroids 2 courses of antibiotics with persistence of the cough  MAEGAN issue MC coverage with oral appliance with f/u Sleep  dentist Dr Mckeon   post Fall months  ago and noted rib fx Right  Otherwise states doing very well with Wixela  No inc use KARLOS  70-year-old female  Complicated pulmonary history to be reviewed There is a diagnosis of asthma and she reports bronchiectasis with multiple histories of pneumonia at least 4-5 reported last very ill November December January with an additional illness in March She reports abnormal chest CTs and chest x-rays Last chest CT she reports was approximately 2018 where she was told of having scarlike tissue With these episodes she describes cough chest tightness wheezing sputum production Medications through the winter included Y UL P DARION in the nebulizer, Brovana right axilla sodium chloride nebulizer nasal Astelin nasal Flonase albuterol HFA course of prednisone and antibiotics including doxycycline x2 courses and Zithromax.  She states 1 to 1-1/2 weeks ago she developed some trouble breathing self started treatment with albuterol She did not receive any antibiotics or steroids with this symptomatology most recently noted She does not report at present any sputum production for the past several days and it is clear without hemoptysis purulence or foul smelling She also states that she was sick in March there was some question whether she could have COVID-19 but a COVID  19 PCR and antibody testing subsequently was negative Other significant history includes a bronchoscopy dating back to April 2019 which was negative including AFB fungus mold and  negative TBLBx. Last chest x-ray available for review dates back to April 16, 2019 which was status post a transbronchial biopsy. Thickening of the minor fissure which was noted and reported chronic Reticulonodular opacities right midlung Ill-defined focal opacity periphery left upper to mid lung with some central extension reported to the left hilum with out pneumothorax pleural effusion or significant volume loss.  Additional history Right breast DCIS treated radiation therapy 2017 Childhood polio Mild mitral regurgitation Hypothyroidism Nocturnal myoclonus  Vaccination profile flu vaccinations up-to-date 2018 2019 up-to-date with Prevnar and PCV 23

## 2023-12-18 ENCOUNTER — TRANSCRIPTION ENCOUNTER (OUTPATIENT)
Age: 73
End: 2023-12-18

## 2023-12-18 ENCOUNTER — APPOINTMENT (OUTPATIENT)
Dept: UROLOGY | Facility: CLINIC | Age: 73
End: 2023-12-18

## 2023-12-18 ENCOUNTER — NON-APPOINTMENT (OUTPATIENT)
Age: 73
End: 2023-12-18

## 2023-12-18 ENCOUNTER — OUTPATIENT (OUTPATIENT)
Dept: OUTPATIENT SERVICES | Facility: HOSPITAL | Age: 73
LOS: 1 days | End: 2023-12-18
Payer: MEDICARE

## 2023-12-18 ENCOUNTER — APPOINTMENT (OUTPATIENT)
Dept: ULTRASOUND IMAGING | Facility: IMAGING CENTER | Age: 73
End: 2023-12-18
Payer: MEDICARE

## 2023-12-18 DIAGNOSIS — Z90.49 ACQUIRED ABSENCE OF OTHER SPECIFIED PARTS OF DIGESTIVE TRACT: Chronic | ICD-10-CM

## 2023-12-18 DIAGNOSIS — Z00.8 ENCOUNTER FOR OTHER GENERAL EXAMINATION: ICD-10-CM

## 2023-12-18 PROCEDURE — 76641 ULTRASOUND BREAST COMPLETE: CPT | Mod: 26,50,GY

## 2023-12-18 PROCEDURE — 76641 ULTRASOUND BREAST COMPLETE: CPT

## 2023-12-18 RX ORDER — CEFUROXIME AXETIL 500 MG/1
500 TABLET ORAL
Qty: 14 | Refills: 0 | Status: ACTIVE | COMMUNITY
Start: 2023-12-18 | End: 1900-01-01

## 2023-12-19 ENCOUNTER — TRANSCRIPTION ENCOUNTER (OUTPATIENT)
Age: 73
End: 2023-12-19

## 2023-12-22 ENCOUNTER — APPOINTMENT (OUTPATIENT)
Dept: PEDIATRIC ALLERGY IMMUNOLOGY | Facility: CLINIC | Age: 73
End: 2023-12-22
Payer: MEDICARE

## 2023-12-22 ENCOUNTER — TRANSCRIPTION ENCOUNTER (OUTPATIENT)
Age: 73
End: 2023-12-22

## 2023-12-22 DIAGNOSIS — J06.9 ACUTE UPPER RESPIRATORY INFECTION, UNSPECIFIED: ICD-10-CM

## 2023-12-22 PROCEDURE — 99213 OFFICE O/P EST LOW 20 MIN: CPT | Mod: 25

## 2023-12-22 NOTE — REVIEW OF SYSTEMS
[Fatigue] : no fatigue [Fever] : no fever [Eye Discharge] : no eye discharge [Eye Redness] : no redness [Puffy Eyelids] : no puffy ~T eyelids [Bloodshot Eyes] : no bloodshot ~T eyes [Nasal Congestion] : no nasal congestion [Post Nasal Drip] : no post nasal drip [Sneezing] : no sneezing [Vomiting] : no vomiting [Diarrhea] : no diarrhea [Nl] : Cardiovascular [FreeTextEntry6] : see HPI

## 2023-12-22 NOTE — PHYSICAL EXAM
[Alert] : alert [Well Nourished] : well nourished [No Acute Distress] : no acute distress [Well Developed] : well developed [Sclera Not Icteric] : sclera not icteric [Conjunctival Erythema] : no conjunctival erythema [Normal TMs] : both tympanic membranes were normal [Boggy Nasal Turbinates] : no boggy and/or pale nasal turbinates [Pharyngeal erythema] : no pharyngeal erythema [Posterior Pharyngeal Cobblestoning] : no posterior pharyngeal cobblestoning [Clear Rhinorrhea] : no clear rhinorrhea was seen [No Neck Mass] : no neck mass was observed [Normal Rate and Effort] : normal respiratory rhythm and effort [No Crackles] : no crackles [Bilateral Audible Breath Sounds] : bilateral audible breath sounds [Wheezing] : no wheezing was heard [Normal Rate] : heart rate was normal  [Normal S1, S2] : normal S1 and S2 [No murmur] : no murmur [Regular Rhythm] : with a regular rhythm [Normal Cervical Lymph Nodes] : cervical [Skin Intact] : skin intact  [No Rash] : no rash [Patches] : no patches [Urticaria] : no urticaria [Normal Mood] : mood was normal [Normal Affect] : affect was normal [Judgment and Insight Age Appropriate] : judgement and insight is age appropriate [Alert, Awake, Oriented as Age-Appropriate] : alert, awake, oriented as age appropriate

## 2023-12-22 NOTE — HISTORY OF PRESENT ILLNESS
[de-identified] :     73 year old female with PMH of DCIS s/p radiation, bronchiectasis, Hashimoto's, recurrent infections, MBL deficiency, MGUS, multiple antibiotic allergies, here for sick visit.  c/o- Last Friday she, developed URI, Was treated with cipro Friday night, two days later developed pruritic hives on back, stomach, head and arms. Cipor, rash resolves at 12 am and redevelops at 4 pm.  She D/c cipro-  d/c.   Monday rash resolved, called PMD, was changed to Ceftin, few hours after the ceftin developed hives on her back, self-resolved in midnight and redevelops few hours later. Still on Ceftin.   Shorthness of breath and UTI improved with ceftin, still has cough. She is taking Benadryl at night.   Also admits to dry cough, on flovent. Asthma- followed by pulm.     Has bronciectasis.  = delabeled from PCN allergy; negative amoxicillin challenge 10/17/22  = s/p Prevnar 20 10/24/22 and HIB 10/3/22  = Initial immune laboratories were SIGNIFICANT for borderline low IgA, IgGkappa band, nonprotective specific antibody titers to diphtheria, HIB and Strep. pneumoniae- +9/22, MBL-83, repeat MBL<70; CH50-40, repeat normal    1/10/2023:  - She is feeling better today. No antihistamines in preparation for skin testing.    HISTORY:  1/3/2022:  She had FLU last week and was started on Doxycycline for persistent cough. Yesterday Dr Stein added Wixela.  patient has a headache and mildly elevated BP and thinks this is from Wixela. As per Dr Stein's notes, she has had headaches before.    11/15/2022  - She traveled to [place]: was bitten by a horsefly, developed cellulitis, just completed Keflex. She fell, concern for a broken rib.    DRUG ALLERGY  Gentamicin, vancomycin: Years ago was at [facility] getting hysterectomy and was given abx prophylactically, both at the same time. Developed hives right after the first dose, not sure which one caused it. No angioedema. No respiratory symptoms. Avoids.    - Codeine: 15-20 years ago got codeine for pain after hysteroscopy, developed hives right after the first or second dose. Avoids.    - Levaquin: had previously tolerated in PO form but when she was hospitalized at [facility] for PNA she received levaquin. Reports that her whole body turned red and she felt hot, she is unsure if this occurred immediately after or the day after. No hives or swelling. Is unsure if this was true allergy.    - Sulfa antibiotic- possibly rash in her 30s, she is not sure    - Penicillin: as a child she states she received a penicillin injection does not know what indication was. The next day the skin "sunk in." No redness or itchiness. Has been avoiding it since then. Cannot remember additional details.    - Has a paper with her from Dr. Boxer 10 years ago that states she can take clinda, tetracyline, cephalosporins. She has written down cipro, doxy, keflex, macrobid, macrobid. She states she "doesn't know if she can take these anymore" since it's been many years.    IV contrast is listed on allergy tab however she cannot recall the circumstances and whether or not she had reaction. Same for sulfa allergies.    She denies any issues with hives other than as mentioned above.    INFECTION HISTORY  Reports that she "never gets just a cold." Always turns into bronchitis or PNA. Follows Dr. Stein.  States that as a child she would get sick frequently, would always get abx. Never had unusual infections. States she received chest irradiation in Feb 2018. Notes that she had PNA back in 2015 even before radiation. Hospitalized for it and received IV Levaquin. Total of 5 PNA's since that time radiographically confirmed. IGG subsets in 2020 showed elevated IgG1, but low IGG2 and IGG3. No miscarriages.    FHx: colon cancer in mom, prostate ca in brother, younger sister had endometrial cancer.   No history or symptoms of allergic rhinitis, eczematous rashes, food allergies.

## 2023-12-28 ENCOUNTER — APPOINTMENT (OUTPATIENT)
Dept: PULMONOLOGY | Facility: CLINIC | Age: 73
End: 2023-12-28
Payer: MEDICARE

## 2023-12-28 ENCOUNTER — APPOINTMENT (OUTPATIENT)
Dept: PULMONOLOGY | Facility: CLINIC | Age: 73
End: 2023-12-28

## 2023-12-28 VITALS — HEART RATE: 70 BPM | OXYGEN SATURATION: 95 % | DIASTOLIC BLOOD PRESSURE: 77 MMHG | SYSTOLIC BLOOD PRESSURE: 131 MMHG

## 2023-12-28 DIAGNOSIS — J44.89 OTHER SPECIFIED CHRONIC OBSTRUCTIVE PULMONARY DISEASE: ICD-10-CM

## 2023-12-28 DIAGNOSIS — T17.500A UNSPECIFIED FOREIGN BODY IN BRONCHUS CAUSING ASPHYXIATION, INITIAL ENCOUNTER: ICD-10-CM

## 2023-12-28 LAB — POCT - HEMOGLOBIN (HGB), QUANTITATIVE, TRANSCUTANEOUS: 14.4

## 2023-12-28 PROCEDURE — 88738 HGB QUANT TRANSCUTANEOUS: CPT

## 2023-12-28 PROCEDURE — 94010 BREATHING CAPACITY TEST: CPT

## 2023-12-28 PROCEDURE — 94727 GAS DIL/WSHOT DETER LNG VOL: CPT

## 2023-12-28 PROCEDURE — 94729 DIFFUSING CAPACITY: CPT

## 2023-12-28 PROCEDURE — 99215 OFFICE O/P EST HI 40 MIN: CPT | Mod: 25

## 2023-12-28 RX ORDER — CEFUROXIME AXETIL 500 MG/1
500 TABLET ORAL
Qty: 14 | Refills: 0 | Status: DISCONTINUED | COMMUNITY
Start: 2023-12-04 | End: 2023-12-28

## 2023-12-28 RX ORDER — FORMOTEROL FUMARATE DIHYDRATE 0.02 MG/2ML
20 SOLUTION RESPIRATORY (INHALATION)
Qty: 360 | Refills: 3 | Status: ACTIVE | COMMUNITY
Start: 2023-12-28 | End: 1900-01-01

## 2023-12-28 RX ORDER — METHYLPREDNISOLONE 4 MG/1
4 TABLET ORAL
Qty: 1 | Refills: 0 | Status: DISCONTINUED | COMMUNITY
Start: 2023-11-30 | End: 2023-12-28

## 2023-12-28 RX ORDER — NITROFURANTOIN (MONOHYDRATE/MACROCRYSTALS) 25; 75 MG/1; MG/1
100 CAPSULE ORAL
Qty: 14 | Refills: 0 | Status: DISCONTINUED | COMMUNITY
Start: 2023-12-18 | End: 2023-12-28

## 2023-12-28 RX ORDER — FLUTICASONE PROPIONATE 110 UG/1
110 AEROSOL, METERED RESPIRATORY (INHALATION)
Qty: 12 | Refills: 3 | Status: DISCONTINUED | COMMUNITY
Start: 2023-07-07 | End: 2023-12-28

## 2023-12-28 NOTE — PROCEDURE
[FreeTextEntry1] : PFT b12/28/23  mild  reduction flow  rates  LUNG VOLUMES NL dlco 66 % WITRH MILD  LOSS FX  ALVEOLAR  CAPILLARY UNITS hgb 14.4  Chest x-ray PA lateral November 30, 2023 indication chest congestion Cardiac size is normal Positive scoliosis No sumaya parenchymal infiltrates pleural effusions dominant pulmonary nodules consolidation or pneumothorax Comparison to chest x-ray of 12/29/2022 no gross interval change appreciated  NIOx  18  ppb WNL 11/1/23  Jakob 11/1/23  flow  rates nl  very mild OAD noted mild pos interval improvement of flow  rates  NIOX 11 ppb WNL  9/20/23 Esmond 9/20/23 Mild OAD No BD at FEV1  Pos  BD at small airways 26 %  NIOX 14  ppb WNL  8/9/23 PFT 8/9/23 Jakob mild OAD ratio 75  LUng Volumes nl TLC 94 %  DLCO  82 % HGB B13.4 interval improvement at TLC and DLCO  NIOX 12 ppb WNL 6/16/23  JAKOB 6/26/23  very mild OAD No BD at FEV1  NIOX  16  ppb WNL 5/15/23  Jakob 5/15/23 minimal OAD 24 % BD at small airways  NIOX  14  ppb WNL 4/17/23  JAKOB No BD  very Mild OAD  Pos significant interval improvement at Flow  rates  NIOX  23  ppb interval improvement 4/7/23 PFT April 7, 2023 Mild reduction flow Mild obstructive ventilatory impairment No response to bronchodilator FEV1 28% response to bronchodilator at the small airways Lung volumes are normal No air trapping Diffusion low with normal range 74% predicted Hemoglobin 11.5 Wrist positive decline of the flow rates dating back to March 9, 2023 study Also noted since October 24, 2022 there is a greater than 600 cc decline of both the FEV1 and FVC as well as diffusion   Jakob 3/9/23 well preserved flow rates mild OAD NIOX increased to 40 PPV March 9, 2023 consistent with bronchial inflammation  Spirometry December 29, 2022 Post influenza chest congestion Flow rates are normal FEV1 93% predicted Ratio 77 compared November 30, 2020 do demonstrate interval improvement at the FVC with a mild interval improvement at the FEV1  Chest x-ray PA lateral 12/29/2022 post influenza chest congestion Cardiac size is normal Lung fields are clear No parenchymal infiltrates pleural effusions or dominant pulmonary nodules No evidence for superimposed pneumonia Data comparison to April 27, 2022 does not demonstrate any interval change Impression clear lung   Spirometry November 30, 2022 Flow rates are within normal limits Although significant data of October 24, 2022 there is decline at the flow rates NIOx  7  ppb 11/30/22 nl range  PFT 10/24/22 flow rates nl  Lungs Volumes nl DLCo 86 % HGB 13.3 overall stable pulmonary physiology NIOX  9  ppb 10/24/22 nl range  Spirometry no bronchodilator August 3, 2022 Flow rates normal FEV1 57% predicted Ratio 84 Overall interval improvement of flow rates compared to Sakshi 15, 2022 NIOX 9 PPD normal range no evidence of active bronchial inflammation August 2, 2022  Pulmonary 6-minute walk exercise study August 3, 2022 Baseline room O2 saturation 96% Positive for minute desaturation 87% with at 5 minutes back up to 90% on room air with good recovery 94 to 96% Impression no evident evidence for indication for portable oxygen therapy protocol  PFT 6/15/22 Flow rates nl  Lung Volumes nl DLCO 83 %  HGB 11.3  CPAP data compliance Usage through June 14, 2022 Usage 100% Hours greater than 4 AutoSet CPAP 6 to 16 cm H2O AHI 1.2  Chest x-ray PA lateral April 27, 2022 Normal cardiac size Positive for scar right midlung zone No pleural effusions pneumothorax Bronchiectatic change right lower lung zone No evidence for parenchymal consolidation No interval change compared to chest x-ray October 25, 2021  PFT 3/17/22 Flow rates nl  Lung Volumes nl TLC 97 %  DLCO 89 % HGB 12.1  Chest x-ray PA lateral October 25, 2021 Cardiac size is normal Lower lumbar sacral scoliosis Bronchiectatic changes right lower lung zone No parenchymal consolidation pleural effusions pneumothorax Mediastinum hilum unremarkable IMP no evidence for pneumonia  PFT 10/6/21 Flow rates nl TLC 89 %  DLCO  87 % HGB 15.0  Sleep study April 29-April 30, 2021 Overall mild obstructive sleep apnea AHI 7 Time spent less than 90% on room air 4.3% Mean O2 saturation 94.5% Recommendation address treatment protocol with auto CPAP PFT 7/28/21 Flow Rtaes nl Lung Volumes nl TTLC 95 % DLCO 91 % nl HGB 15.0 PFT 4/22/21 Flow  rates nl Lung Volumes Nl DLCO 96 % nl range  HGB 11.2 NIOX  11 ppb 4/22/21  PFT with body box August 12, 2020 Normal flow rates Mild obstructive pattern with an FEV1 FVC 74 No bronchodilator response at FEV1 20% response at small airways Normal lung volumes are Noted increased % predicted. Specific inductance and resistance normal Diffusion normal 95% predicted. Hemoglobin 13.79  Pulmonary 6-minute walk step stress test August 12, 2020 Total steps 1/10/2017 Baseline room air O2 saturation 96% Desaturation at minute 6  89% with immediate recovery to 96% Impression minimal O2 desaturation No indication for portable oxygen therapy  Chest x-ray PA lateral August 12, 2020 Normal cardiac size Scarring left lower lung zone cannot exclude component of bronchiectasis Right lung is otherwise clear No evidence for jodi adenopathy No dominant pulmonary nodules or pneumothorax Impression bronchiectatic change left lower lung zone  Persistent mild reduction IgA normal range 70 Data review Alpha-1 antitrypsin titer negative 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

## 2023-12-28 NOTE — HISTORY OF PRESENT ILLNESS
[Former] : former [TextBox_4] : Chest congestion cough tightness wheeze  still with sxs completed antibx ceftin and prednisone taper exposure grand  children URI  chills  but  no  fever  onset  last  FRIDAY  and tx pred x  days  with ibnc dose  Flovent 220 mcg  pos  chest tightness  heaviness bronchiectasis selective IgA deficiency Not as  active with sxs added symbicort addition Flovent 110 mcg dose post Strep dx Urgent care 4/8/23 and Z LILLIANA sxs  better overall only feels some chest  pressure noted stopped Spiriva felt caused hoarse and not helping otherwise no decline since last  visit with minimal use of KARLOS  Popst Knee surgery left without resp  complications  feels pos  side  effects Wixela  felt inc BP  with systemic  steroids  ICS- Flovent HFA  110 mcg  2 puffs   post Influ A pos chest congestion resolved fever did not  get Tamiflu  HEME IgA deficiency, 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 To tadeo noted, lab in 2004 showing Cr 0.9; Serum Kappa 398; lambda 70. lab in 7/2022 showed normal blood counts, Cr 0.97. Lab in 9/2022 showed IgG 1464; IgA 82; Kappa FLC 2.48; Lambda FLC 1.23; K/L ratio 2.02.  plan -Discussed with pt about lab results; increased monoclonal light chain level has been stable since 2004.  -continue to monitor MGUS symptoms and lab. Explained for pt that the risk of MGUS progresses to MM on average is about 1% each year according to data from [facility]. -RTC in one year  AI recommended HEME  72 year old female with DCIS s/p radiation and lumpectomy, Hashimoto thyroiditis, bronchiectasis, recurrent PNAs, reported allergies to multiple antibiotics presents for drug allergy evaluation.  MBL def.; needs mening vaccines A and B.  - delabeled from penicillin allergy, can use penicillins, cephalosporins as indicated REPORTED REACTION TO GENTAMICIN, VANCOMYCIN  There is a concern for RED MAN SYNDROME with IV Vancomycin. Vancomycin is a known trigger for mast cell degranulation via direct stimulation of mast cells and/or basophils, which may be due to bypass of the antibody-mediated pathway and activation of a mast cell-specific receptor called Mas-related G protein-coupled receptor X2 (MRGPRX2).  skin testing was inconclusive to GENTAMICIN, VANCOMYCIN  - avoid GENTAMICIN, VANCOMYCIN  = can repeat skin testing in the future - Based on history and positive skin testing, avoid levaquin - Cipro can be prescribed if indicated. Recommend 1st dose to be administered under physician's observation. can be done in my office.  SULFONAMIDE ANTIBIOTIC ALLERGY: Patient has a remote history of delayed benign rash when taking sulfa antibiotics. Unfortunately, there is no validated test available for sulfonamides.should continue avoiding sulfa antibiotics.  If/ when administration of sulfonamide is required, physician-supervised administration/graded challenge can be performed.  REPORTED REACTION TO CODEINE Codeine is a known trigger for mast cell degranulation via mechanism mentioned above.  Recommend avoidance.  ?ALLERGY TO IV CONTRAST  Pt cannot recall whether she reacted to IV contrast in the past. We asked that she obtain records to determine if additional testing is warranted. Of note however there is an ongoing national shortage of IV contrast and therefore testing is prohibited at this time. If the patient is unable to avoid the use of IV contrast then she should premedicate.  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 meningitidis IgG, Vaccine; Status:Active; Requested for:[id number];  Total Hemolytic Complement; Status:Resulted - Requires Verification;   Done: 17Oct2022 03:44PM Discussion/Summary 72 year old female with PMH of DCIS s/p radiation, bronchiectasis, Hashimoto's, recurrent infections, multiple antibiotic, chronic urticaria,  allergies here for amoxicillin challenge.  AMOXICILLIN CHALLENGE: Today the patient, took 1050 mg of Amoxicillin in THREE  divided doses  in the office and she was observed for 1 hour. The challenge was started with 50 mg of Amoxicillin, then the dose was increased to 500 mg. Approximated, 45 minutes later she complained of mild pruritus on right upper extremity. 10 minutes later, she developed  erythematous rash with one hive on her neck. Vitals and Breath sounds are wnl. The rash self resolved in forty minutes, the dose was escalated to another 500mg, which was tolerated without any issues. The patient,  admitted to developing random hives without any triggers. We believer, her recent rash with amoxicillin most likely is associated with history of recurrent urticaria.  Patient developed one hive after Amoxicillin 500 mg, therefore was given an additional dose of Amoxicillin without new hives and initial hive resolved. Therefore, hives are likely due to underlying urticaria. -We will remove amoxicillin allergy from the patient's chart. Advised to use Amoxicillin or Penicillin in future with long acting antihistamine, such as Loratidine. Penicillin removed from the allergy list.  Low IgA and  72 year old female with DCIS s/p radiation and lumpectomy, Hashimoto thyroiditis, bronchiectasis, recurrent PNAs, reported allergies to multiple antibiotics presents for drug allergy evaluation.   skin testing to Vancomycin, Gentamicin, Levaquin, Cipro REPORTED REACTION TO GENTAMICIN, VANCOMYCIN  There is a concern for RED MAN SYNDROME with IV Vancomycin. Vancomycin is a known trigger for mast cell degranulation via direct stimulation of mast cells and/or basophils, which may be due to bypass of the antibody-mediated pathway and activation of a mast cell-specific receptor called Mas-related G protein-coupled receptor X2 (MRGPRX2). We will pursue additional evaluation to further investigate whether or not she has true IgE-mediated hypersensitivity.  REPORTED REACTION TO PENICILLIN Her remote history of skin "sinking in" after a penicillin shot is highly doubtful of true penicillin hypersensitivity. Would recommend amoxicillin challenge.  Patient will schedule an appointment for office graded challenge to Amoxicillin. If patient tolerates the 1st dose of Amoxicillin in the office without adverse effect, he/she will complete 3 days of treatment and we will observe for delayed reactions. If there is no delayed reaction observed, we will remove penicillin from drug allergy list.  We discussed that skin testing is predictive only for immediate, IgE-mediated allergic reactions. Patient's history is not consistent with immediate type of hypersensitivity. People with negative skin test, still have a chance of developing delayed hypersensitivity reaction. Currently, there are no scientifically validated tests for delayed reactions available. We discussed that many patients labeled with penicillin allergy are, in fact, not allergic to penicillin. Diagnosis of penicillin allergy leads to use of alternative, broader spectrum antibiotics and was demonstrated to cause higher rate of complications and morbidity.Patient was instructed to hold antihistamines, including topical intranasal and ocular antihistamine preparations, for at least 5 days before the testing. REPORTED REACTION TO IV LEVAQUIN, unclear reaction to PO CIPROFLOXACIN Can pursue additional testing to levaquin given concern for hypersensitivity. Ciprofloxacin (another fluoroquinolone) is a known trigger for mast cell degranulation via the mechanism mentioned above. Pt states she would like to pursue challenge for cipro since she is unsure if this antibiotic is problematic. REPORTED REACTION TO CODEINE Codeine is a known trigger for mast cell degranulation via mechanism mentioned above. Will hold off on further testing and recommend avoidance.  ?ALLERGY TO IV CONTRAST  Pt cannot recall whether she reacted to IV contrast in the past. We asked that she obtain records to determine if additional testing is warranted. Of note however there is an ongoing national shortage of IV contrast and therefore testing is prohibited at this time. If the patient is unable to avoid the use of IV contrast then she should premedicate.  RECURRENT PNEUMONIAS, SCREENING FOR IMMUNE DEFICIENCY; history of radiation therapy for DCIS: Given the history of RECURRENT INFECTIONS, it is reasonable to interrogate cellular, humoral, and complement arms of immunity. Laboratory testing was sent as stated below and results will be discussed when available. She states she received pneumonia vaccines although not sure which one, also received TDAP within the past 10 years. We will check her antibody responses to those vaccines. MIXED RHINITIS SPT showed positivity to feathers, grass, weeds which is inconsistent with her occasional symptoms throughout the year.  Topical intranasal treatments were offered for associated POST NASAL DRIP. She states she will consider these treatments when antibiotic testing is done so as not to interfere with results. States she will use nasal saline if needed for now which helps.    Just received RESMED CPAP device- better focus Did  not tolerate Oral appliance  chest  congestion more  acute cough  with pale yellow mucous Nocturnal symptoms cough leading to wheeze No purulent sputum reports clear to white- thick mucous No hemoptysis No fevers chills or sweats Has completed 2 courses of steroids 2 courses of antibiotics with persistence of the cough  MAEGAN issue MC coverage with oral appliance with f/u Sleep  dentist Dr Mckeon   post Fall months  ago and noted rib fx Right  Otherwise states doing very well with Wixela  No inc use KARLOS  70-year-old female  Complicated pulmonary history to be reviewed There is a diagnosis of asthma and she reports bronchiectasis with multiple histories of pneumonia at least 4-5 reported last very ill November December January with an additional illness in March She reports abnormal chest CTs and chest x-rays Last chest CT she reports was approximately 2018 where she was told of having scarlike tissue With these episodes she describes cough chest tightness wheezing sputum production Medications through the winter included Y UL P DARION in the nebulizer, Brovana right axilla sodium chloride nebulizer nasal Astelin nasal Flonase albuterol HFA course of prednisone and antibiotics including doxycycline x2 courses and Zithromax.  She states 1 to 1-1/2 weeks ago she developed some trouble breathing self started treatment with albuterol She did not receive any antibiotics or steroids with this symptomatology most recently noted She does not report at present any sputum production for the past several days and it is clear without hemoptysis purulence or foul smelling She also states that she was sick in March there was some question whether she could have COVID-19 but a COVID  19 PCR and antibody testing subsequently was negative Other significant history includes a bronchoscopy dating back to April 2019 which was negative including AFB fungus mold and  negative TBLBx. Last chest x-ray available for review dates back to April 16, 2019 which was status post a transbronchial biopsy. Thickening of the minor fissure which was noted and reported chronic Reticulonodular opacities right midlung Ill-defined focal opacity periphery left upper to mid lung with some central extension reported to the left hilum with out pneumothorax pleural effusion or significant volume loss.  Additional history Right breast DCIS treated radiation therapy 2017 Childhood polio Mild mitral regurgitation Hypothyroidism Nocturnal myoclonus  Vaccination profile flu vaccinations up-to-date 2018 2019 up-to-date with Prevnar and PCV 23

## 2023-12-28 NOTE — DISCUSSION/SUMMARY
[FreeTextEntry1] : Increased cough chest pain chest tightness with underlying obstructive airway disease in patient with known bronchiectasis and decline of overall pulmonary physiology overall interval improvement of the NIOX and Flow  rates Post doxycycline treatment COMPLETED Retx Medrol  evangelina COMPLETED Performist BID with acetycysteine BID   Cont Flovent 2 puffs BID office  f/u  return office post  return Fl  Does have a questionable positive penicillin allergy as well as Levaquin and sulfa issue with B2  [employer] Flovent 220 2 puffs BID and Rinse now  back at 110 mcg dose trial Spiriva 2.5 mcg 2 puffs QD OFF continue  with samples Influenza A hxistory Hx COVID infection Dec 2021 Mild MAEGAN  AHI 7 with excessive daytime sleepiness 4/29-30/2021 Asthma with less dosing of medication and only use of inhaled steroid without dilated noted increased NIOX  Abnormal CXR CT CHEST Bronchiectasis- IgA deficiency Hx as  noted hypothyroid Childhood polio Mild MR 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 noted just below normal IGA and low IgG 2 but demonstrates AB to strep  and tetanus R/O plasma cell dyscrasia  Recommendations 6 min walk- July-RS NIOX    singulair with pm dinner- HA change Accolate OFF remains on Co Q 10  prn Albuterol  NACL in NEB only BID no indication Antibx Issue immuno serology IGA and IgG  with subtypes A1AT titer HP panel and possible autoimmune serology- reviewed To consider VEST therapy if Bronchiectasis confirmed  with CT CHEST but requests to hold Pneumonia vaccines up to  date post 67 Oral  Appliance- then  restudy and make decision re CPAP- not active use f/u Dr Mckeon COVID booster recommended PFIZER Look into Acapella device using device with improvement- Aerobilki- active use  Mucinex  Allergy Sulfa PCN Levaquin Allergy f/u Patient compliant with CPAP therapy.  Continue present settings.  Patient with demonstrated clinical benefit with daytime and nocturnal symptomatology improvement. OFF  ENT  Dr Kodak Tadeo f/u AL with noted  recommendation Menigitis AB vaccine f/u OPTH  f/u GYN DEXA on ICS

## 2024-01-24 ENCOUNTER — TRANSCRIPTION ENCOUNTER (OUTPATIENT)
Age: 74
End: 2024-01-24

## 2024-01-26 ENCOUNTER — TRANSCRIPTION ENCOUNTER (OUTPATIENT)
Age: 74
End: 2024-01-26

## 2024-01-27 RX ORDER — AZITHROMYCIN 250 MG/1
250 TABLET, FILM COATED ORAL
Qty: 1 | Refills: 0 | Status: ACTIVE | COMMUNITY
Start: 2024-01-27 | End: 1900-01-01

## 2024-03-14 ENCOUNTER — APPOINTMENT (OUTPATIENT)
Dept: PULMONOLOGY | Facility: CLINIC | Age: 74
End: 2024-03-14
Payer: MEDICARE

## 2024-03-14 VITALS — OXYGEN SATURATION: 97 % | DIASTOLIC BLOOD PRESSURE: 78 MMHG | SYSTOLIC BLOOD PRESSURE: 143 MMHG | HEART RATE: 70 BPM

## 2024-03-14 PROCEDURE — ZZZZZ: CPT

## 2024-03-14 PROCEDURE — 94010 BREATHING CAPACITY TEST: CPT

## 2024-03-14 PROCEDURE — 94729 DIFFUSING CAPACITY: CPT

## 2024-03-14 PROCEDURE — 99214 OFFICE O/P EST MOD 30 MIN: CPT | Mod: 25

## 2024-03-14 PROCEDURE — 95012 NITRIC OXIDE EXP GAS DETER: CPT

## 2024-03-14 PROCEDURE — 94727 GAS DIL/WSHOT DETER LNG VOL: CPT

## 2024-03-15 NOTE — PROCEDURE
[FreeTextEntry1] : NIOX 16 normal range no evidence of airway inflammation March 14, 2024 PFT follow-up March /14/2024 Ratio 77 Flow rates normal range Lung capacity normal Lung volumes normal TLC 90% predicted Diffusion 79% predicted normal range Interval improvement of overall pulmonary physiology    PFT b12/28/23  mild  reduction flow  rates  LUNG VOLUMES NL dlco 66 % WITRH MILD  LOSS FX  ALVEOLAR  CAPILLARY UNITS hgb 14.4  Chest x-ray PA lateral November 30, 2023 indication chest congestion Cardiac size is normal Positive scoliosis No sumaya parenchymal infiltrates pleural effusions dominant pulmonary nodules consolidation or pneumothorax Comparison to chest x-ray of 12/29/2022 no gross interval change appreciated  NIOx  18  ppb WNL 11/1/23  Jakob 11/1/23  flow  rates nl  very mild OAD noted mild pos interval improvement of flow  rates  NIOX 11 ppb WNL  9/20/23 Jakob 9/20/23 Mild OAD No BD at FEV1  Pos  BD at small airways 26 %  NIOX 14  ppb WNL  8/9/23 PFT 8/9/23 Jakob mild OAD ratio 75  LUng Volumes nl TLC 94 %  DLCO  82 % HGB B13.4 interval improvement at TLC and DLCO  NIOX 12 ppb WNL 6/16/23  JAKOB 6/26/23  very mild OAD No BD at FEV1  NIOX  16  ppb WNL 5/15/23  Jakob 5/15/23 minimal OAD 24 % BD at small airways  NIOX  14  ppb WNL 4/17/23  JAKOB No BD  very Mild OAD  Pos significant interval improvement at Flow  rates  NIOX  23  ppb interval improvement 4/7/23 PFT April 7, 2023 Mild reduction flow Mild obstructive ventilatory impairment No response to bronchodilator FEV1 28% response to bronchodilator at the small airways Lung volumes are normal No air trapping Diffusion low with normal range 74% predicted Hemoglobin 11.5 Wrist positive decline of the flow rates dating back to March 9, 2023 study Also noted since October 24, 2022 there is a greater than 600 cc decline of both the FEV1 and FVC as well as diffusion   [place] 3/9/23 well preserved flow rates mild OAD NIOX increased to 40 PPV March 9, 2023 consistent with bronchial inflammation  Spirometry December 29, 2022 Post influenza chest congestion Flow rates are normal FEV1 93% predicted Ratio 77 compared November 30, 2020 do demonstrate interval improvement at the FVC with a mild interval improvement at the FEV1  Chest x-ray PA lateral 12/29/2022 post influenza chest congestion Cardiac size is normal Lung fields are clear No parenchymal infiltrates pleural effusions or dominant pulmonary nodules No evidence for superimposed pneumonia Data comparison to April 27, 2022 does not demonstrate any interval change Impression clear lung   Spirometry November 30, 2022 Flow rates are within normal limits Although significant data of October 24, 2022 there is decline at the flow rates NIOx  7  ppb 11/30/22 nl range  PFT 10/24/22 flow rates nl  Lungs Volumes nl DLCo 86 % HGB 13.3 overall stable pulmonary physiology NIOX  9  ppb 10/24/22 nl range  Spirometry no bronchodilator August 3, 2022 Flow rates normal FEV1 57% predicted Ratio 84 Overall interval improvement of flow rates compared to Sakshi 15, 2022 NIOX 9 PPD normal range no evidence of active bronchial inflammation August 2, 2022  Pulmonary 6-minute walk exercise study August 3, 2022 Baseline room O2 saturation 96% Positive for minute desaturation 87% with at 5 minutes back up to 90% on room air with good recovery 94 to 96% Impression no evident evidence for indication for portable oxygen therapy protocol  PFT 6/15/22 Flow rates nl  Lung Volumes nl DLCO 83 %  HGB 11.3  CPAP data compliance Usage through June 14, 2022 Usage 100% Hours greater than 4 AutoSet CPAP 6 to 16 cm H2O AHI 1.2  Chest x-ray PA lateral April 27, 2022 Normal cardiac size Positive for scar right midlung zone No pleural effusions pneumothorax Bronchiectatic change right lower lung zone No evidence for parenchymal consolidation No interval change compared to chest x-ray October 25, 2021  PFT 3/17/22 Flow rates nl  Lung Volumes nl TLC 97 %  DLCO 89 % HGB 12.1  Chest x-ray PA lateral October 25, 2021 Cardiac size is normal Lower lumbar sacral scoliosis Bronchiectatic changes right lower lung zone No parenchymal consolidation pleural effusions pneumothorax Mediastinum hilum unremarkable IMP no evidence for pneumonia  PFT 10/6/21 Flow rates nl TLC 89 %  DLCO  87 % HGB 15.0  Sleep study April 29-April 30, 2021 Overall mild obstructive sleep apnea AHI 7 Time spent less than 90% on room air 4.3% Mean O2 saturation 94.5% Recommendation address treatment protocol with auto CPAP PFT 7/28/21 Flow Rtaes nl Lung Volumes nl TTLC 95 % DLCO 91 % nl HGB 15.0 PFT 4/22/21 Flow  rates nl Lung Volumes Nl DLCO 96 % nl range  HGB 11.2 NIOX  11 ppb 4/22/21  PFT with body box August 12, 2020 Normal flow rates Mild obstructive pattern with an FEV1 FVC 74 No bronchodilator response at FEV1 20% response at small airways Normal lung volumes are Noted increased % predicted. Specific inductance and resistance normal Diffusion normal 95% predicted. Hemoglobin 13.79  Pulmonary 6-minute walk step stress test August 12, 2020 Total steps 1/10/2017 Baseline room air O2 saturation 96% Desaturation at minute 6  89% with immediate recovery to 96% Impression minimal O2 desaturation No indication for portable oxygen therapy  Chest x-ray PA lateral August 12, 2020 Normal cardiac size Scarring left lower lung zone cannot exclude component of bronchiectasis Right lung is otherwise clear No evidence for jodi adenopathy No dominant pulmonary nodules or pneumothorax Impression bronchiectatic change left lower lung zone  Persistent mild reduction IgA normal range 70 Data review Alpha-1 antitrypsin titer negative 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

## 2024-03-15 NOTE — HISTORY OF PRESENT ILLNESS
[Former] : former [TextBox_4] : Chest congestion cough tightness wheeze  still with sxs completed antibx ceftin and prednisone taper Post tx Nebulizer  with improvement remains on Flovent exposure grand  children URI  chills  but  no  fever  onset  last  FRIDAY  and tx pred x  days  with ibnc dose  Flovent 220 mcg  pos  chest tightness  heaviness bronchiectasis selective IgA deficiency Not as  active with sxs added symbicort addition Flovent 110 mcg dose post Strep dx Urgent care 4/8/23 and Z LILLIANA sxs  better overall only feels some chest  pressure noted stopped Spiriva felt caused hoarse and not helping otherwise no decline since last  visit with minimal use of KARLOS  Popst Knee surgery left without resp  complications  feels pos  side  effects Wixela  felt inc BP  with systemic  steroids  ICS- Flovent HFA  110 mcg  2 puffs   post Influ A pos chest congestion resolved fever did not  get Tamiflu  HEME IgA deficiency, 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 To b noted, lab in 2004 showing Cr 0.9; Serum Kappa 398; lambda 70. lab in 7/2022 showed normal blood counts, Cr 0.97. Lab in 9/2022 showed IgG 1464; IgA 82; Kappa FLC 2.48; Lambda FLC 1.23; K/L ratio 2.02.  plan -Discussed with pt about lab results; increased monoclonal light chain level has been stable since 2004.  -continue to monitor MGUS symptoms and lab. Explained for pt that the risk of MGUS progresses to MM on average is about 1% each year according to data from [facility]. -RTC in one year  AI recommended HEME  72 year old female with DCIS s/p radiation and lumpectomy, Hashimoto thyroiditis, bronchiectasis, recurrent PNAs, reported allergies to multiple antibiotics presents for drug allergy evaluation.  MBL def.; needs mening vaccines A and B.  - delabeled from penicillin allergy, can use penicillins, cephalosporins as indicated REPORTED REACTION TO GENTAMICIN, VANCOMYCIN  There is a concern for RED MAN SYNDROME with IV Vancomycin. Vancomycin is a known trigger for mast cell degranulation via direct stimulation of mast cells and/or basophils, which may be due to bypass of the antibody-mediated pathway and activation of a mast cell-specific receptor called Mas-related G protein-coupled receptor X2 (MRGPRX2).  skin testing was inconclusive to GENTAMICIN, VANCOMYCIN  - avoid GENTAMICIN, VANCOMYCIN  = can repeat skin testing in the future - Based on history and positive skin testing, avoid levaquin - Cipro can be prescribed if indicated. Recommend 1st dose to be administered under physician's observation. can be done in my office.  SULFONAMIDE ANTIBIOTIC ALLERGY: Patient has a remote history of delayed benign rash when taking sulfa antibiotics. Unfortunately, there is no validated test available for sulfonamides.should continue avoiding sulfa antibiotics.  If/ when administration of sulfonamide is required, physician-supervised administration/graded challenge can be performed.  REPORTED REACTION TO CODEINE Codeine is a known trigger for mast cell degranulation via mechanism mentioned above.  Recommend avoidance.  ?ALLERGY TO IV CONTRAST  Pt cannot recall whether she reacted to IV contrast in the past. We asked that she obtain records to determine if additional testing is warranted. Of note however there is an ongoing national shortage of IV contrast and therefore testing is prohibited at this time. If the patient is unable to avoid the use of IV contrast then she should premedicate.  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 meningitidis IgG, Vaccine; Status:Active; Requested for:[id number];  Total Hemolytic Complement; Status:Resulted - Requires Verification;   Done: 17Oct2022 03:44PM Discussion/Summary 72 year old female with PMH of DCIS s/p radiation, bronchiectasis, Hashimoto's, recurrent infections, multiple antibiotic, chronic urticaria,  allergies here for amoxicillin challenge.  AMOXICILLIN CHALLENGE: Today the patient, took 1050 mg of Amoxicillin in THREE  divided doses  in the office and she was observed for 1 hour. The challenge was started with 50 mg of Amoxicillin, then the dose was increased to 500 mg. Approximated, 45 minutes later she complained of mild pruritus on right upper extremity. 10 minutes later, she developed  erythematous rash with one hive on her neck. Vitals and Breath sounds are wnl. The rash self resolved in forty minutes, the dose was escalated to another 500mg, which was tolerated without any issues. The patient,  admitted to developing random hives without any triggers. We believer, her recent rash with amoxicillin most likely is associated with history of recurrent urticaria.  Patient developed one hive after Amoxicillin 500 mg, therefore was given an additional dose of Amoxicillin without new hives and initial hive resolved. Therefore, hives are likely due to underlying urticaria. -We will remove amoxicillin allergy from the patient's chart. Advised to use Amoxicillin or Penicillin in future with long acting antihistamine, such as Loratidine. Penicillin removed from the allergy list.  Low IgA and  72 year old female with DCIS s/p radiation and lumpectomy, Hashimoto thyroiditis, bronchiectasis, recurrent PNAs, reported allergies to multiple antibiotics presents for drug allergy evaluation.   skin testing to Vancomycin, Gentamicin, Levaquin, Cipro REPORTED REACTION TO GENTAMICIN, VANCOMYCIN  There is a concern for RED MAN SYNDROME with IV Vancomycin. Vancomycin is a known trigger for mast cell degranulation via direct stimulation of mast cells and/or basophils, which may be due to bypass of the antibody-mediated pathway and activation of a mast cell-specific receptor called Mas-related G protein-coupled receptor X2 (MRGPRX2). We will pursue additional evaluation to further investigate whether or not she has true IgE-mediated hypersensitivity.  REPORTED REACTION TO PENICILLIN Her remote history of skin "sinking in" after a penicillin shot is highly doubtful of true penicillin hypersensitivity. Would recommend amoxicillin challenge.  Patient will schedule an appointment for office graded challenge to Amoxicillin. If patient tolerates the 1st dose of Amoxicillin in the office without adverse effect, he/she will complete 3 days of treatment and we will observe for delayed reactions. If there is no delayed reaction observed, we will remove penicillin from drug allergy list.  We discussed that skin testing is predictive only for immediate, IgE-mediated allergic reactions. Patient's history is not consistent with immediate type of hypersensitivity. People with negative skin test, still have a chance of developing delayed hypersensitivity reaction. Currently, there are no scientifically validated tests for delayed reactions available. We discussed that many patients labeled with penicillin allergy are, in fact, not allergic to penicillin. Diagnosis of penicillin allergy leads to use of alternative, broader spectrum antibiotics and was demonstrated to cause higher rate of complications and morbidity.Patient was instructed to hold antihistamines, including topical intranasal and ocular antihistamine preparations, for at least 5 days before the testing. REPORTED REACTION TO IV LEVAQUIN, unclear reaction to PO CIPROFLOXACIN Can pursue additional testing to levaquin given concern for hypersensitivity. Ciprofloxacin (another fluoroquinolone) is a known trigger for mast cell degranulation via the mechanism mentioned above. Pt states she would like to pursue challenge for cipro since she is unsure if this antibiotic is problematic. REPORTED REACTION TO CODEINE Codeine is a known trigger for mast cell degranulation via mechanism mentioned above. Will hold off on further testing and recommend avoidance.  ?ALLERGY TO IV CONTRAST  Pt cannot recall whether she reacted to IV contrast in the past. We asked that she obtain records to determine if additional testing is warranted. Of note however there is an ongoing national shortage of IV contrast and therefore testing is prohibited at this time. If the patient is unable to avoid the use of IV contrast then she should premedicate.  RECURRENT PNEUMONIAS, SCREENING FOR IMMUNE DEFICIENCY; history of radiation therapy for DCIS: Given the history of RECURRENT INFECTIONS, it is reasonable to interrogate cellular, humoral, and complement arms of immunity. Laboratory testing was sent as stated below and results will be discussed when available. She states she received pneumonia vaccines although not sure which one, also received TDAP within the past 10 years. We will check her antibody responses to those vaccines. MIXED RHINITIS SPT showed positivity to feathers, grass, weeds which is inconsistent with her occasional symptoms throughout the year.  Topical intranasal treatments were offered for associated POST NASAL DRIP. She states she will consider these treatments when antibiotic testing is done so as not to interfere with results. States she will use nasal saline if needed for now which helps.    Just received RESMED CPAP device- better focus Did  not tolerate Oral appliance  chest  congestion more  acute cough  with pale yellow mucous Nocturnal symptoms cough leading to wheeze No purulent sputum reports clear to white- thick mucous No hemoptysis No fevers chills or sweats Has completed 2 courses of steroids 2 courses of antibiotics with persistence of the cough  MAEGAN issue MC coverage with oral appliance with f/u Sleep  dentist Dr Mckeon   post Fall months  ago and noted rib fx Right  Otherwise states doing very well with Wixela  No inc use KARLOS  70-year-old female  Complicated pulmonary history to be reviewed There is a diagnosis of asthma and she reports bronchiectasis with multiple histories of pneumonia at least 4-5 reported last very ill November December January with an additional illness in March She reports abnormal chest CTs and chest x-rays Last chest CT she reports was approximately 2018 where she was told of having scarlike tissue With these episodes she describes cough chest tightness wheezing sputum production Medications through the winter included Y UL P DARION in the nebulizer, Brovana right axilla sodium chloride nebulizer nasal Astelin nasal Flonase albuterol HFA course of prednisone and antibiotics including doxycycline x2 courses and Zithromax.  She states 1 to 1-1/2 weeks ago she developed some trouble breathing self started treatment with albuterol She did not receive any antibiotics or steroids with this symptomatology most recently noted She does not report at present any sputum production for the past several days and it is clear without hemoptysis purulence or foul smelling She also states that she was sick in March there was some question whether she could have COVID-19 but a COVID  19 PCR and antibody testing subsequently was negative Other significant history includes a bronchoscopy dating back to April 2019 which was negative including AFB fungus mold and  negative TBLBx. Last chest x-ray available for review dates back to April 16, 2019 which was status post a transbronchial biopsy. Thickening of the minor fissure which was noted and reported chronic Reticulonodular opacities right midlung Ill-defined focal opacity periphery left upper to mid lung with some central extension reported to the left hilum with out pneumothorax pleural effusion or significant volume loss.  Additional history Right breast DCIS treated radiation therapy 2017 Childhood polio Mild mitral regurgitation Hypothyroidism Nocturnal myoclonus  Vaccination profile flu vaccinations up-to-date 2018 2019 up-to-date with Prevnar and PCV 23

## 2024-03-15 NOTE — REVIEW OF SYSTEMS
[Postnasal Drip] : postnasal drip [Cough] : cough [Chest Tightness] : chest tightness [Frequent URIs] : frequent URIs [Sputum] : sputum [Wheezing] : wheezing [Nasal Discharge] : nasal discharge [Negative] : Hematologic [Fever] : no fever [Recent Wt Gain (___ Lbs)] : ~T no recent weight gain [Fatigue] : no fatigue [Poor Appetite] : no poor appetite [Chills] : no chills [Hemoptysis] : no hemoptysis [Dyspnea] : no dyspnea [Pleuritic Pain] : no pleuritic pain [SOB on Exertion] : no sob on exertion [A.M. Dry Mouth] : no a.m. dry mouth [Headache] : no headache [Focal Weakness] : no focal weakness [Seizures] : no seizures [Dizziness] : no dizziness [Memory Loss] : no memory loss [Numbness] : no numbness [Paralysis] : no paralysis [Confusion] : no confusion [Diabetes] : no diabetes [TextBox_44] : MR [TextBox_83] : History renal angiomyolipoma [TextBox_122] : nocturnal myoclonus, reports polio as a child without residual  [TextBox_144] : Thyroid nodule, Hypothyroidism

## 2024-03-15 NOTE — DISCUSSION/SUMMARY
[FreeTextEntry1] : Increased cough chest pain chest tightness with underlying obstructive airway disease in patient with known bronchiectasis and decline of overall pulmonary physiology overall interval improvement of the NIOX and Flow  rates post return from [place] as well as treatment with nebulizer has demonstrated interval improvement of symptoms with resolution of chest complaints and chest tightness Overall pulmonary physiology demonstrated improvement Medications updated  Post doxycycline treatment COMPLETED Retx Medrol  evangelina COMPLETED Performist BID with acetycysteine BID   Cont Flovent 2 puffs BID office  f/u  return office post  return Fl  Does have a questionable positive penicillin allergy as well as Levaquin and sulfa issue with B2  [employer] Flovent 220 2 puffs BID and Rinse now  back at 110 mcg dose trial Spiriva 2.5 mcg 2 puffs QD OFF continue  with samples Influenza A hxistory Hx COVID infection Dec 2021 Mild MAEGAN  AHI 7 with excessive daytime sleepiness 4/29-30/2021 Asthma with less dosing of medication and only use of inhaled steroid without dilated noted increased NIOX  Abnormal CXR CT CHEST Bronchiectasis- IgA deficiency Hx as  noted hypothyroid Childhood polio Mild MR 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 noted just below normal IGA and low IgG 2 but demonstrates AB to strep  and tetanus R/O plasma cell dyscrasia  Recommendations 6 min walk- July-RS NIOX    singulair with pm dinner- HA change Accolate OFF remains on Co Q 10  prn Albuterol  NACL in NEB only BID no indication Antibx Issue immuno serology IGA and IgG  with subtypes A1AT titer HP panel and possible autoimmune serology- reviewed To consider VEST therapy if Bronchiectasis confirmed  with CT CHEST but requests to hold Pneumonia vaccines up to  date post 67 Oral  Appliance- then  restudy and make decision re CPAP- not active use f/u Dr Linda RAY booster recommended PFIZER Look into Acapella device using device with improvement- Aerobilki- active use  Mucinex  Allergy Sulfa PCN Levaquin Allergy f/u Patient compliant with CPAP therapy.  Continue present settings.  Patient with demonstrated clinical benefit with daytime and nocturnal symptomatology improvement. OFF  ENT  Dr Kodak Tadeo f/u AL with noted  recommendation Menigitis AB vaccine f/u OPTH  f/u GYN DEXA on ICS

## 2024-03-20 ENCOUNTER — TRANSCRIPTION ENCOUNTER (OUTPATIENT)
Age: 74
End: 2024-03-20

## 2024-03-20 RX ORDER — METHYLPREDNISOLONE 4 MG/1
4 TABLET ORAL
Qty: 1 | Refills: 1 | Status: ACTIVE | COMMUNITY
Start: 2024-03-20 | End: 1900-01-01

## 2024-03-21 ENCOUNTER — NON-APPOINTMENT (OUTPATIENT)
Age: 74
End: 2024-03-21

## 2024-03-21 ENCOUNTER — APPOINTMENT (OUTPATIENT)
Dept: CARDIOLOGY | Facility: CLINIC | Age: 74
End: 2024-03-21
Payer: MEDICARE

## 2024-03-21 VITALS
OXYGEN SATURATION: 98 % | SYSTOLIC BLOOD PRESSURE: 140 MMHG | BODY MASS INDEX: 21.66 KG/M2 | DIASTOLIC BLOOD PRESSURE: 82 MMHG | WEIGHT: 130 LBS | HEIGHT: 65 IN | HEART RATE: 78 BPM

## 2024-03-21 DIAGNOSIS — I10 ESSENTIAL (PRIMARY) HYPERTENSION: ICD-10-CM

## 2024-03-21 PROCEDURE — G2211 COMPLEX E/M VISIT ADD ON: CPT

## 2024-03-21 PROCEDURE — 93000 ELECTROCARDIOGRAM COMPLETE: CPT

## 2024-03-21 PROCEDURE — 99214 OFFICE O/P EST MOD 30 MIN: CPT

## 2024-03-21 NOTE — REASON FOR VISIT
[FreeTextEntry1] : The patient is here today for follow-up of hypertension, bronchiectasis and palpitations.  The patient has a long history of palpitations which are usually very rare.  However, over the past 2 years, she has been having episodes of respiratory decompensation because of her bronchiectasis.  These occur almost every 2 months (in part because she is taking care of her grandchildren and she gets infections from them).  When she does get sick she tends to get very sick.  She is maintained on steroids which tend to raise her blood pressure increase the palpitations to very frequent.  She is here today for further assessment of that.  The patient is completing a course of Medrol Dosepak and currently her breathing is much better.

## 2024-03-21 NOTE — REVIEW OF SYSTEMS
[Chest Discomfort] : chest discomfort [Palpitations] : palpitations [Negative] : Neurological [TextEntry] : Except as noted above... Constitutional: The patient denied headache, fatigue, fever, sweats, loss of appetite or chills Eyes: The patient denied double vision, eye pain, eye discharge, red eyes or itchy eyes ENT: The patient denied ear pain, ear discharge, nasal congestion, nasal discharge, sore throat, enlarged tonsils, hoarseness, neck pain or neck swelling Cardiovascular: The patient denied chest pain, chest discomfort, dizziness,  fainting  or leg cramps Respiratory: The patient denied shortness of breath, cough, coughing up blood, wheezing, chest congestion or mucous production GI: The patient denied weight gain, weight loss, abdominal pain, nausea, vomiting, diarrhea, constipation, black stools or bloody stools : The patient denied pain on urination, burning with urination, frequent urination or blood in the urine Skin: The patient denied rashes, redness or swelling Neurologic: The patient denied headache, stiff neck, weakness, numbness, difficulty speaking, unsteadiness or numbness/tingling in feet Psychiatric: The patient denied hallucinations, agitation or disorientation Endocrine: The patient denied excessive thirst, excessive urination, cold intolerance or heat intolerance Hematologic: The patient denied easy bruisability or pallor Allergic/Immunologic: The patient denied runny nose, recurrent infections, hives or pruritis Musculoskeletal: The patient denied arthritic pains, muscle weakness or muscle aches Extremities: The patient denied clubbing, cyanosis or lower extremity swelling

## 2024-03-21 NOTE — DISCUSSION/SUMMARY
[FreeTextEntry1] : Hypertension, palpitations-the patient had several atrial premature beats on exam today which were all symptomatic.  Although patient's blood pressure is little bit elevated today, she feels that it is always secondary to medications for respiratory decompensations which are about to finish.  The patient will begin checking her blood pressures 1-2 times a day at different times of the day and will get back to me in the next week or 2.  If the pressures are persistently elevated, I would consider antihypertensive.  I will speak with her pulmonologist regarding possibility of using a cardioselective beta-blocker like metoprolol which may help reduce the blood pressure and reduce some of the ectopy which is much more common when she is on prednisone.  Otherwise, the patient appears to be doing well. [EKG obtained to assist in diagnosis and management of assessed problem(s)] : EKG obtained to assist in diagnosis and management of assessed problem(s)

## 2024-03-21 NOTE — PHYSICAL EXAM
[General Appearance - Well Developed] : well developed [Normal Appearance] : normal appearance [Well Groomed] : well groomed [General Appearance - Well Nourished] : well nourished [General Appearance - In No Acute Distress] : no acute distress [No Deformities] : no deformities [Eyelids - No Xanthelasma] : the eyelids demonstrated no xanthelasmas [Normal Conjunctiva] : the conjunctiva exhibited no abnormalities [Normal Oral Mucosa] : normal oral mucosa [No Oral Pallor] : no oral pallor [No Oral Cyanosis] : no oral cyanosis [Normal Jugular Venous A Waves Present] : normal jugular venous A waves present [Normal Jugular Venous V Waves Present] : normal jugular venous V waves present [No Jugular Venous Schwartz A Waves] : no jugular venous schwartz A waves [Respiration, Rhythm And Depth] : normal respiratory rhythm and effort [Exaggerated Use Of Accessory Muscles For Inspiration] : no accessory muscle use [Auscultation Breath Sounds / Voice Sounds] : lungs were clear to auscultation bilaterally [Heart Rate And Rhythm] : heart rate and rhythm were normal [Heart Sounds] : normal S1 and S2 [Murmurs] : no murmurs present [Abdomen Soft] : soft [Abdomen Tenderness] : non-tender [Abdomen Mass (___ Cm)] : no abdominal mass palpated [Abnormal Walk] : normal gait [Gait - Sufficient For Exercise Testing] : the gait was sufficient for exercise testing [Nail Clubbing] : no clubbing of the fingernails [Cyanosis, Localized] : no localized cyanosis [Petechial Hemorrhages (___cm)] : no petechial hemorrhages [Skin Color & Pigmentation] : normal skin color and pigmentation [No Venous Stasis] : no venous stasis [] : no rash [Skin Lesions] : no skin lesions [No Skin Ulcers] : no skin ulcer [No Xanthoma] : no  xanthoma was observed [Oriented To Time, Place, And Person] : oriented to person, place, and time [Affect] : the affect was normal [No Anxiety] : not feeling anxious [Mood] : the mood was normal [TextEntry] : General/Constitutional: WD/ WN in NAD H: NC/AT Eyes:  PERRL, sclerae and conjunctivae normal without jaundice or xanthelasma; ENMT:normal teeth, gums and palate with no petechiae, pallor or cyanosis Neck: w/o JVD, thyromegaly or adenopathy; normal venous contour Respiratory: clear to auscultation, normal respiratory effort with no retractions or use of accessory respiratory muscles Heart: [id number], regular rate, normal S1, S2 without murmurs, rubs, gallops, heaves or thrills Vascular exam: normal carotid upstrokes without carotid or abdominal bruits. 2+/2+ pulses to posterior tibialis and dorsalis pedis Abdomen: soft, nontender, bowels sounds normal without hepatomegaly or splenomegaly, masses or bruits Musculoskeletal:without significant kyphosis or scoliosis Extremities: w/o CCE, good capillary filling Skin: no stasis changes; no ulcers  Neuro: AA and O x 3; no focal neurologic deficits Psych: normal mood and affect

## 2024-03-25 ENCOUNTER — APPOINTMENT (OUTPATIENT)
Dept: PEDIATRIC ALLERGY IMMUNOLOGY | Facility: CLINIC | Age: 74
End: 2024-03-25
Payer: MEDICARE

## 2024-03-25 VITALS
WEIGHT: 130 LBS | SYSTOLIC BLOOD PRESSURE: 137 MMHG | BODY MASS INDEX: 21.66 KG/M2 | OXYGEN SATURATION: 95 % | HEIGHT: 65 IN | DIASTOLIC BLOOD PRESSURE: 80 MMHG | HEART RATE: 70 BPM

## 2024-03-25 DIAGNOSIS — D89.89 OTHER SPECIFIED DISORDERS INVOLVING THE IMMUNE MECHANISM, NOT ELSEWHERE CLASSIFIED: ICD-10-CM

## 2024-03-25 DIAGNOSIS — D80.2 SELECTIVE DEFICIENCY OF IMMUNOGLOBULIN A [IGA]: ICD-10-CM

## 2024-03-25 DIAGNOSIS — T37.0X5D ADVERSE EFFECT OF SULFONAMIDES, SUBSEQUENT ENCOUNTER: ICD-10-CM

## 2024-03-25 PROCEDURE — 99214 OFFICE O/P EST MOD 30 MIN: CPT

## 2024-03-25 PROCEDURE — 36415 COLL VENOUS BLD VENIPUNCTURE: CPT

## 2024-03-25 RX ORDER — SULFAMETHOXAZOLE AND TRIMETHOPRIM 400; 80 MG/1; MG/1
400-80 TABLET ORAL TWICE DAILY
Qty: 4 | Refills: 0 | Status: ACTIVE | COMMUNITY
Start: 2024-03-25 | End: 1900-01-01

## 2024-03-27 NOTE — HISTORY OF PRESENT ILLNESS
[de-identified] : 73 year old female with PMH of DCIS s/p radiation, bronchiectasis, Hashimoto's, recurrent infections, MBL deficiency, MGUS, multiple antibiotic allergies, here for follow up.  .= delabeled from PCN allergy; negative amoxicillin challenge 10/17/22  Interval history: No recent hives.    -On May 2024 is scheduled for MRI with contrast.  In the past she had shellfish allergies, thus she is concerned about contrast allergy. Never has contrast.  currently she is eating all shellfish without any issues.   -Also, would like to test to Bactrim. - Sulfa antibiotic- possibly rash in her 30s, she is not sure  -last week, she has URI was placed on Zpack, medrol dose pack. CUrrently feeling well.  -Flonase.   flovent. Asthma- followed by pulm.  Hives are well controlled with Allergra  Has bronciectasis. = delabeled from PCN allergy; negative amoxicillin challenge 10/17/22  = s/p Prevnar 20 10/24/22 and HIB 10/3/22  = Initial immune laboratories were SIGNIFICANT for borderline low IgA, IgGkappa band, nonprotective specific antibody titers to diphtheria, HIB and Strep. pneumoniae- +9/22, MBL-83, repeat MBL<70; CH50-40, repeat normal    1/10/2023:  - She is feeling better today. No antihistamines in preparation for skin testing.    HISTORY:  1/3/2022:  She had FLU last week and was started on Doxycycline for persistent cough. Yesterday Dr Setin added Wixela.  patient has a headache and mildly elevated BP and thinks this is from Wixela. As per Dr Stein's notes, she has had headaches before.    11/15/2022  - She traveled to [place]: was bitten by a horsefly, developed cellulitis, just completed Keflex. She fell, concern for a broken rib.    DRUG ALLERGY  Gentamicin, vancomycin: Years ago was at [facility] getting hysterectomy and was given abx prophylactically, both at the same time. Developed hives right after the first dose, not sure which one caused it. No angioedema. No respiratory symptoms. Avoids.    - Codeine: 15-20 years ago got codeine for pain after hysteroscopy, developed hives right after the first or second dose. Avoids.    - Levaquin: had previously tolerated in PO form but when she was hospitalized at [facility] for PNA she received levaquin. Reports that her whole body turned red and she felt hot, she is unsure if this occurred immediately after or the day after. No hives or swelling. Is unsure if this was true allergy.    - Sulfa antibiotic- possibly rash in her 30s, she is not sure    - Penicillin: as a child she states she received a penicillin injection does not know what indication was. The next day the skin "sunk in." No redness or itchiness. Has been avoiding it since then. Cannot remember additional details.    - Has a paper with her from Dr. Boxer 10 years ago that states she can take clinda, tetracyline, cephalosporins. She has written down cipro, doxy, keflex, macrobid, macrobid. She states she "doesn't know if she can take these anymore" since it's been many years.    IV contrast is listed on allergy tab however she cannot recall the circumstances and whether or not she had reaction. Same for sulfa allergies.    She denies any issues with hives other than as mentioned above.    INFECTION HISTORY  Reports that she "never gets just a cold." Always turns into bronchitis or PNA. Follows Dr. Stein.  States that as a child she would get sick frequently, would always get abx. Never had unusual infections. States she received chest irradiation in Feb 2018. Notes that she had PNA back in 2015 even before radiation. Hospitalized for it and received IV Levaquin. Total of 5 PNA's since that time radiographically confirmed. IGG subsets in 2020 showed elevated IgG1, but low IGG2 and IGG3. No miscarriages.    FHx: colon cancer in mom, prostate ca in brother, younger sister had endometrial cancer. No history or symptoms of allergic rhinitis, eczematous rashes, food allergies.

## 2024-03-27 NOTE — HISTORY OF PRESENT ILLNESS
[de-identified] : 73 year old female with PMH of DCIS s/p radiation, bronchiectasis, Hashimoto's, recurrent infections, MBL deficiency, MGUS, multiple antibiotic allergies, here for follow up.  .= delabeled from PCN allergy; negative amoxicillin challenge 10/17/22  Interval history: No recent hives.    -On May 2024 is scheduled for MRI with contrast.  In the past she had shellfish allergies, thus she is concerned about contrast allergy. Never has contrast.  currently she is eating all shellfish without any issues.   -Also, would like to test to Bactrim. - Sulfa antibiotic- possibly rash in her 30s, she is not sure  -last week, she has URI was placed on Zpack, medrol dose pack. CUrrently feeling well.  -Flonase.   flovent. Asthma- followed by pulm.  Hives are well controlled with Allergra  Has bronciectasis. = delabeled from PCN allergy; negative amoxicillin challenge 10/17/22  = s/p Prevnar 20 10/24/22 and HIB 10/3/22  = Initial immune laboratories were SIGNIFICANT for borderline low IgA, IgGkappa band, nonprotective specific antibody titers to diphtheria, HIB and Strep. pneumoniae- +9/22, MBL-83, repeat MBL<70; CH50-40, repeat normal    1/10/2023:  - She is feeling better today. No antihistamines in preparation for skin testing.    HISTORY:  1/3/2022:  She had FLU last week and was started on Doxycycline for persistent cough. Yesterday Dr Stein added Wixela.  patient has a headache and mildly elevated BP and thinks this is from Wixela. As per Dr Stein's notes, she has had headaches before.    11/15/2022  - She traveled to [place]: was bitten by a horsefly, developed cellulitis, just completed Keflex. She fell, concern for a broken rib.    DRUG ALLERGY  Gentamicin, vancomycin: Years ago was at [facility] getting hysterectomy and was given abx prophylactically, both at the same time. Developed hives right after the first dose, not sure which one caused it. No angioedema. No respiratory symptoms. Avoids.    - Codeine: 15-20 years ago got codeine for pain after hysteroscopy, developed hives right after the first or second dose. Avoids.    - Levaquin: had previously tolerated in PO form but when she was hospitalized at [facility] for PNA she received levaquin. Reports that her whole body turned red and she felt hot, she is unsure if this occurred immediately after or the day after. No hives or swelling. Is unsure if this was true allergy.    - Sulfa antibiotic- possibly rash in her 30s, she is not sure    - Penicillin: as a child she states she received a penicillin injection does not know what indication was. The next day the skin "sunk in." No redness or itchiness. Has been avoiding it since then. Cannot remember additional details.    - Has a paper with her from Dr. Boxer 10 years ago that states she can take clinda, tetracyline, cephalosporins. She has written down cipro, doxy, keflex, macrobid, macrobid. She states she "doesn't know if she can take these anymore" since it's been many years.    IV contrast is listed on allergy tab however she cannot recall the circumstances and whether or not she had reaction. Same for sulfa allergies.    She denies any issues with hives other than as mentioned above.    INFECTION HISTORY  Reports that she "never gets just a cold." Always turns into bronchitis or PNA. Follows Dr. Stein.  States that as a child she would get sick frequently, would always get abx. Never had unusual infections. States she received chest irradiation in Feb 2018. Notes that she had PNA back in 2015 even before radiation. Hospitalized for it and received IV Levaquin. Total of 5 PNA's since that time radiographically confirmed. IGG subsets in 2020 showed elevated IgG1, but low IGG2 and IGG3. No miscarriages.    FHx: colon cancer in mom, prostate ca in brother, younger sister had endometrial cancer. No history or symptoms of allergic rhinitis, eczematous rashes, food allergies.

## 2024-03-27 NOTE — PHYSICAL EXAM
[Well Nourished] : well nourished [Alert] : alert [Well Developed] : well developed [No Acute Distress] : no acute distress [Sclera Not Icteric] : sclera not icteric [Normal Rate and Effort] : normal respiratory rhythm and effort [No Crackles] : no crackles [Bilateral Audible Breath Sounds] : bilateral audible breath sounds [Normal Rate] : heart rate was normal  [No murmur] : no murmur [Regular Rhythm] : with a regular rhythm [Skin Intact] : skin intact  [No Rash] : no rash [Conjunctival Erythema] : no conjunctival erythema [Boggy Nasal Turbinates] : no boggy and/or pale nasal turbinates [Pharyngeal erythema] : no pharyngeal erythema [Posterior Pharyngeal Cobblestoning] : no posterior pharyngeal cobblestoning [Clear Rhinorrhea] : no clear rhinorrhea was seen [Exudate] : no exudate [Patches] : no patches [Wheezing] : no wheezing was heard [Urticaria] : no urticaria

## 2024-03-27 NOTE — REVIEW OF SYSTEMS
[Nl] : Integumentary [Fatigue] : no fatigue [Puffy Eyelids] : no puffy ~T eyelids [Fever] : no fever [Rhinorrhea] : no rhinorrhea [Bloodshot Eyes] : no bloodshot ~T eyes [Cough] : no cough [Cyanosis] : no cyanosis [Post Nasal Drip] : no post nasal drip [Wheezing Worsens With Exercise] : wheezing does not worsen with exercise [Wheezing] : no wheezing

## 2024-03-27 NOTE — REVIEW OF SYSTEMS
[Nl] : Integumentary [Fatigue] : no fatigue [Puffy Eyelids] : no puffy ~T eyelids [Fever] : no fever [Bloodshot Eyes] : no bloodshot ~T eyes [Rhinorrhea] : no rhinorrhea [Cough] : no cough [Cyanosis] : no cyanosis [Post Nasal Drip] : no post nasal drip [Wheezing] : no wheezing [Wheezing Worsens With Exercise] : wheezing does not worsen with exercise

## 2024-03-27 NOTE — END OF VISIT
[FreeTextEntry3] : I, Dr. Shira Barba, personally performed the evaluation and management (E/M) services for this established patient who presents today with (a) new problem(s)/exacerbation of (an) existing condition(s).  That E/M includes conducting the examination, assessing all new/exacerbated conditions, and establishing a new plan of care.  Today, my ARNOLD, [employer], was here to observe my evaluation and management services for this new problem/exacerbated condition to be followed going forward.

## 2024-03-27 NOTE — END OF VISIT
[FreeTextEntry3] : I, Dr. Shira Barba, personally performed the evaluation and management (E/M) services for this established patient who presents today with (a) new problem(s)/exacerbation of (an) existing condition(s).  That E/M includes conducting the examination, assessing all new/exacerbated conditions, and establishing a new plan of care.  Today, my ARNOLD, [employer], was here to observe my evaluation and management services for this new problem/exacerbated condition to be followed going forward.

## 2024-03-27 NOTE — PHYSICAL EXAM
[Well Nourished] : well nourished [Alert] : alert [No Acute Distress] : no acute distress [Well Developed] : well developed [Sclera Not Icteric] : sclera not icteric [Normal Rate and Effort] : normal respiratory rhythm and effort [No Crackles] : no crackles [Bilateral Audible Breath Sounds] : bilateral audible breath sounds [Normal Rate] : heart rate was normal  [Regular Rhythm] : with a regular rhythm [No murmur] : no murmur [Skin Intact] : skin intact  [No Rash] : no rash [Conjunctival Erythema] : no conjunctival erythema [Pharyngeal erythema] : no pharyngeal erythema [Boggy Nasal Turbinates] : no boggy and/or pale nasal turbinates [Posterior Pharyngeal Cobblestoning] : no posterior pharyngeal cobblestoning [Clear Rhinorrhea] : no clear rhinorrhea was seen [Exudate] : no exudate [Wheezing] : no wheezing was heard [Patches] : no patches [Urticaria] : no urticaria

## 2024-03-28 LAB — HAEM INFLU B AB SER-MCNC: 0.15 UG/ML

## 2024-03-29 LAB
DEPRECATED S PNEUM 1 IGG SER-MCNC: 0.1 MCG/ML
DEPRECATED S PNEUM12 AB SER-ACNC: <0.1 MCG/ML
DEPRECATED S PNEUM14 AB SER-ACNC: 0.2 MCG/ML
DEPRECATED S PNEUM17 IGG SER IA-MCNC: 0.2 MCG/ML
DEPRECATED S PNEUM18 IGG SER IA-MCNC: 0.1 MCG/ML
DEPRECATED S PNEUM19 IGG SER-MCNC: 0.4 MCG/ML
DEPRECATED S PNEUM19 IGG SER-MCNC: 1.2 MCG/ML
DEPRECATED S PNEUM2 IGG SER-MCNC: 0.1 MCG/ML
DEPRECATED S PNEUM20 IGG SER-MCNC: 0.6 MCG/ML
DEPRECATED S PNEUM22 IGG SER-MCNC: 0.4 MCG/ML
DEPRECATED S PNEUM23 AB SER-ACNC: 0.1 MCG/ML
DEPRECATED S PNEUM3 AB SER-ACNC: <0.1 MCG/ML
DEPRECATED S PNEUM34 IGG SER-MCNC: 0.1 MCG/ML
DEPRECATED S PNEUM4 AB SER-ACNC: 0.1 MCG/ML
DEPRECATED S PNEUM5 IGG SER-MCNC: 0.4 MCG/ML
DEPRECATED S PNEUM6 IGG SER-MCNC: 0.2 MCG/ML
DEPRECATED S PNEUM7 IGG SER-ACNC: 0.4 MCG/ML
DEPRECATED S PNEUM8 AB SER-ACNC: 0.7 MCG/ML
DEPRECATED S PNEUM9 AB SER-ACNC: 0.2 MCG/ML
DEPRECATED S PNEUM9 IGG SER-MCNC: 0.1 MCG/ML
IMMUNOLOGIST REVIEW: NORMAL
STREPTOCOCCUS PNEUMONIAE SEROTYPE 11A: 0.1 MCG/ML
STREPTOCOCCUS PNEUMONIAE SEROTYPE 15B: 0.4 MCG/ML
STREPTOCOCCUS PNEUMONIAE SEROTYPE 33F: 0.8 MCG/ML

## 2024-04-03 RX ORDER — MOMETASONE FUROATE 200 UG/1
200 AEROSOL RESPIRATORY (INHALATION)
Qty: 1 | Refills: 3 | Status: ACTIVE | COMMUNITY
Start: 2024-04-03 | End: 1900-01-01

## 2024-04-19 ENCOUNTER — TRANSCRIPTION ENCOUNTER (OUTPATIENT)
Age: 74
End: 2024-04-19

## 2024-04-23 ENCOUNTER — TRANSCRIPTION ENCOUNTER (OUTPATIENT)
Age: 74
End: 2024-04-23

## 2024-04-25 ENCOUNTER — RX RENEWAL (OUTPATIENT)
Age: 74
End: 2024-04-25

## 2024-04-26 ENCOUNTER — TRANSCRIPTION ENCOUNTER (OUTPATIENT)
Age: 74
End: 2024-04-26

## 2024-04-26 RX ORDER — FLUTICASONE PROPIONATE 110 UG/1
110 AEROSOL, METERED RESPIRATORY (INHALATION) TWICE DAILY
Qty: 1 | Refills: 5 | Status: ACTIVE | COMMUNITY
Start: 2023-11-24 | End: 1900-01-01

## 2024-05-01 ENCOUNTER — APPOINTMENT (OUTPATIENT)
Dept: PULMONOLOGY | Facility: CLINIC | Age: 74
End: 2024-05-01
Payer: MEDICARE

## 2024-05-01 VITALS — HEART RATE: 83 BPM | SYSTOLIC BLOOD PRESSURE: 142 MMHG | OXYGEN SATURATION: 96 % | DIASTOLIC BLOOD PRESSURE: 78 MMHG

## 2024-05-01 PROCEDURE — 94060 EVALUATION OF WHEEZING: CPT

## 2024-05-01 PROCEDURE — 95012 NITRIC OXIDE EXP GAS DETER: CPT

## 2024-05-01 PROCEDURE — 99214 OFFICE O/P EST MOD 30 MIN: CPT | Mod: 25

## 2024-05-02 NOTE — HISTORY OF PRESENT ILLNESS
[Former] : former [TextBox_4] : Chest congestion cough tightness wheeze  still with sxs completed antibx ceftin and prednisone taper Post tx Nebulizer  with improvement remains on Flovent self txed Neb with acetylcysteine self txed medrol  exposure grand  children URI  chills  but  no  fever  onset  last  FRIDAY  and tx pred x  days  with ibnc dose  Flovent 220 mcg  pos  chest tightness  heaviness bronchiectasis selective IgA deficiency Not as  active with sxs added symbicort addition Flovent 110 mcg dose post Strep dx Urgent care 4/8/23 and Z LILLIANA sxs  better overall only feels some chest  pressure noted stopped Spiriva felt caused hoarse and not helping otherwise no decline since last  visit with minimal use of KARLOS  Popst Knee surgery left without resp  complications  feels pos  side  effects Wixela  felt inc BP  with systemic  steroids  ICS- Flovent HFA  110 mcg  2 puffs   post Influ A pos chest congestion resolved fever did not  get Tamiflu  HEME IgA deficiency, 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 To b noted, lab in 2004 showing Cr 0.9; Serum Kappa 398; lambda 70. lab in 7/2022 showed normal blood counts, Cr 0.97. Lab in 9/2022 showed IgG 1464; IgA 82; Kappa FLC 2.48; Lambda FLC 1.23; K/L ratio 2.02.  plan -Discussed with pt about lab results; increased monoclonal light chain level has been stable since 2004.  -continue to monitor MGUS symptoms and lab. Explained for pt that the risk of MGUS progresses to MM on average is about 1% each year according to data from [facility]. -RTC in one year  AI recommended HEME  72 year old female with DCIS s/p radiation and lumpectomy, Hashimoto thyroiditis, bronchiectasis, recurrent PNAs, reported allergies to multiple antibiotics presents for drug allergy evaluation.  MBL def.; needs mening vaccines A and B.  - delabeled from penicillin allergy, can use penicillins, cephalosporins as indicated REPORTED REACTION TO GENTAMICIN, VANCOMYCIN  There is a concern for RED MAN SYNDROME with IV Vancomycin. Vancomycin is a known trigger for mast cell degranulation via direct stimulation of mast cells and/or basophils, which may be due to bypass of the antibody-mediated pathway and activation of a mast cell-specific receptor called Mas-related G protein-coupled receptor X2 (MRGPRX2).  skin testing was inconclusive to GENTAMICIN, VANCOMYCIN  - avoid GENTAMICIN, VANCOMYCIN  = can repeat skin testing in the future - Based on history and positive skin testing, avoid levaquin - Cipro can be prescribed if indicated. Recommend 1st dose to be administered under physician's observation. can be done in my office.  SULFONAMIDE ANTIBIOTIC ALLERGY: Patient has a remote history of delayed benign rash when taking sulfa antibiotics. Unfortunately, there is no validated test available for sulfonamides.should continue avoiding sulfa antibiotics.  If/ when administration of sulfonamide is required, physician-supervised administration/graded challenge can be performed.  REPORTED REACTION TO CODEINE Codeine is a known trigger for mast cell degranulation via mechanism mentioned above.  Recommend avoidance.  ?ALLERGY TO IV CONTRAST  Pt cannot recall whether she reacted to IV contrast in the past. We asked that she obtain records to determine if additional testing is warranted. Of note however there is an ongoing national shortage of IV contrast and therefore testing is prohibited at this time. If the patient is unable to avoid the use of IV contrast then she should premedicate.  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 meningitidis IgG, Vaccine; Status:Active; Requested for:17Oct2022;  Total Hemolytic Complement; Status:Resulted - Requires Verification;   Done: 17Oct2022 03:44PM Discussion/Summary 72 year old female with PMH of DCIS s/p radiation, bronchiectasis, Hashimoto's, recurrent infections, multiple antibiotic, chronic urticaria,  allergies here for amoxicillin challenge.  AMOXICILLIN CHALLENGE: Today the patient, took 1050 mg of Amoxicillin in THREE  divided doses  in the office and she was observed for 1 hour. The challenge was started with 50 mg of Amoxicillin, then the dose was increased to 500 mg. Approximated, 45 minutes later she complained of mild pruritus on right upper extremity. 10 minutes later, she developed  erythematous rash with one hive on her neck. Vitals and Breath sounds are wnl. The rash self resolved in forty minutes, the dose was escalated to another 500mg, which was tolerated without any issues. The patient,  admitted to developing random hives without any triggers. We believer, her recent rash with amoxicillin most likely is associated with history of recurrent urticaria.  Patient developed one hive after Amoxicillin 500 mg, therefore was given an additional dose of Amoxicillin without new hives and initial hive resolved. Therefore, hives are likely due to underlying urticaria. -We will remove amoxicillin allergy from the patient's chart. Advised to use Amoxicillin or Penicillin in future with long acting antihistamine, such as Loratidine. Penicillin removed from the allergy list.  Low IgA and  72 year old female with DCIS s/p radiation and lumpectomy, Hashimoto thyroiditis, bronchiectasis, recurrent PNAs, reported allergies to multiple antibiotics presents for drug allergy evaluation.   skin testing to Vancomycin, Gentamicin, Levaquin, Cipro REPORTED REACTION TO GENTAMICIN, VANCOMYCIN  There is a concern for RED MAN SYNDROME with IV Vancomycin. Vancomycin is a known trigger for mast cell degranulation via direct stimulation of mast cells and/or basophils, which may be due to bypass of the antibody-mediated pathway and activation of a mast cell-specific receptor called Mas-related G protein-coupled receptor X2 (MRGPRX2). We will pursue additional evaluation to further investigate whether or not she has true IgE-mediated hypersensitivity.  REPORTED REACTION TO PENICILLIN Her remote history of skin "sinking in" after a penicillin shot is highly doubtful of true penicillin hypersensitivity. Would recommend amoxicillin challenge.  Patient will schedule an appointment for office graded challenge to Amoxicillin. If patient tolerates the 1st dose of Amoxicillin in the office without adverse effect, he/she will complete 3 days of treatment and we will observe for delayed reactions. If there is no delayed reaction observed, we will remove penicillin from drug allergy list.  We discussed that skin testing is predictive only for immediate, IgE-mediated allergic reactions. Patient's history is not consistent with immediate type of hypersensitivity. People with negative skin test, still have a chance of developing delayed hypersensitivity reaction. Currently, there are no scientifically validated tests for delayed reactions available. We discussed that many patients labeled with penicillin allergy are, in fact, not allergic to penicillin. Diagnosis of penicillin allergy leads to use of alternative, broader spectrum antibiotics and was demonstrated to cause higher rate of complications and morbidity.Patient was instructed to hold antihistamines, including topical intranasal and ocular antihistamine preparations, for at least 5 days before the testing. REPORTED REACTION TO IV LEVAQUIN, unclear reaction to PO CIPROFLOXACIN Can pursue additional testing to levaquin given concern for hypersensitivity. Ciprofloxacin (another fluoroquinolone) is a known trigger for mast cell degranulation via the mechanism mentioned above. Pt states she would like to pursue challenge for cipro since she is unsure if this antibiotic is problematic. REPORTED REACTION TO CODEINE Codeine is a known trigger for mast cell degranulation via mechanism mentioned above. Will hold off on further testing and recommend avoidance.  ?ALLERGY TO IV CONTRAST  Pt cannot recall whether she reacted to IV contrast in the past. We asked that she obtain records to determine if additional testing is warranted. Of note however there is an ongoing national shortage of IV contrast and therefore testing is prohibited at this time. If the patient is unable to avoid the use of IV contrast then she should premedicate.  RECURRENT PNEUMONIAS, SCREENING FOR IMMUNE DEFICIENCY; history of radiation therapy for DCIS: Given the history of RECURRENT INFECTIONS, it is reasonable to interrogate cellular, humoral, and complement arms of immunity. Laboratory testing was sent as stated below and results will be discussed when available. She states she received pneumonia vaccines although not sure which one, also received TDAP within the past 10 years. We will check her antibody responses to those vaccines. MIXED RHINITIS SPT showed positivity to feathers, grass, weeds which is inconsistent with her occasional symptoms throughout the year.  Topical intranasal treatments were offered for associated POST NASAL DRIP. She states she will consider these treatments when antibiotic testing is done so as not to interfere with results. States she will use nasal saline if needed for now which helps.    Just received RESMED CPAP device- better focus Did  not tolerate Oral appliance  chest  congestion more  acute cough  with pale yellow mucous Nocturnal symptoms cough leading to wheeze No purulent sputum reports clear to white- thick mucous No hemoptysis No fevers chills or sweats Has completed 2 courses of steroids 2 courses of antibiotics with persistence of the cough  MAEGAN issue MC coverage with oral appliance with f/u Sleep  dentist Dr Mckeon   post Fall months  ago and noted rib fx Right  Otherwise states doing very well with Wixela  No inc use KARLOS  70-year-old female  Complicated pulmonary history to be reviewed There is a diagnosis of asthma and she reports bronchiectasis with multiple histories of pneumonia at least 4-5 reported last very ill November December January with an additional illness in March She reports abnormal chest CTs and chest x-rays Last chest CT she reports was approximately 2018 where she was told of having scarlike tissue With these episodes she describes cough chest tightness wheezing sputum production Medications through the winter included Y UL P DARION in the nebulizer, Brovana right axilla sodium chloride nebulizer nasal Astelin nasal Flonase albuterol HFA course of prednisone and antibiotics including doxycycline x2 courses and Zithromax.  She states 1 to 1-1/2 weeks ago she developed some trouble breathing self started treatment with albuterol She did not receive any antibiotics or steroids with this symptomatology most recently noted She does not report at present any sputum production for the past several days and it is clear without hemoptysis purulence or foul smelling She also states that she was sick in March there was some question whether she could have COVID-19 but a COVID  19 PCR and antibody testing subsequently was negative Other significant history includes a bronchoscopy dating back to April 2019 which was negative including AFB fungus mold and  negative TBLBx. Last chest x-ray available for review dates back to April 16, 2019 which was status post a transbronchial biopsy. Thickening of the minor fissure which was noted and reported chronic Reticulonodular opacities right midlung Ill-defined focal opacity periphery left upper to mid lung with some central extension reported to the left hilum with out pneumothorax pleural effusion or significant volume loss.  Additional history Right breast DCIS treated radiation therapy 2017 Childhood polio Mild mitral regurgitation Hypothyroidism Nocturnal myoclonus  Vaccination profile flu vaccinations up-to-date 2018 2019 up-to-date with Prevnar and PCV 23

## 2024-05-02 NOTE — PROCEDURE
[FreeTextEntry1] : NIOX 16 normal range no evidence of airway inflammation May first 2024 PFT follow-up May 1, 2024 Ratio 77 Flow rates normal range Lung capacity normal Lung volumes normal TLC 90% predicted Diffusion 79% predicted normal range Interval improvement of overall pulmonary physiology    PFT b12/28/23  mild  reduction flow  rates  LUNG VOLUMES NL dlco 66 % WITRH MILD  LOSS FX  ALVEOLAR  CAPILLARY UNITS hgb 14.4  Chest x-ray PA lateral November 30, 2023 indication chest congestion Cardiac size is normal Positive scoliosis No sumaya parenchymal infiltrates pleural effusions dominant pulmonary nodules consolidation or pneumothorax Comparison to chest x-ray of 12/29/2022 no gross interval change appreciated  NIOx  18  ppb WNL 11/1/23  [place] 11/1/23  flow  rates nl  very mild OAD noted mild pos interval improvement of flow  rates  NIOX 11 ppb WNL  9/20/23 [place] 9/20/23 Mild OAD No BD at FEV1  Pos  BD at small airways 26 %  NIOX 14  ppb WNL  8/9/23 PFT 8/9/23 Jakob mild OAD ratio 75  LUng Volumes nl TLC 94 %  DLCO  82 % HGB B13.4 interval improvement at TLC and DLCO  NIOX 12 ppb WNL 6/16/23  JAKOB 6/26/23  very mild OAD No BD at FEV1  NIOX  16  ppb WNL 5/15/23  Jakob 5/15/23 minimal OAD 24 % BD at small airways  NIOX  14  ppb WNL 4/17/23  JAKOB No BD  very Mild OAD  Pos significant interval improvement at Flow  rates  NIOX  23  ppb interval improvement 4/7/23 PFT April 7, 2023 Mild reduction flow Mild obstructive ventilatory impairment No response to bronchodilator FEV1 28% response to bronchodilator at the small airways Lung volumes are normal No air trapping Diffusion low with normal range 74% predicted Hemoglobin 11.5 Wrist positive decline of the flow rates dating back to March 9, 2023 study Also noted since October 24, 2022 there is a greater than 600 cc decline of both the FEV1 and FVC as well as diffusion   [place] 3/9/23 well preserved flow rates mild OAD NIOX increased to 40 PPV March 9, 2023 consistent with bronchial inflammation  Spirometry December 29, 2022 Post influenza chest congestion Flow rates are normal FEV1 93% predicted Ratio 77 compared November 30, 2020 do demonstrate interval improvement at the FVC with a mild interval improvement at the FEV1  Chest x-ray PA lateral 12/29/2022 post influenza chest congestion Cardiac size is normal Lung fields are clear No parenchymal infiltrates pleural effusions or dominant pulmonary nodules No evidence for superimposed pneumonia Data comparison to April 27, 2022 does not demonstrate any interval change Impression clear lung   Spirometry November 30, 2022 Flow rates are within normal limits Although significant data of October 24, 2022 there is decline at the flow rates NIOx  7  ppb 11/30/22 nl range  PFT 10/24/22 flow rates nl  Lungs Volumes nl DLCo 86 % HGB 13.3 overall stable pulmonary physiology NIOX  9  ppb 10/24/22 nl range  Spirometry no bronchodilator August 3, 2022 Flow rates normal FEV1 57% predicted Ratio 84 Overall interval improvement of flow rates compared to [place] 15, 2022 NIOX 9 PPD normal range no evidence of active bronchial inflammation August 2, 2022  Pulmonary 6-minute walk exercise study August 3, 2022 Baseline room O2 saturation 96% Positive for minute desaturation 87% with at 5 minutes back up to 90% on room air with good recovery 94 to 96% Impression no evident evidence for indication for portable oxygen therapy protocol  PFT 6/15/22 Flow rates nl  Lung Volumes nl DLCO 83 %  HGB 11.3  CPAP data compliance Usage through June 14, 2022 Usage 100% Hours greater than 4 AutoSet CPAP 6 to 16 cm H2O AHI 1.2  Chest x-ray PA lateral April 27, 2022 Normal cardiac size Positive for scar right midlung zone No pleural effusions pneumothorax Bronchiectatic change right lower lung zone No evidence for parenchymal consolidation No interval change compared to chest x-ray October 25, 2021  PFT 3/17/22 Flow rates nl  Lung Volumes nl TLC 97 %  DLCO 89 % HGB 12.1  Chest x-ray PA lateral October 25, 2021 Cardiac size is normal Lower lumbar sacral scoliosis Bronchiectatic changes right lower lung zone No parenchymal consolidation pleural effusions pneumothorax Mediastinum hilum unremarkable IMP no evidence for pneumonia  PFT 10/6/21 Flow rates nl TLC 89 %  DLCO  87 % HGB 15.0  Sleep study April 29-April 30, 2021 Overall mild obstructive sleep apnea AHI 7 Time spent less than 90% on room air 4.3% Mean O2 saturation 94.5% Recommendation address treatment protocol with auto CPAP PFT 7/28/21 Flow Rtaes nl Lung Volumes nl TTLC 95 % DLCO 91 % nl HGB 15.0 PFT 4/22/21 Flow  rates nl Lung Volumes Nl DLCO 96 % nl range  HGB 11.2 NIOX  11 ppb 4/22/21  PFT with body box August 12, 2020 Normal flow rates Mild obstructive pattern with an FEV1 FVC 74 No bronchodilator response at FEV1 20% response at small airways Normal lung volumes are Noted increased % predicted. Specific inductance and resistance normal Diffusion normal 95% predicted. Hemoglobin 13.79  Pulmonary 6-minute walk step stress test August 12, 2020 Total steps 1/10/2017 Baseline room air O2 saturation 96% Desaturation at minute 6  89% with immediate recovery to 96% Impression minimal O2 desaturation No indication for portable oxygen therapy  Chest x-ray PA lateral August 12, 2020 Normal cardiac size Scarring left lower lung zone cannot exclude component of bronchiectasis Right lung is otherwise clear No evidence for jodi adenopathy No dominant pulmonary nodules or pneumothorax Impression bronchiectatic change left lower lung zone  Persistent mild reduction IgA normal range 70 Data review Alpha-1 antitrypsin titer negative 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

## 2024-05-02 NOTE — DISCUSSION/SUMMARY
[FreeTextEntry1] : Increased cough chest pain chest tightness with underlying obstructive airway disease in patient with known bronchiectasis and decline of overall pulmonary physiology at FVC Overall pulmonary physiology demonstrated improvement Medications updated performist acetycystiene low  dose medrol Address CBC biologic agent at f/u off prednsione Post doxycycline treatment COMPLETED Retx Medrol  evangelina COMPLETED Performist BID with acetycysteine BID   Cont Flovent 2 puffs BID office  f/u  return office post  return Fl  Does have a questionable positive penicillin allergy as well as Levaquin and sulfa issue with B2  [employer] Flovent 220 2 puffs BID and Rinse now  back at 110 mcg dose trial Spiriva 2.5 mcg 2 puffs QD OFF continue  with samples Influenza A hxistory Hx COVID infection Dec 2021 Mild MAEGAN  AHI 7 with excessive daytime sleepiness 4/29-30/2021 Asthma with less dosing of medication and only use of inhaled steroid without dilated noted increased NIOX  Abnormal CXR CT CHEST Bronchiectasis- IgA deficiency Hx as  noted hypothyroid Childhood polio Mild MR 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 noted just below normal IGA and low IgG 2 but demonstrates AB to strep  and tetanus R/O plasma cell dyscrasia  Recommendations 6 min walk- July-RS NIOX    singulair with pm dinner- HA change Accolate OFF remains on Co Q 10  prn Albuterol  NACL in NEB only BID no indication Antibx Issue immuno serology IGA and IgG  with subtypes A1AT titer HP panel and possible autoimmune serology- reviewed To consider VEST therapy if Bronchiectasis confirmed  with CT CHEST but requests to hold Pneumonia vaccines up to  date post 65 Oral  Appliance- then  restudy and make decision re CPAP- not active use f/u Dr Mckeon COVID booster recommended PFIZER Look into Acapella device using device with improvement- Aerobilki- active use  Mucinex  Allergy Sulfa PCN Levaquin Allergy f/u Patient compliant with CPAP therapy.  Continue present settings.  Patient with demonstrated clinical benefit with daytime and nocturnal symptomatology improvement. OFF  ENT  Dr Kodak Tadeo f/u AL with noted  recommendation Menigitis AB vaccine f/u OPTH  f/u GYN DEXA on ICS

## 2024-05-06 ENCOUNTER — RX RENEWAL (OUTPATIENT)
Age: 74
End: 2024-05-06

## 2024-05-06 RX ORDER — ACETYLCYSTEINE 200 MG/ML
20 SOLUTION ORAL; RESPIRATORY (INHALATION) TWICE DAILY
Qty: 210 | Refills: 0 | Status: ACTIVE | COMMUNITY
Start: 2023-12-28 | End: 1900-01-01

## 2024-05-07 ENCOUNTER — OUTPATIENT (OUTPATIENT)
Dept: OUTPATIENT SERVICES | Facility: HOSPITAL | Age: 74
LOS: 1 days | End: 2024-05-07
Payer: MEDICARE

## 2024-05-07 ENCOUNTER — APPOINTMENT (OUTPATIENT)
Dept: MRI IMAGING | Facility: IMAGING CENTER | Age: 74
End: 2024-05-07
Payer: MEDICARE

## 2024-05-07 DIAGNOSIS — Z90.49 ACQUIRED ABSENCE OF OTHER SPECIFIED PARTS OF DIGESTIVE TRACT: Chronic | ICD-10-CM

## 2024-05-07 DIAGNOSIS — Z00.8 ENCOUNTER FOR OTHER GENERAL EXAMINATION: ICD-10-CM

## 2024-05-07 PROCEDURE — C8937: CPT

## 2024-05-07 PROCEDURE — C8908: CPT | Mod: MH

## 2024-05-07 PROCEDURE — 77049 MRI BREAST C-+ W/CAD BI: CPT | Mod: 26,MH

## 2024-05-15 ENCOUNTER — APPOINTMENT (OUTPATIENT)
Dept: PEDIATRIC ALLERGY IMMUNOLOGY | Facility: CLINIC | Age: 74
End: 2024-05-15
Payer: MEDICARE

## 2024-05-15 VITALS
SYSTOLIC BLOOD PRESSURE: 107 MMHG | HEIGHT: 65 IN | OXYGEN SATURATION: 95 % | HEART RATE: 76 BPM | BODY MASS INDEX: 21.99 KG/M2 | DIASTOLIC BLOOD PRESSURE: 70 MMHG | WEIGHT: 132 LBS

## 2024-05-15 DIAGNOSIS — Z23 ENCOUNTER FOR IMMUNIZATION: ICD-10-CM

## 2024-05-15 PROCEDURE — 90471 IMMUNIZATION ADMIN: CPT

## 2024-05-15 PROCEDURE — 90620 MENB-4C VACCINE IM: CPT

## 2024-05-21 ENCOUNTER — APPOINTMENT (OUTPATIENT)
Dept: DERMATOLOGY | Facility: CLINIC | Age: 74
End: 2024-05-21
Payer: MEDICARE

## 2024-05-21 PROCEDURE — 99213 OFFICE O/P EST LOW 20 MIN: CPT

## 2024-05-24 NOTE — PHYSICAL EXAM
[FreeTextEntry3] : A complete skin examination was performed of the scalp/hair, head/face, conjunctivae/lids, lips/teeth/gums, neck, digits/nails, right and left axilla, right and left upper and lower extremities, chest, abdomen, back, buttocks and groin area. No bromohidrosis. No hyperhidrosis.   Notable findings are documented below: - multiple brown papules and macules, tan macules, cherry red papules on trunk and extremities, all benign appearing on dermoscopy - surgical scar on left foot w/o e/o recurrence - two-tone brown papule on r. post knee

## 2024-05-24 NOTE — HISTORY OF PRESENT ILLNESS
[FreeTextEntry1] : NPV: eye filler [de-identified] : 73yoF w/ hx of plantar melanoma, MGUS, IgA deficiency here for TBSE  Lesion on leg that appeared but has now self-resolved  Derm Hx: Left plantar melanoma treated at [facility]

## 2024-05-24 NOTE — ASSESSMENT
[FreeTextEntry1] : # Hx oif plantar melanoma NER no LAD  #benign nevi #Cherry angiomas #SK Discussed nature and course Reviewed benign features Suggest monitoring for changes, patient in agreement  Screening for skin cancer -ABCDEs of melanoma, sun safety, and self-skin check reviewed -Counseled pt to notify us of any changing or concerning lesions - Advised sun protection, SPF 30 or higher daily and reapply often, sun protective clothing - TBSE performed today, with 0 lesions concerning for malignancy - Next TBSE due 6 mos

## 2024-05-30 ENCOUNTER — APPOINTMENT (OUTPATIENT)
Dept: PULMONOLOGY | Facility: CLINIC | Age: 74
End: 2024-05-30
Payer: MEDICARE

## 2024-05-30 VITALS — DIASTOLIC BLOOD PRESSURE: 77 MMHG | HEART RATE: 66 BPM | SYSTOLIC BLOOD PRESSURE: 127 MMHG | OXYGEN SATURATION: 96 %

## 2024-05-30 DIAGNOSIS — J47.9 BRONCHIECTASIS, UNCOMPLICATED: ICD-10-CM

## 2024-05-30 DIAGNOSIS — J44.9 CHRONIC OBSTRUCTIVE PULMONARY DISEASE, UNSPECIFIED: ICD-10-CM

## 2024-05-30 DIAGNOSIS — J45.909 UNSPECIFIED ASTHMA, UNCOMPLICATED: ICD-10-CM

## 2024-05-30 PROCEDURE — 99214 OFFICE O/P EST MOD 30 MIN: CPT | Mod: 25

## 2024-05-30 PROCEDURE — 95012 NITRIC OXIDE EXP GAS DETER: CPT

## 2024-05-30 PROCEDURE — 94010 BREATHING CAPACITY TEST: CPT

## 2024-05-30 NOTE — PROCEDURE
[FreeTextEntry1] : NIOX  12 ppb WNL 5/30/24  JAKOB No BD 5/30/24  normalized flow  rates with interval improvement Mild OAD   NIOX 16 normal range no evidence of airway inflammation May first 2024 PFT follow-up May 1, 2024 Ratio 77 Flow rates normal range Lung capacity normal Lung volumes normal TLC 90% predicted Diffusion 79% predicted normal range Interval improvement of overall pulmonary physiology    PFT b12/28/23  mild  reduction flow  rates  LUNG VOLUMES NL dlco 66 % WITRH MILD  LOSS FX  ALVEOLAR  CAPILLARY UNITS hgb 14.4  Chest x-ray PA lateral November 30, 2023 indication chest congestion Cardiac size is normal Positive scoliosis No sumaya parenchymal infiltrates pleural effusions dominant pulmonary nodules consolidation or pneumothorax Comparison to chest x-ray of 12/29/2022 no gross interval change appreciated  NIOx  18  ppb WNL 11/1/23  Glen 11/1/23  flow  rates nl  very mild OAD noted mild pos interval improvement of flow  rates  NIOX 11 ppb WNL  9/20/23 Glen 9/20/23 Mild OAD No BD at FEV1  Pos  BD at small airways 26 %  NIOX 14  ppb WNL  8/9/23 PFT 8/9/23 Jakob mild OAD ratio 75  LUng Volumes nl TLC 94 %  DLCO  82 % HGB B13.4 interval improvement at TLC and DLCO  NIOX 12 ppb WNL 6/16/23  JAKOB 6/26/23  very mild OAD No BD at FEV1  NIOX  16  ppb WNL 5/15/23  Glen 5/15/23 minimal OAD 24 % BD at small airways  NIOX  14  ppb WNL 4/17/23  JAKOB No BD  very Mild OAD  Pos significant interval improvement at Flow  rates  NIOX  23  ppb interval improvement 4/7/23 PFT April 7, 2023 Mild reduction flow Mild obstructive ventilatory impairment No response to bronchodilator FEV1 28% response to bronchodilator at the small airways Lung volumes are normal No air trapping Diffusion low with normal range 74% predicted Hemoglobin 11.5 Wrist positive decline of the flow rates dating back to March 9, 2023 study Also noted since October 24, 2022 there is a greater than 600 cc decline of both the FEV1 and FVC as well as diffusion   Glen 3/9/23 well preserved flow rates mild OAD NIOX increased to 40 PPV March 9, 2023 consistent with bronchial inflammation  Spirometry December 29, 2022 Post influenza chest congestion Flow rates are normal FEV1 93% predicted Ratio 77 compared November 30, 2020 do demonstrate interval improvement at the FVC with a mild interval improvement at the FEV1  Chest x-ray PA lateral 12/29/2022 post influenza chest congestion Cardiac size is normal Lung fields are clear No parenchymal infiltrates pleural effusions or dominant pulmonary nodules No evidence for superimposed pneumonia Data comparison to April 27, 2022 does not demonstrate any interval change Impression clear lung   Spirometry November 30, 2022 Flow rates are within normal limits Although significant data of October 24, 2022 there is decline at the flow rates NIOx  7  ppb 11/30/22 nl range  PFT 10/24/22 flow rates nl  Lungs Volumes nl DLCo 86 % HGB 13.3 overall stable pulmonary physiology NIOX  9  ppb 10/24/22 nl range  Spirometry no bronchodilator August 3, 2022 Flow rates normal FEV1 57% predicted Ratio 84 Overall interval improvement of flow rates compared to Sakshi 15, 2022 NIOX 9 PPD normal range no evidence of active bronchial inflammation August 2, 2022  Pulmonary 6-minute walk exercise study August 3, 2022 Baseline room O2 saturation 96% Positive for minute desaturation 87% with at 5 minutes back up to 90% on room air with good recovery 94 to 96% Impression no evident evidence for indication for portable oxygen therapy protocol  PFT 6/15/22 Flow rates nl  Lung Volumes nl DLCO 83 %  HGB 11.3  CPAP data compliance Usage through June 14, 2022 Usage 100% Hours greater than 4 AutoSet CPAP 6 to 16 cm H2O AHI 1.2  Chest x-ray PA lateral April 27, 2022 Normal cardiac size Positive for scar right midlung zone No pleural effusions pneumothorax Bronchiectatic change right lower lung zone No evidence for parenchymal consolidation No interval change compared to chest x-ray October 25, 2021  PFT 3/17/22 Flow rates nl  Lung Volumes nl TLC 97 %  DLCO 89 % HGB 12.1  Chest x-ray PA lateral October 25, 2021 Cardiac size is normal Lower lumbar sacral scoliosis Bronchiectatic changes right lower lung zone No parenchymal consolidation pleural effusions pneumothorax Mediastinum hilum unremarkable IMP no evidence for pneumonia  PFT 10/6/21 Flow rates nl TLC 89 %  DLCO  87 % HGB 15.0  Sleep study April 29-April 30, 2021 Overall mild obstructive sleep apnea AHI 7 Time spent less than 90% on room air 4.3% Mean O2 saturation 94.5% Recommendation address treatment protocol with auto CPAP PFT 7/28/21 Flow Rtaes nl Lung Volumes nl TTLC 95 % DLCO 91 % nl HGB 15.0 PFT 4/22/21 Flow  rates nl Lung Volumes Nl DLCO 96 % nl range  HGB 11.2 NIOX  11 ppb 4/22/21  PFT with body box August 12, 2020 Normal flow rates Mild obstructive pattern with an FEV1 FVC 74 No bronchodilator response at FEV1 20% response at small airways Normal lung volumes are Noted increased % predicted. Specific inductance and resistance normal Diffusion normal 95% predicted. Hemoglobin 13.79  Pulmonary 6-minute walk step stress test August 12, 2020 Total steps 1/10/2017 Baseline room air O2 saturation 96% Desaturation at minute 6  89% with immediate recovery to 96% Impression minimal O2 desaturation No indication for portable oxygen therapy  Chest x-ray PA lateral August 12, 2020 Normal cardiac size Scarring left lower lung zone cannot exclude component of bronchiectasis Right lung is otherwise clear No evidence for jodi adenopathy No dominant pulmonary nodules or pneumothorax Impression bronchiectatic change left lower lung zone  Persistent mild reduction IgA normal range 70 Data review Alpha-1 antitrypsin titer negative 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

## 2024-05-30 NOTE — DISCUSSION/SUMMARY
[FreeTextEntry1] : Increased cough chest pain chest tightness with underlying obstructive airway disease in patient with known bronchiectasis and decline of overall pulmonary physiology at FVC Overall pulmonary physiology demonstrated improvement Medications updated For nasal congestion continue Flonase normal saline aided nasal Astelin performist acetycystiene low  dose medrol Address CBC biologic agent at f/u off prednsione Post doxycycline treatment COMPLETED Retx Medrol  evangelina COMPLETED Performist BID with acetycysteine BID   Cont Flovent 2 puffs BID office  f/u  return office post  return Fl  Does have a questionable positive penicillin allergy as well as Levaquin and sulfa issue with B2  [employer] Flovent 220 2 puffs BID and Rinse now  back at 110 mcg dose trial Spiriva 2.5 mcg 2 puffs QD OFF continue  with samples Influenza A hxistory Hx COVID infection Dec 2021 Mild MAEGAN  AHI 7 with excessive daytime sleepiness 4/29-30/2021 Asthma with less dosing of medication and only use of inhaled steroid without dilated noted increased NIOX  Abnormal CXR CT CHEST Bronchiectasis- IgA deficiency Hx as  noted hypothyroid Childhood polio Mild MR 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 noted just below normal IGA and low IgG 2 but demonstrates AB to strep  and tetanus R/O plasma cell dyscrasia  Recommendations 6 min walk- July-RS NIOX    singulair with pm dinner- HA change Accolate OFF remains on Co Q 10  prn Albuterol  NACL in NEB only BID no indication Antibx Issue immuno serology IGA and IgG  with subtypes A1AT titer HP panel and possible autoimmune serology- reviewed To consider VEST therapy if Bronchiectasis confirmed  with CT CHEST but requests to hold Pneumonia vaccines up to  date post 65 Oral  Appliance- then  restudy and make decision re CPAP- not active use f/u Dr Mckeon COVID booster recommended PFIZER Look into Acapella device using device with improvement- Aerobilki- active use  Mucinex  Allergy Sulfa PCN Levaquin Allergy f/u Patient compliant with CPAP therapy.  Continue present settings.  Patient with demonstrated clinical benefit with daytime and nocturnal symptomatology improvement. OFF  ENT  Dr Kodak Tadeo f/u AL with noted  recommendation Menigitis AB vaccine f/u OPTH  f/u GYN DEXA on ICS

## 2024-05-30 NOTE — HISTORY OF PRESENT ILLNESS
[Former] : former [TextBox_4] : Chest congestion cough tightness wheeze  still with sxs completed antibx ceftin and prednisone taper Post tx Nebulizer  with improvement remains on Flovent self txed Neb with acetylcysteine self txed medrol  exposure grand  children URI  chills  but  no  fever  onset  last  FRIDAY  and tx pred x  days  with ibnc dose  Flovent 220 mcg  pos  chest tightness  heaviness bronchiectasis selective IgA deficiency Not as  active with sxs added symbicort addition Flovent 110 mcg dose post Strep dx Urgent care 4/8/23 and Z LILLIANA sxs  better overall only feels some chest  pressure noted stopped Spiriva felt caused hoarse and not helping otherwise no decline since last  visit with minimal use of KARLOS  Popst Knee surgery left without resp  complications  feels pos  side  effects Wixela  felt inc BP  with systemic  steroids  ICS- Flovent HFA  110 mcg  2 puffs   post Influ A pos chest congestion resolved fever did not  get Tamiflu  HEME IgA deficiency, 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 To b noted, lab in 2004 showing Cr 0.9; Serum Kappa 398; lambda 70. lab in 7/2022 showed normal blood counts, Cr 0.97. Lab in 9/2022 showed IgG 1464; IgA 82; Kappa FLC 2.48; Lambda FLC 1.23; K/L ratio 2.02.  plan -Discussed with pt about lab results; increased monoclonal light chain level has been stable since 2004.  -continue to monitor MGUS symptoms and lab. Explained for pt that the risk of MGUS progresses to MM on average is about 1% each year according to data from [facility]. -RTC in one year  AI recommended HEME  72 year old female with DCIS s/p radiation and lumpectomy, Hashimoto thyroiditis, bronchiectasis, recurrent PNAs, reported allergies to multiple antibiotics presents for drug allergy evaluation.  MBL def.; needs mening vaccines A and B.  - delabeled from penicillin allergy, can use penicillins, cephalosporins as indicated REPORTED REACTION TO GENTAMICIN, VANCOMYCIN  There is a concern for RED MAN SYNDROME with IV Vancomycin. Vancomycin is a known trigger for mast cell degranulation via direct stimulation of mast cells and/or basophils, which may be due to bypass of the antibody-mediated pathway and activation of a mast cell-specific receptor called Mas-related G protein-coupled receptor X2 (MRGPRX2).  skin testing was inconclusive to GENTAMICIN, VANCOMYCIN  - avoid GENTAMICIN, VANCOMYCIN  = can repeat skin testing in the future - Based on history and positive skin testing, avoid levaquin - Cipro can be prescribed if indicated. Recommend 1st dose to be administered under physician's observation. can be done in my office.  SULFONAMIDE ANTIBIOTIC ALLERGY: Patient has a remote history of delayed benign rash when taking sulfa antibiotics. Unfortunately, there is no validated test available for sulfonamides.should continue avoiding sulfa antibiotics.  If/ when administration of sulfonamide is required, physician-supervised administration/graded challenge can be performed.  REPORTED REACTION TO CODEINE Codeine is a known trigger for mast cell degranulation via mechanism mentioned above.  Recommend avoidance.  ?ALLERGY TO IV CONTRAST  Pt cannot recall whether she reacted to IV contrast in the past. We asked that she obtain records to determine if additional testing is warranted. Of note however there is an ongoing national shortage of IV contrast and therefore testing is prohibited at this time. If the patient is unable to avoid the use of IV contrast then she should premedicate.  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 meningitidis IgG, Vaccine; Status:Active; Requested for:17Oct2022;  Total Hemolytic Complement; Status:Resulted - Requires Verification;   Done: 17Oct2022 03:44PM Discussion/Summary 72 year old female with PMH of DCIS s/p radiation, bronchiectasis, Hashimoto's, recurrent infections, multiple antibiotic, chronic urticaria,  allergies here for amoxicillin challenge.  AMOXICILLIN CHALLENGE: Today the patient, took 1050 mg of Amoxicillin in THREE  divided doses  in the office and she was observed for 1 hour. The challenge was started with 50 mg of Amoxicillin, then the dose was increased to 500 mg. Approximated, 45 minutes later she complained of mild pruritus on right upper extremity. 10 minutes later, she developed  erythematous rash with one hive on her neck. Vitals and Breath sounds are wnl. The rash self resolved in forty minutes, the dose was escalated to another 500mg, which was tolerated without any issues. The patient,  admitted to developing random hives without any triggers. We believer, her recent rash with amoxicillin most likely is associated with history of recurrent urticaria.  Patient developed one hive after Amoxicillin 500 mg, therefore was given an additional dose of Amoxicillin without new hives and initial hive resolved. Therefore, hives are likely due to underlying urticaria. -We will remove amoxicillin allergy from the patient's chart. Advised to use Amoxicillin or Penicillin in future with long acting antihistamine, such as Loratidine. Penicillin removed from the allergy list.  Low IgA and  72 year old female with DCIS s/p radiation and lumpectomy, Hashimoto thyroiditis, bronchiectasis, recurrent PNAs, reported allergies to multiple antibiotics presents for drug allergy evaluation.   skin testing to Vancomycin, Gentamicin, Levaquin, Cipro REPORTED REACTION TO GENTAMICIN, VANCOMYCIN  There is a concern for RED MAN SYNDROME with IV Vancomycin. Vancomycin is a known trigger for mast cell degranulation via direct stimulation of mast cells and/or basophils, which may be due to bypass of the antibody-mediated pathway and activation of a mast cell-specific receptor called Mas-related G protein-coupled receptor X2 (MRGPRX2). We will pursue additional evaluation to further investigate whether or not she has true IgE-mediated hypersensitivity.  REPORTED REACTION TO PENICILLIN Her remote history of skin "sinking in" after a penicillin shot is highly doubtful of true penicillin hypersensitivity. Would recommend amoxicillin challenge.  Patient will schedule an appointment for office graded challenge to Amoxicillin. If patient tolerates the 1st dose of Amoxicillin in the office without adverse effect, he/she will complete 3 days of treatment and we will observe for delayed reactions. If there is no delayed reaction observed, we will remove penicillin from drug allergy list.  We discussed that skin testing is predictive only for immediate, IgE-mediated allergic reactions. Patient's history is not consistent with immediate type of hypersensitivity. People with negative skin test, still have a chance of developing delayed hypersensitivity reaction. Currently, there are no scientifically validated tests for delayed reactions available. We discussed that many patients labeled with penicillin allergy are, in fact, not allergic to penicillin. Diagnosis of penicillin allergy leads to use of alternative, broader spectrum antibiotics and was demonstrated to cause higher rate of complications and morbidity.Patient was instructed to hold antihistamines, including topical intranasal and ocular antihistamine preparations, for at least 5 days before the testing. REPORTED REACTION TO IV LEVAQUIN, unclear reaction to PO CIPROFLOXACIN Can pursue additional testing to levaquin given concern for hypersensitivity. Ciprofloxacin (another fluoroquinolone) is a known trigger for mast cell degranulation via the mechanism mentioned above. Pt states she would like to pursue challenge for cipro since she is unsure if this antibiotic is problematic. REPORTED REACTION TO CODEINE Codeine is a known trigger for mast cell degranulation via mechanism mentioned above. Will hold off on further testing and recommend avoidance.  ?ALLERGY TO IV CONTRAST  Pt cannot recall whether she reacted to IV contrast in the past. We asked that she obtain records to determine if additional testing is warranted. Of note however there is an ongoing national shortage of IV contrast and therefore testing is prohibited at this time. If the patient is unable to avoid the use of IV contrast then she should premedicate.  RECURRENT PNEUMONIAS, SCREENING FOR IMMUNE DEFICIENCY; history of radiation therapy for DCIS: Given the history of RECURRENT INFECTIONS, it is reasonable to interrogate cellular, humoral, and complement arms of immunity. Laboratory testing was sent as stated below and results will be discussed when available. She states she received pneumonia vaccines although not sure which one, also received TDAP within the past 10 years. We will check her antibody responses to those vaccines. MIXED RHINITIS SPT showed positivity to feathers, grass, weeds which is inconsistent with her occasional symptoms throughout the year.  Topical intranasal treatments were offered for associated POST NASAL DRIP. She states she will consider these treatments when antibiotic testing is done so as not to interfere with results. States she will use nasal saline if needed for now which helps.    Just received RESMED CPAP device- better focus Did  not tolerate Oral appliance  chest  congestion more  acute cough  with pale yellow mucous Nocturnal symptoms cough leading to wheeze No purulent sputum reports clear to white- thick mucous No hemoptysis No fevers chills or sweats Has completed 2 courses of steroids 2 courses of antibiotics with persistence of the cough  MAEGAN issue MC coverage with oral appliance with f/u Sleep  dentist Dr Mckeon   post Fall months  ago and noted rib fx Right  Otherwise states doing very well with Wixela  No inc use KARLOS  70-year-old female  Complicated pulmonary history to be reviewed There is a diagnosis of asthma and she reports bronchiectasis with multiple histories of pneumonia at least 4-5 reported last very ill November December January with an additional illness in March She reports abnormal chest CTs and chest x-rays Last chest CT she reports was approximately 2018 where she was told of having scarlike tissue With these episodes she describes cough chest tightness wheezing sputum production Medications through the winter included Y UL P DARION in the nebulizer, Brovana right axilla sodium chloride nebulizer nasal Astelin nasal Flonase albuterol HFA course of prednisone and antibiotics including doxycycline x2 courses and Zithromax.  She states 1 to 1-1/2 weeks ago she developed some trouble breathing self started treatment with albuterol She did not receive any antibiotics or steroids with this symptomatology most recently noted She does not report at present any sputum production for the past several days and it is clear without hemoptysis purulence or foul smelling She also states that she was sick in March there was some question whether she could have COVID-19 but a COVID  19 PCR and antibody testing subsequently was negative Other significant history includes a bronchoscopy dating back to April 2019 which was negative including AFB fungus mold and  negative TBLBx. Last chest x-ray available for review dates back to April 16, 2019 which was status post a transbronchial biopsy. Thickening of the minor fissure which was noted and reported chronic Reticulonodular opacities right midlung Ill-defined focal opacity periphery left upper to mid lung with some central extension reported to the left hilum with out pneumothorax pleural effusion or significant volume loss.  Additional history Right breast DCIS treated radiation therapy 2017 Childhood polio Mild mitral regurgitation Hypothyroidism Nocturnal myoclonus  Vaccination profile flu vaccinations up-to-date 2018 2019 up-to-date with Prevnar and PCV 23

## 2024-06-04 ENCOUNTER — APPOINTMENT (OUTPATIENT)
Dept: PEDIATRIC ALLERGY IMMUNOLOGY | Facility: CLINIC | Age: 74
End: 2024-06-04
Payer: MEDICARE

## 2024-06-04 VITALS — OXYGEN SATURATION: 99 % | HEART RATE: 65 BPM

## 2024-06-04 VITALS — HEART RATE: 59 BPM | OXYGEN SATURATION: 97 %

## 2024-06-04 VITALS
WEIGHT: 132.5 LBS | DIASTOLIC BLOOD PRESSURE: 73 MMHG | BODY MASS INDEX: 22.08 KG/M2 | SYSTOLIC BLOOD PRESSURE: 116 MMHG | OXYGEN SATURATION: 96 % | HEIGHT: 65 IN | HEART RATE: 68 BPM

## 2024-06-04 VITALS — OXYGEN SATURATION: 94 % | DIASTOLIC BLOOD PRESSURE: 89 MMHG | HEART RATE: 60 BPM | SYSTOLIC BLOOD PRESSURE: 146 MMHG

## 2024-06-04 VITALS — DIASTOLIC BLOOD PRESSURE: 92 MMHG | SYSTOLIC BLOOD PRESSURE: 149 MMHG

## 2024-06-04 VITALS — OXYGEN SATURATION: 95 % | HEART RATE: 60 BPM

## 2024-06-04 VITALS — DIASTOLIC BLOOD PRESSURE: 88 MMHG | SYSTOLIC BLOOD PRESSURE: 135 MMHG

## 2024-06-04 VITALS — DIASTOLIC BLOOD PRESSURE: 82 MMHG | SYSTOLIC BLOOD PRESSURE: 134 MMHG

## 2024-06-04 DIAGNOSIS — Z13.29 ENCOUNTER FOR SCREENING FOR OTHER SUSPECTED ENDOCRINE DISORDER: ICD-10-CM

## 2024-06-04 DIAGNOSIS — Z88.9 ALLERGY STATUS TO UNSPECIFIED DRUGS, MEDICAMENTS AND BIOLOGICAL SUBSTANCES: ICD-10-CM

## 2024-06-04 DIAGNOSIS — Z13.0 ENCOUNTER FOR SCREENING FOR OTHER SUSPECTED ENDOCRINE DISORDER: ICD-10-CM

## 2024-06-04 DIAGNOSIS — L50.8 OTHER URTICARIA: ICD-10-CM

## 2024-06-04 DIAGNOSIS — Z13.228 ENCOUNTER FOR SCREENING FOR OTHER SUSPECTED ENDOCRINE DISORDER: ICD-10-CM

## 2024-06-04 DIAGNOSIS — J18.9 PNEUMONIA, UNSPECIFIED ORGANISM: ICD-10-CM

## 2024-06-04 DIAGNOSIS — Z01.89 ENCOUNTER FOR OTHER SPECIFIED SPECIAL EXAMINATIONS: ICD-10-CM

## 2024-06-04 PROCEDURE — 95076 INGEST CHALLENGE INI 120 MIN: CPT

## 2024-06-04 PROCEDURE — 36415 COLL VENOUS BLD VENIPUNCTURE: CPT

## 2024-06-04 NOTE — CONSULT LETTER
[Dear  ___] : Dear  [unfilled], [Consult Letter:] : I had the pleasure of evaluating your patient, [unfilled]. [Please see my note below.] : Please see my note below. [Consult Closing:] : Thank you very much for allowing me to participate in the care of this patient.  If you have any questions, please do not hesitate to contact me. [Sincerely,] : Sincerely, [DrSunny  ___] : Dr. STONE [FreeTextEntry3] : MD ELVIA Morris, [facility] All[facility], Asthma and Clinical Immunology Associate Professor of Medicine and Pediatrics at   [facility], Adult Allergy and Immunology   [facility]   Division of Allergy, Asthma and Immunology   [address] [place] [address], [place] [id number]   Phone [phone]  Fax [phone]

## 2024-06-04 NOTE — PHYSICAL EXAM
[Alert] : alert [Well Nourished] : well nourished [Healthy Appearance] : healthy appearance [No Acute Distress] : no acute distress [Sclera Not Icteric] : sclera not icteric [No Thrush] : no thrush [Normal Rate and Effort] : normal respiratory rhythm and effort [No Crackles] : no crackles [Bilateral Audible Breath Sounds] : bilateral audible breath sounds [Normal Rate] : heart rate was normal  [Regular Rhythm] : with a regular rhythm [Patches] : no patches [Urticaria] : no urticaria [Normal Mood] : mood was normal [Normal Affect] : affect was normal [Alert, Awake, Oriented as Age-Appropriate] : alert, awake, oriented as age appropriate

## 2024-06-04 NOTE — PROCEDURE
[Patient ingested ___ amount of allergen] : Patient ingested [unfilled] amount of allergen [Pass] : Challenge: Pass [FreeTextEntry1] : Completed 1/10 of Bactrim tablet plus 1 tablet. Seen by Dr Barba and discharged home stable

## 2024-06-04 NOTE — PLAN
[FreeTextEntry1] : 73 year old female with PMH of DCIS s/p radiation, bronchiectasis, Hashimoto's, recurrent infections, MBL deficiency, MGUS, history of multiple drug reactions. .= delabeled from PCN allergy; negative amoxicillin challenge 10/17/22 = negative bactrim challenge 6/4/24  Immune work up: = s/p Prevnar 20 10/24/22 and HIB 10/3/22 =  2022- SIGNIFICANT for borderline low IgA, IgG kappa band, nonprotective specific antibody titers to diphtheria, HIB and Strep. pneumoniae- +9/22, MBL-83, repeat MBL<70; CH50-40, repeat normal --- IgA- 61 in 2021, normal - 86 - 9/2023  --- pneum titers 3/2024-  0/22 (>1.3) after Prevnar 20 10/24/22 - lost titers --- HIB titers- 0.15 3/2024 after HIB immunization 10/3/22 Concern for specific antibody deficiency: - [place] test ID: [id number]. B-Cell Phenotyping Profile for Immunodeficiency and Immune Competence Assessment, Blood sent. Laboratories were ordered and blood work drawn by MICHAEL Durant.

## 2024-06-04 NOTE — HISTORY OF PRESENT ILLNESS
[Consent obtained and signed form scanned in to chart] : Consent obtained and signed form scanned in to chart [] : The following medications are to be available during the challenge procedure: [Diphenhydramine] : Diphenhydramine, 1-2mg/kg IM (max dose 50mg), (50mg/1 cc) [___ mg] : Dose: [unfilled] mg [___ cc] : Volume: [unfilled] cc [Solucortef] : Solucortef, 4-8 mg/kg IM (max dose 200 mg), (100mg/2 cc) [Epinephrine 1:1000 IM] : Epinephrine 1:1000 IM, 0.01cc/kg (max dose 0.5 cc) [Albuterol MDI] : Albuterol MDI, 2 - 4 puffs [Albuterol nebulized] : Albuterol nebulized, 0.083% [_______] : Time: [unfilled] [Clear] : Skin Findings: Clear [No] : Reaction: No [___] : HR: [unfilled]  [0 Gastro-Objective complaints: 0 - Absent] : Gastro-Objective Complaints (IVB): 0 - Absent [___] : Amount: [unfilled] [___% 1) Skin -  A) Erythematous rash - % area involved] : Erythematous Rash (IA): [unfilled] % area involved [0 Pruritus: 0  - absent] : Pruritus (IB): 0 - absent [0 Urticaria/Angioedema: 0 - Absent] : Urticaria/Angioedema (IC): 0  - Absent [0 Rash: 0 - Absent] : Rash (ID): 0 - Absent [0 Sneezing/Itchin - Absent] : Sneezing/Itching (IIA): 0 - Absent [0 Nasal congestion: 0 - Absent] : Nasal congestion (IIB): 0 - Absent [0 Rhinorrhea: 0 - Absent] : Rhinorrhea (IIC): 0 - Absent [0 Laryngeal: 0 - Absent] : Laryngeal (IID): 0 - Absent [0 Wheezin - Absent] : Wheezing (IIIA): 0 - Absent [0 Gastro-Subjective complaints: 0 - Absent] : Gastro-Subjective Complaints (SCAR): 0 - Absent [Antihistamine use in past 5 days] : No antihistamine use in past 5 days [Recent Illness] : no recent illness [Fever] : no fever [de-identified] : patient here for oral Bactrim challenge. Alert and responsive. Briught 4 tablets of Sulfamethoxazole/Trimthoprim 400/80 mg from [facility] RX [id number] [phone] [FreeTextEntry1] : Bactrim [FreeTextEntry2] : 400/80 [FreeTextEntry3] : lungs clear consent signed [FreeTextEntry4] : stable [FreeTextEntry9] : lungs clear consent signed [de-identified] : stable [de-identified] : no reaction [de-identified] : no reaction [de-identified] : stable [de-identified] : patient reports feeling cold and funny. Lungs clear no visible hives or rashes to face or bdy. Seen by aileen Barba. [de-identified] : stil cold feeling . No visile signs of reaction or distress being closely monitored. [de-identified] : stable . No further complaints of "odd feeling [de-identified] : Blood work done and Seen again by Dr Barba. Discharged home stable

## 2024-06-12 LAB
MISCELLANEOUS TEST: NORMAL
PROC NAME: NORMAL

## 2024-07-15 ENCOUNTER — APPOINTMENT (OUTPATIENT)
Dept: PEDIATRIC ALLERGY IMMUNOLOGY | Facility: CLINIC | Age: 74
End: 2024-07-15
Payer: MEDICARE

## 2024-07-15 VITALS
OXYGEN SATURATION: 95 % | SYSTOLIC BLOOD PRESSURE: 152 MMHG | DIASTOLIC BLOOD PRESSURE: 85 MMHG | BODY MASS INDEX: 22.22 KG/M2 | HEART RATE: 75 BPM | WEIGHT: 133.38 LBS | HEIGHT: 65 IN

## 2024-07-15 DIAGNOSIS — Z88.9 ALLERGY STATUS TO UNSPECIFIED DRUGS, MEDICAMENTS AND BIOLOGICAL SUBSTANCES: ICD-10-CM

## 2024-07-15 DIAGNOSIS — D80.2 SELECTIVE DEFICIENCY OF IMMUNOGLOBULIN A [IGA]: ICD-10-CM

## 2024-07-15 DIAGNOSIS — Z88.1 ALLERGY STATUS TO OTHER ANTIBIOTIC AGENTS: ICD-10-CM

## 2024-07-15 DIAGNOSIS — D89.89 OTHER SPECIFIED DISORDERS INVOLVING THE IMMUNE MECHANISM, NOT ELSEWHERE CLASSIFIED: ICD-10-CM

## 2024-07-15 DIAGNOSIS — B99.9 UNSPECIFIED INFECTIOUS DISEASE: ICD-10-CM

## 2024-07-15 PROCEDURE — 99214 OFFICE O/P EST MOD 30 MIN: CPT

## 2024-07-17 ENCOUNTER — APPOINTMENT (OUTPATIENT)
Dept: GASTROENTEROLOGY | Facility: CLINIC | Age: 74
End: 2024-07-17
Payer: MEDICARE

## 2024-07-17 ENCOUNTER — LABORATORY RESULT (OUTPATIENT)
Age: 74
End: 2024-07-17

## 2024-07-17 VITALS
HEIGHT: 65 IN | OXYGEN SATURATION: 98 % | BODY MASS INDEX: 21.66 KG/M2 | DIASTOLIC BLOOD PRESSURE: 79 MMHG | WEIGHT: 130 LBS | HEART RATE: 78 BPM | SYSTOLIC BLOOD PRESSURE: 124 MMHG

## 2024-07-17 DIAGNOSIS — K52.9 NONINFECTIVE GASTROENTERITIS AND COLITIS, UNSPECIFIED: ICD-10-CM

## 2024-07-17 DIAGNOSIS — R10.9 UNSPECIFIED ABDOMINAL PAIN: ICD-10-CM

## 2024-07-17 DIAGNOSIS — Z12.11 ENCOUNTER FOR SCREENING FOR MALIGNANT NEOPLASM OF COLON: ICD-10-CM

## 2024-07-17 DIAGNOSIS — R19.7 DIARRHEA, UNSPECIFIED: ICD-10-CM

## 2024-07-17 PROCEDURE — 99214 OFFICE O/P EST MOD 30 MIN: CPT

## 2024-07-17 PROCEDURE — G2211 COMPLEX E/M VISIT ADD ON: CPT

## 2024-07-17 PROCEDURE — 36415 COLL VENOUS BLD VENIPUNCTURE: CPT

## 2024-07-18 ENCOUNTER — APPOINTMENT (OUTPATIENT)
Dept: PULMONOLOGY | Facility: CLINIC | Age: 74
End: 2024-07-18
Payer: MEDICARE

## 2024-07-18 VITALS — DIASTOLIC BLOOD PRESSURE: 71 MMHG | OXYGEN SATURATION: 97 % | HEART RATE: 80 BPM | SYSTOLIC BLOOD PRESSURE: 125 MMHG

## 2024-07-18 DIAGNOSIS — J44.9 CHRONIC OBSTRUCTIVE PULMONARY DISEASE, UNSPECIFIED: ICD-10-CM

## 2024-07-18 DIAGNOSIS — J44.89 OTHER SPECIFIED CHRONIC OBSTRUCTIVE PULMONARY DISEASE: ICD-10-CM

## 2024-07-18 DIAGNOSIS — T17.500A UNSPECIFIED FOREIGN BODY IN BRONCHUS CAUSING ASPHYXIATION, INITIAL ENCOUNTER: ICD-10-CM

## 2024-07-18 DIAGNOSIS — J45.909 UNSPECIFIED ASTHMA, UNCOMPLICATED: ICD-10-CM

## 2024-07-18 DIAGNOSIS — L23.7 ALLERGIC CONTACT DERMATITIS DUE TO PLANTS, EXCEPT FOOD: ICD-10-CM

## 2024-07-18 PROCEDURE — 95012 NITRIC OXIDE EXP GAS DETER: CPT

## 2024-07-18 PROCEDURE — 99214 OFFICE O/P EST MOD 30 MIN: CPT | Mod: 25

## 2024-07-18 PROCEDURE — 94060 EVALUATION OF WHEEZING: CPT

## 2024-07-18 RX ORDER — TRIAMCINOLONE ACETONIDE 1 MG/G
0.1 OINTMENT TOPICAL
Qty: 1 | Refills: 0 | Status: ACTIVE | COMMUNITY
Start: 2024-07-18 | End: 1900-01-01

## 2024-07-21 LAB
ALBUMIN SERPL ELPH-MCNC: 4.4 G/DL
ALP BLD-CCNC: 62 U/L
ALT SERPL-CCNC: 13 U/L
AST SERPL-CCNC: 20 U/L
BASOPHILS # BLD AUTO: 0.05 K/UL
BASOPHILS NFR BLD AUTO: 0.7 %
BILIRUB DIRECT SERPL-MCNC: 0.2 MG/DL
BILIRUB INDIRECT SERPL-MCNC: 0.8 MG/DL
BILIRUB SERPL-MCNC: 1.1 MG/DL
CREAT SERPL-MCNC: 0.98 MG/DL
EGFR: 61 ML/MIN/1.73M2
EOSINOPHIL # BLD AUTO: 0.33 K/UL
EOSINOPHIL NFR BLD AUTO: 4.6 %
HCT VFR BLD CALC: 39.4 %
HGB BLD-MCNC: 13 G/DL
IMM GRANULOCYTES NFR BLD AUTO: 0.1 %
LYMPHOCYTES # BLD AUTO: 2.74 K/UL
LYMPHOCYTES NFR BLD AUTO: 38.4 %
MAN DIFF?: NORMAL
MCHC RBC-ENTMCNC: 31.6 PG
MCHC RBC-ENTMCNC: 33 GM/DL
MCV RBC AUTO: 95.9 FL
MONOCYTES # BLD AUTO: 0.53 K/UL
MONOCYTES NFR BLD AUTO: 7.4 %
NEUTROPHILS # BLD AUTO: 3.48 K/UL
NEUTROPHILS NFR BLD AUTO: 48.8 %
PLATELET # BLD AUTO: 294 K/UL
PROT SERPL-MCNC: 7 G/DL
RBC # BLD: 4.11 M/UL
RBC # FLD: 14.1 %
STRONGYLOIDES AB SER IA-ACNC: NEGATIVE
T4 FREE SERPL-MCNC: 1 NG/DL
TOTAL IGE SMQN RAST: 39 KU/L
TSH SERPL-ACNC: 5.36 UIU/ML
VIT B12 SERPL-MCNC: 1008 PG/ML
WBC # FLD AUTO: 7.14 K/UL

## 2024-07-23 ENCOUNTER — APPOINTMENT (OUTPATIENT)
Dept: RADIOLOGY | Facility: IMAGING CENTER | Age: 74
End: 2024-07-23

## 2024-07-23 PROCEDURE — 77080 DXA BONE DENSITY AXIAL: CPT | Mod: 26

## 2024-07-25 ENCOUNTER — APPOINTMENT (OUTPATIENT)
Dept: CARDIOLOGY | Facility: CLINIC | Age: 74
End: 2024-07-25
Payer: MEDICARE

## 2024-07-25 ENCOUNTER — NON-APPOINTMENT (OUTPATIENT)
Age: 74
End: 2024-07-25

## 2024-07-25 VITALS
WEIGHT: 130 LBS | HEART RATE: 64 BPM | DIASTOLIC BLOOD PRESSURE: 70 MMHG | HEIGHT: 65 IN | OXYGEN SATURATION: 99 % | BODY MASS INDEX: 21.66 KG/M2 | SYSTOLIC BLOOD PRESSURE: 120 MMHG

## 2024-07-25 DIAGNOSIS — J47.9 BRONCHIECTASIS, UNCOMPLICATED: ICD-10-CM

## 2024-07-25 DIAGNOSIS — I10 ESSENTIAL (PRIMARY) HYPERTENSION: ICD-10-CM

## 2024-07-25 DIAGNOSIS — R00.2 PALPITATIONS: ICD-10-CM

## 2024-07-25 PROCEDURE — 99214 OFFICE O/P EST MOD 30 MIN: CPT

## 2024-07-25 PROCEDURE — G2211 COMPLEX E/M VISIT ADD ON: CPT

## 2024-07-25 PROCEDURE — 93000 ELECTROCARDIOGRAM COMPLETE: CPT

## 2024-07-25 NOTE — PHYSICAL EXAM
[TextEntry] : General/Constitutional: WD/ WN in NAD H: NC/AT Eyes:  PERRL, sclerae and conjunctivae normal without jaundice or xanthelasma; ENMT:normal teeth, gums and palate with no petechiae, pallor or cyanosis Neck: w/o JVD, thyromegaly or adenopathy; normal venous contour Respiratory: clear to auscultation, normal respiratory effort with no retractions or use of accessory respiratory muscles Heart: [id number], regular rate, normal S1, S2 without murmurs, rubs, gallops, heaves or thrills Vascular exam: normal carotid upstrokes without carotid or abdominal bruits. 2+/2+ pulses to posterior tibialis and dorsalis pedis Abdomen: soft, nontender, bowels sounds normal without hepatomegaly or splenomegaly, masses or bruits Musculoskeletal:without significant kyphosis or scoliosis Extremities: w/o CCE, good capillary filling Skin: no stasis changes; no ulcers  Neuro: AA and O x 3; no focal neurologic deficits Psych: normal mood and affect

## 2024-07-25 NOTE — REVIEW OF SYSTEMS
[Chest Discomfort] : chest discomfort [Palpitations] : palpitations [Negative] : Heme/Lymph [TextEntry] : Except as noted above... Constitutional: The patient denied headache, fatigue, fever, sweats, loss of appetite or chills Eyes: The patient denied double vision, eye pain, eye discharge, red eyes or itchy eyes ENT: The patient denied ear pain, ear discharge, nasal congestion, nasal discharge, sore throat, enlarged tonsils, hoarseness, neck pain or neck swelling Cardiovascular: The patient denied chest pain, chest discomfort, dizziness,  fainting  or leg cramps Respiratory: The patient denied shortness of breath, cough, coughing up blood, wheezing, chest congestion or mucous production GI: The patient denied weight gain, weight loss, abdominal pain, nausea, vomiting, diarrhea, constipation, black stools or bloody stools : The patient denied pain on urination, burning with urination, frequent urination or blood in the urine Skin: The patient denied rashes, redness or swelling Neurologic: The patient denied headache, stiff neck, weakness, numbness, difficulty speaking, unsteadiness or numbness/tingling in feet Psychiatric: The patient denied hallucinations, agitation or disorientation Endocrine: The patient denied excessive thirst, excessive urination, cold intolerance or heat intolerance Hematologic: The patient denied easy bruisability or pallor Allergic/Immunologic: The patient denied runny nose, recurrent infections, hives or pruritis Musculoskeletal: The patient denied arthritic pains, muscle weakness or muscle aches Extremities: The patient denied clubbing, cyanosis or lower extremity swelling

## 2024-07-25 NOTE — REASON FOR VISIT
[FreeTextEntry1] : The patient is here today for follow-up of hypertension, bronchiectasis and palpitations.  The patient has been having difficulties with nocturnal myoclonus.  She started taking magnesium pills and since beginning that, the patient's palpitations have resolved almost entirely.  She does describe occasional skipped beats which last for seconds and resolve spontaneously or after she coughs.  She denies any sustained episodes and even the brief episode she has now are fairly rare.  She has been having some difficulties with diarrhea probably related to the magnesium and flaxseed oil which she was taking but she recently stopped taking flaxseed oil.  She is being followed by GI.  Her bronchiectasis seems to be well-controlled.  She did recently have bone density studies which demonstrated osteopenia.  The patient asked whether the inhaled steroids could have contributed.  I told her that although it was possible, the steroids are clearly important for her breathing and are necessary part of her care.  She is following up with Dr. Cantor.

## 2024-07-25 NOTE — DISCUSSION/SUMMARY
[FreeTextEntry1] : Hypertension, palpitations-the patient appears to be doing very well.  Her hypertension is well-controlled and her palpitations have essentially resolved.  I recommended that she continue her current regimen for now. Return visit 6 months or sooner if any further Cardiologic problems develop.   Total time of the encounter: 30 minutes which included but was not limited to the following: Face-to-face and non face-to-face time personally spent by the physician preparing to see the patient, obtaining and/or resuming separately obtained history, performing a medically appropriate examination and/or evaluation, counseling and educating the patient/family/caregiver, ordering medications, tests or procedures, referring and communicating with other healthcare professionals, documenting clinical information in the electronic health record, independently interpreting results and communicated results to the patient/family/caregiver and care coordination. [EKG obtained to assist in diagnosis and management of assessed problem(s)] : EKG obtained to assist in diagnosis and management of assessed problem(s)

## 2024-07-26 RX ORDER — HYDROCORTISONE 25 MG/G
2.5 CREAM TOPICAL 3 TIMES DAILY
Qty: 1 | Refills: 3 | Status: ACTIVE | COMMUNITY
Start: 2024-07-18 | End: 1900-01-01

## 2024-08-16 ENCOUNTER — OUTPATIENT (OUTPATIENT)
Dept: OUTPATIENT SERVICES | Facility: HOSPITAL | Age: 74
LOS: 1 days | Discharge: HOME | End: 2024-08-16

## 2024-08-16 DIAGNOSIS — Z90.49 ACQUIRED ABSENCE OF OTHER SPECIFIED PARTS OF DIGESTIVE TRACT: Chronic | ICD-10-CM

## 2024-08-16 DIAGNOSIS — D47.2 MONOCLONAL GAMMOPATHY: ICD-10-CM

## 2024-08-23 ENCOUNTER — APPOINTMENT (OUTPATIENT)
Dept: PULMONOLOGY | Facility: CLINIC | Age: 74
End: 2024-08-23
Payer: MEDICARE

## 2024-08-23 VITALS — DIASTOLIC BLOOD PRESSURE: 67 MMHG | SYSTOLIC BLOOD PRESSURE: 121 MMHG | HEART RATE: 77 BPM | OXYGEN SATURATION: 97 %

## 2024-08-23 DIAGNOSIS — J45.909 UNSPECIFIED ASTHMA, UNCOMPLICATED: ICD-10-CM

## 2024-08-23 DIAGNOSIS — J44.9 CHRONIC OBSTRUCTIVE PULMONARY DISEASE, UNSPECIFIED: ICD-10-CM

## 2024-08-23 DIAGNOSIS — J47.9 BRONCHIECTASIS, UNCOMPLICATED: ICD-10-CM

## 2024-08-23 PROCEDURE — 94060 EVALUATION OF WHEEZING: CPT

## 2024-08-23 PROCEDURE — 95012 NITRIC OXIDE EXP GAS DETER: CPT

## 2024-08-23 PROCEDURE — 99214 OFFICE O/P EST MOD 30 MIN: CPT | Mod: 25

## 2024-08-23 RX ORDER — DOXYCYCLINE HYCLATE 100 MG/1
100 TABLET ORAL
Qty: 14 | Refills: 0 | Status: ACTIVE | COMMUNITY
Start: 2024-08-23 | End: 1900-01-01

## 2024-08-23 RX ORDER — METHYLPREDNISOLONE 4 MG/1
4 TABLET ORAL
Qty: 21 | Refills: 0 | Status: ACTIVE | COMMUNITY
Start: 2024-08-23 | End: 1900-01-01

## 2024-08-23 NOTE — DISCUSSION/SUMMARY
[FreeTextEntry1] : stable FERRARA  without decline Overall pulmonary physiology demonstrated improvement Medications updated For nasal congestion continue Flonase normal saline aided nasal Astelin performist acetycystiene low  dose medrol Address CBC biologic agent at f/u off prednsione Post doxycycline treatment COMPLETED Retx Medrol  evangelina COMPLETED Performist BID with acetycysteine BID   Cont Flovent 2 puffs BID office  f/u  return office post  return Fl  Does have a questionable positive penicillin allergy as well as Levaquin and sulfa issue with B2  [employer] Flovent 220 2 puffs BID and Rinse now  back at 110 mcg dose trial Spiriva 2.5 mcg 2 puffs QD OFF continue  with samples Influenza A hxistory Hx COVID infection Dec 2021 Mild MAEGAN  AHI 7 with excessive daytime sleepiness 4/29-30/2021 Asthma with less dosing of medication and only use of inhaled steroid without dilated noted increased NIOX  Abnormal CXR CT CHEST Bronchiectasis- IgA deficiency Hx as  noted hypothyroid Childhood polio Mild MR 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 noted just below normal IGA and low IgG 2 but demonstrates AB to strep  and tetanus R/O plasma cell dyscrasia  Recommendations 6 min walk- July-RS NIOX    singulair with pm dinner- HA change Accolate OFF remains on Co Q 10  prn Albuterol  NACL in NEB only BID no indication Antibx Issue immuno serology IGA and IgG  with subtypes A1AT titer HP panel and possible autoimmune serology- reviewed To consider VEST therapy if Bronchiectasis confirmed  with CT CHEST but requests to hold Pneumonia vaccines up to  date post 65 Oral  Appliance- then  restudy and make decision re CPAP- not active use f/u Dr Mckeon COVID booster recommended PFIZER Look into Acapella device using device with improvement- Aerobilki- active use  Mucinex  Allergy Sulfa PCN Levaquin Allergy f/u Patient compliant with CPAP therapy.  Continue present settings.  Patient with demonstrated clinical benefit with daytime and nocturnal symptomatology improvement. OFF  ENT  Dr Kodak Tadeo f/u AL with noted  recommendation Menigitis AB vaccine f/u OPTH  f/u GYN DEXA on ICS

## 2024-08-23 NOTE — HISTORY OF PRESENT ILLNESS
[Former] : former [TextBox_4] : 08/23/24 here for f/u, have not been sick in 5 months remains on controller therapy  NOT ACTIVE Chest congestion cough tightness wheeze  still with sxs completed antibx ceftin and prednisone taper Post tx Nebulizer  with improvement remains on Flovent self txed Neb with acetylcysteine self txed medrol  exposure grand  children URI  chills  but  no  fever  onset  last  FRIDAY  and tx pred x  days  with ibnc dose  Flovent 220 mcg  pos  chest tightness  heaviness bronchiectasis selective IgA deficiency Not as  active with sxs added symbicort addition Flovent 110 mcg dose post Strep dx Urgent care 4/8/23 and Z LILLIANA sxs  better overall only feels some chest  pressure noted stopped Spiriva felt caused hoarse and not helping otherwise no decline since last  visit with minimal use of KARLOS  Popst Knee surgery left without resp  complications  feels pos  side  effects Wixela  felt inc BP  with systemic  steroids  ICS- Flovent HFA  110 mcg  2 puffs   post Influ A pos chest congestion resolved fever did not  get Tamiflu  HEME IgA deficiency, 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 To b noted, lab in 2004 showing Cr 0.9; Serum Kappa 398; lambda 70. lab in 7/2022 showed normal blood counts, Cr 0.97. Lab in 9/2022 showed IgG 1464; IgA 82; Kappa FLC 2.48; Lambda FLC 1.23; K/L ratio 2.02.  plan -Discussed with pt about lab results; increased monoclonal light chain level has been stable since 2004.  -continue to monitor MGUS symptoms and lab. Explained for pt that the risk of MGUS progresses to MM on average is about 1% each year according to data from [facility]. -RTC in one year  AI recommended HEME  72 year old female with DCIS s/p radiation and lumpectomy, Hashimoto thyroiditis, bronchiectasis, recurrent PNAs, reported allergies to multiple antibiotics presents for drug allergy evaluation.  MBL def.; needs mening vaccines A and B.  - delabeled from penicillin allergy, can use penicillins, cephalosporins as indicated REPORTED REACTION TO GENTAMICIN, VANCOMYCIN  There is a concern for RED MAN SYNDROME with IV Vancomycin. Vancomycin is a known trigger for mast cell degranulation via direct stimulation of mast cells and/or basophils, which may be due to bypass of the antibody-mediated pathway and activation of a mast cell-specific receptor called Mas-related G protein-coupled receptor X2 (MRGPRX2).  skin testing was inconclusive to GENTAMICIN, VANCOMYCIN  - avoid GENTAMICIN, VANCOMYCIN  = can repeat skin testing in the future - Based on history and positive skin testing, avoid levaquin - Cipro can be prescribed if indicated. Recommend 1st dose to be administered under physician's observation. can be done in my office.  SULFONAMIDE ANTIBIOTIC ALLERGY: Patient has a remote history of delayed benign rash when taking sulfa antibiotics. Unfortunately, there is no validated test available for sulfonamides.should continue avoiding sulfa antibiotics.  If/ when administration of sulfonamide is required, physician-supervised administration/graded challenge can be performed.  REPORTED REACTION TO CODEINE Codeine is a known trigger for mast cell degranulation via mechanism mentioned above.  Recommend avoidance.  ?ALLERGY TO IV CONTRAST  Pt cannot recall whether she reacted to IV contrast in the past. We asked that she obtain records to determine if additional testing is warranted. Of note however there is an ongoing national shortage of IV contrast and therefore testing is prohibited at this time. If the patient is unable to avoid the use of IV contrast then she should premedicate.  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 meningitidis IgG, Vaccine; Status:Active; Requested for:[id number];  Total Hemolytic Complement; Status:Resulted - Requires Verification;   Done: [id number] 03:44PM Discussion/Summary 72 year old female with PMH of DCIS s/p radiation, bronchiectasis, Hashimoto's, recurrent infections, multiple antibiotic, chronic urticaria,  allergies here for amoxicillin challenge.  AMOXICILLIN CHALLENGE: Today the patient, took 1050 mg of Amoxicillin in THREE  divided doses  in the office and she was observed for 1 hour. The challenge was started with 50 mg of Amoxicillin, then the dose was increased to 500 mg. Approximated, 45 minutes later she complained of mild pruritus on right upper extremity. 10 minutes later, she developed  erythematous rash with one hive on her neck. Vitals and Breath sounds are wnl. The rash self resolved in forty minutes, the dose was escalated to another 500mg, which was tolerated without any issues. The patient,  admitted to developing random hives without any triggers. We believer, her recent rash with amoxicillin most likely is associated with history of recurrent urticaria.  Patient developed one hive after Amoxicillin 500 mg, therefore was given an additional dose of Amoxicillin without new hives and initial hive resolved. Therefore, hives are likely due to underlying urticaria. -We will remove amoxicillin allergy from the patient's chart. Advised to use Amoxicillin or Penicillin in future with long acting antihistamine, such as Loratidine. Penicillin removed from the allergy list.  Low IgA and  72 year old female with DCIS s/p radiation and lumpectomy, Hashimoto thyroiditis, bronchiectasis, recurrent PNAs, reported allergies to multiple antibiotics presents for drug allergy evaluation.   skin testing to Vancomycin, Gentamicin, Levaquin, Cipro REPORTED REACTION TO GENTAMICIN, VANCOMYCIN  There is a concern for RED MAN SYNDROME with IV Vancomycin. Vancomycin is a known trigger for mast cell degranulation via direct stimulation of mast cells and/or basophils, which may be due to bypass of the antibody-mediated pathway and activation of a mast cell-specific receptor called Mas-related G protein-coupled receptor X2 (MRGPRX2). We will pursue additional evaluation to further investigate whether or not she has true IgE-mediated hypersensitivity.  REPORTED REACTION TO PENICILLIN Her remote history of skin "sinking in" after a penicillin shot is highly doubtful of true penicillin hypersensitivity. Would recommend amoxicillin challenge.  Patient will schedule an appointment for office graded challenge to Amoxicillin. If patient tolerates the 1st dose of Amoxicillin in the office without adverse effect, he/she will complete 3 days of treatment and we will observe for delayed reactions. If there is no delayed reaction observed, we will remove penicillin from drug allergy list.  We discussed that skin testing is predictive only for immediate, IgE-mediated allergic reactions. Patient's history is not consistent with immediate type of hypersensitivity. People with negative skin test, still have a chance of developing delayed hypersensitivity reaction. Currently, there are no scientifically validated tests for delayed reactions available. We discussed that many patients labeled with penicillin allergy are, in fact, not allergic to penicillin. Diagnosis of penicillin allergy leads to use of alternative, broader spectrum antibiotics and was demonstrated to cause higher rate of complications and morbidity.Patient was instructed to hold antihistamines, including topical intranasal and ocular antihistamine preparations, for at least 5 days before the testing. REPORTED REACTION TO IV LEVAQUIN, unclear reaction to PO CIPROFLOXACIN Can pursue additional testing to levaquin given concern for hypersensitivity. Ciprofloxacin (another fluoroquinolone) is a known trigger for mast cell degranulation via the mechanism mentioned above. Pt states she would like to pursue challenge for cipro since she is unsure if this antibiotic is problematic. REPORTED REACTION TO CODEINE Codeine is a known trigger for mast cell degranulation via mechanism mentioned above. Will hold off on further testing and recommend avoidance.  ?ALLERGY TO IV CONTRAST  Pt cannot recall whether she reacted to IV contrast in the past. We asked that she obtain records to determine if additional testing is warranted. Of note however there is an ongoing national shortage of IV contrast and therefore testing is prohibited at this time. If the patient is unable to avoid the use of IV contrast then she should premedicate.  RECURRENT PNEUMONIAS, SCREENING FOR IMMUNE DEFICIENCY; history of radiation therapy for DCIS: Given the history of RECURRENT INFECTIONS, it is reasonable to interrogate cellular, humoral, and complement arms of immunity. Laboratory testing was sent as stated below and results will be discussed when available. She states she received pneumonia vaccines although not sure which one, also received TDAP within the past 10 years. We will check her antibody responses to those vaccines. MIXED RHINITIS SPT showed positivity to feathers, grass, weeds which is inconsistent with her occasional symptoms throughout the year.  Topical intranasal treatments were offered for associated POST NASAL DRIP. She states she will consider these treatments when antibiotic testing is done so as not to interfere with results. States she will use nasal saline if needed for now which helps.    Just received RESMED CPAP device- better focus Did  not tolerate Oral appliance  chest  congestion more  acute cough  with pale yellow mucous Nocturnal symptoms cough leading to wheeze No purulent sputum reports clear to white- thick mucous No hemoptysis No fevers chills or sweats Has completed 2 courses of steroids 2 courses of antibiotics with persistence of the cough  MAEGAN issue MC coverage with oral appliance with f/u Sleep  dentist Dr Mckeon   post Fall months  ago and noted rib fx Right  Otherwise states doing very well with Wixela  No inc use KARLOS  70-year-old female  Complicated pulmonary history to be reviewed There is a diagnosis of asthma and she reports bronchiectasis with multiple histories of pneumonia at least 4-5 reported last very ill November December January with an additional illness in March She reports abnormal chest CTs and chest x-rays Last chest CT she reports was approximately 2018 where she was told of having scarlike tissue With these episodes she describes cough chest tightness wheezing sputum production Medications through the winter included Y UL P DARION in the nebulizer, Brovana right axilla sodium chloride nebulizer nasal Astelin nasal Flonase albuterol HFA course of prednisone and antibiotics including doxycycline x2 courses and Zithromax.  She states 1 to 1-1/2 weeks ago she developed some trouble breathing self started treatment with albuterol She did not receive any antibiotics or steroids with this symptomatology most recently noted She does not report at present any sputum production for the past several days and it is clear without hemoptysis purulence or foul smelling She also states that she was sick in March there was some question whether she could have COVID-19 but a COVID  19 PCR and antibody testing subsequently was negative Other significant history includes a bronchoscopy dating back to April 2019 which was negative including AFB fungus mold and  negative TBLBx. Last chest x-ray available for review dates back to April 16, 2019 which was status post a transbronchial biopsy. Thickening of the minor fissure which was noted and reported chronic Reticulonodular opacities right midlung Ill-defined focal opacity periphery left upper to mid lung with some central extension reported to the left hilum with out pneumothorax pleural effusion or significant volume loss.  Additional history Right breast DCIS treated radiation therapy 2017 Childhood polio Mild mitral regurgitation Hypothyroidism Nocturnal myoclonus  Vaccination profile flu vaccinations up-to-date 2018 2019 up-to-date with Prevnar and PCV 23   [YearQuit] : 1980

## 2024-08-23 NOTE — PROCEDURE
[FreeTextEntry1] : JAKOB 8/23/24 Well preserved flow rates  Mild OAD  no decline NIOX  11 ppb WNL 8/23/24  NIOX  14 ppb  WNL  JAKOB 7/18/24 minimal OAD  NIOX  12 ppb WNL 5/30/24  JAKOB No BD 5/30/24  normalized flow  rates with interval improvement Mild OAD   NIOX 16 normal range no evidence of airway inflammation May first 2024 PFT follow-up May 1, 2024 Ratio 77 Flow rates normal range Lung capacity normal Lung volumes normal TLC 90% predicted Diffusion 79% predicted normal range Interval improvement of overall pulmonary physiology  PFT b12/28/23  mild  reduction flow  rates  LUNG VOLUMES NL dlco 66 % WITH MILD  LOSS FX  ALVEOLAR  CAPILLARY UNITS hgb 14.4  Chest x-ray PA lateral November 30, 2023 indication chest congestion Cardiac size is normal Positive scoliosis No sumaya parenchymal infiltrates pleural effusions dominant pulmonary nodules consolidation or pneumothorax Comparison to chest x-ray of 12/29/2022 no gross interval change appreciated  NIOx  18  ppb WNL 11/1/23  Jakob 11/1/23  flow  rates nl  very mild OAD noted mild pos interval improvement of flow  rates  NIOX 11 ppb WNL  9/20/23 [place] 9/20/23 Mild OAD No BD at FEV1  Pos  BD at small airways 26 %  NIOX 14  ppb WNL  8/9/23 PFT 8/9/23 Jakob mild OAD ratio 75  LUng Volumes nl TLC 94 %  DLCO  82 % HGB B13.4 interval improvement at TLC and DLCO  NIOX 12 ppb WNL 6/16/23  JAKOB 6/26/23  very mild OAD No BD at FEV1  NIOX  16  ppb WNL 5/15/23  [place] 5/15/23 minimal OAD 24 % BD at small airways  NIOX  14  ppb WNL 4/17/23  JAKOB No BD  very Mild OAD  Pos significant interval improvement at Flow  rates  NIOX  23  ppb interval improvement 4/7/23 PFT April 7, 2023 Mild reduction flow Mild obstructive ventilatory impairment No response to bronchodilator FEV1 28% response to bronchodilator at the small airways Lung volumes are normal No air trapping Diffusion low with normal range 74% predicted Hemoglobin 11.5 Wrist positive decline of the flow rates dating back to March 9, 2023 study Also noted since October 24, 2022 there is a greater than 600 cc decline of both the FEV1 and FVC as well as diffusion   [place] 3/9/23 well preserved flow rates mild OAD NIOX increased to 40 PPV March 9, 2023 consistent with bronchial inflammation  Spirometry December 29, 2022 Post influenza chest congestion Flow rates are normal FEV1 93% predicted Ratio 77 compared November 30, 2020 do demonstrate interval improvement at the FVC with a mild interval improvement at the FEV1  Chest x-ray PA lateral 12/29/2022 post influenza chest congestion Cardiac size is normal Lung fields are clear No parenchymal infiltrates pleural effusions or dominant pulmonary nodules No evidence for superimposed pneumonia Data comparison to April 27, 2022 does not demonstrate any interval change Impression clear lung   Spirometry November 30, 2022 Flow rates are within normal limits Although significant data of October 24, 2022 there is decline at the flow rates NIOx  7  ppb 11/30/22 nl range  PFT 10/24/22 flow rates nl  Lungs Volumes nl DLCo 86 % HGB 13.3 overall stable pulmonary physiology NIOX  9  ppb 10/24/22 nl range  Spirometry no bronchodilator August 3, 2022 Flow rates normal FEV1 57% predicted Ratio 84 Overall interval improvement of flow rates compared to [place] 15, 2022 NIOX 9 PPD normal range no evidence of active bronchial inflammation August 2, 2022  Pulmonary 6-minute walk exercise study August 3, 2022 Baseline room O2 saturation 96% Positive for minute desaturation 87% with at 5 minutes back up to 90% on room air with good recovery 94 to 96% Impression no evident evidence for indication for portable oxygen therapy protocol  PFT 6/15/22 Flow rates nl  Lung Volumes nl DLCO 83 %  HGB 11.3  CPAP data compliance Usage through June 14, 2022 Usage 100% Hours greater than 4 AutoSet CPAP 6 to 16 cm H2O AHI 1.2  Chest x-ray PA lateral April 27, 2022 Normal cardiac size Positive for scar right midlung zone No pleural effusions pneumothorax Bronchiectatic change right lower lung zone No evidence for parenchymal consolidation No interval change compared to chest x-ray October 25, 2021  PFT 3/17/22 Flow rates nl  Lung Volumes nl TLC 97 %  DLCO 89 % HGB 12.1  Chest x-ray PA lateral October 25, 2021 Cardiac size is normal Lower lumbar sacral scoliosis Bronchiectatic changes right lower lung zone No parenchymal consolidation pleural effusions pneumothorax Mediastinum hilum unremarkable IMP no evidence for pneumonia  PFT 10/6/21 Flow rates nl TLC 89 %  DLCO  87 % HGB 15.0  Sleep study April 29-April 30, 2021 Overall mild obstructive sleep apnea AHI 7 Time spent less than 90% on room air 4.3% Mean O2 saturation 94.5% Recommendation address treatment protocol with auto CPAP PFT 7/28/21 Flow Rtaes nl Lung Volumes nl TTLC 95 % DLCO 91 % nl HGB 15.0 PFT 4/22/21 Flow  rates nl Lung Volumes Nl DLCO 96 % nl range  HGB 11.2 NIOX  11 ppb 4/22/21  PFT with body box August 12, 2020 Normal flow rates Mild obstructive pattern with an FEV1 FVC 74 No bronchodilator response at FEV1 20% response at small airways Normal lung volumes are Noted increased % predicted. Specific inductance and resistance normal Diffusion normal 95% predicted. Hemoglobin 13.79  Pulmonary 6-minute walk step stress test August 12, 2020 Total steps 1/10/2017 Baseline room air O2 saturation 96% Desaturation at minute 6  89% with immediate recovery to 96% Impression minimal O2 desaturation No indication for portable oxygen therapy  Chest x-ray PA lateral August 12, 2020 Normal cardiac size Scarring left lower lung zone cannot exclude component of bronchiectasis Right lung is otherwise clear No evidence for jodi adenopathy No dominant pulmonary nodules or pneumothorax Impression bronchiectatic change left lower lung zone  Persistent mild reduction IgA normal range 70 Data review Alpha-1 antitrypsin titer negative 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

## 2024-08-27 ENCOUNTER — APPOINTMENT (OUTPATIENT)
Dept: HEMATOLOGY ONCOLOGY | Facility: CLINIC | Age: 74
End: 2024-08-27

## 2024-08-27 ENCOUNTER — RESULT REVIEW (OUTPATIENT)
Age: 74
End: 2024-08-27

## 2024-08-27 ENCOUNTER — APPOINTMENT (OUTPATIENT)
Dept: HEMATOLOGY ONCOLOGY | Facility: CLINIC | Age: 74
End: 2024-08-27
Payer: MEDICARE

## 2024-08-27 VITALS
OXYGEN SATURATION: 96 % | SYSTOLIC BLOOD PRESSURE: 135 MMHG | TEMPERATURE: 98.8 F | DIASTOLIC BLOOD PRESSURE: 79 MMHG | BODY MASS INDEX: 21.63 KG/M2 | RESPIRATION RATE: 16 BRPM | HEART RATE: 71 BPM | WEIGHT: 130 LBS

## 2024-08-27 DIAGNOSIS — Z85.820 PERSONAL HISTORY OF MALIGNANT MELANOMA OF SKIN: ICD-10-CM

## 2024-08-27 DIAGNOSIS — D47.2 MONOCLONAL GAMMOPATHY: ICD-10-CM

## 2024-08-27 DIAGNOSIS — I10 ESSENTIAL (PRIMARY) HYPERTENSION: ICD-10-CM

## 2024-08-27 DIAGNOSIS — E03.9 HYPOTHYROIDISM, UNSPECIFIED: ICD-10-CM

## 2024-08-27 LAB
BASOPHILS # BLD AUTO: 0.06 K/UL — SIGNIFICANT CHANGE UP (ref 0–0.2)
BASOPHILS NFR BLD AUTO: 1 % — SIGNIFICANT CHANGE UP (ref 0–2)
EOSINOPHIL # BLD AUTO: 0.19 K/UL — SIGNIFICANT CHANGE UP (ref 0–0.5)
EOSINOPHIL NFR BLD AUTO: 3.2 % — SIGNIFICANT CHANGE UP (ref 0–6)
HCT VFR BLD CALC: 37.6 % — SIGNIFICANT CHANGE UP (ref 34.5–45)
HGB BLD-MCNC: 12.9 G/DL — SIGNIFICANT CHANGE UP (ref 11.5–15.5)
IMM GRANULOCYTES NFR BLD AUTO: 0.3 % — SIGNIFICANT CHANGE UP (ref 0–0.9)
LYMPHOCYTES # BLD AUTO: 2.16 K/UL — SIGNIFICANT CHANGE UP (ref 1–3.3)
LYMPHOCYTES # BLD AUTO: 36.5 % — SIGNIFICANT CHANGE UP (ref 13–44)
MCHC RBC-ENTMCNC: 32.5 PG — SIGNIFICANT CHANGE UP (ref 27–34)
MCHC RBC-ENTMCNC: 34.3 G/DL — SIGNIFICANT CHANGE UP (ref 32–36)
MCV RBC AUTO: 94.7 FL — SIGNIFICANT CHANGE UP (ref 80–100)
MONOCYTES # BLD AUTO: 0.43 K/UL — SIGNIFICANT CHANGE UP (ref 0–0.9)
MONOCYTES NFR BLD AUTO: 7.3 % — SIGNIFICANT CHANGE UP (ref 2–14)
NEUTROPHILS # BLD AUTO: 3.06 K/UL — SIGNIFICANT CHANGE UP (ref 1.8–7.4)
NEUTROPHILS NFR BLD AUTO: 51.7 % — SIGNIFICANT CHANGE UP (ref 43–77)
NRBC # BLD: 0 /100 WBCS — SIGNIFICANT CHANGE UP (ref 0–0)
NRBC BLD-RTO: 0 /100 WBCS — SIGNIFICANT CHANGE UP (ref 0–0)
PLATELET # BLD AUTO: 283 K/UL — SIGNIFICANT CHANGE UP (ref 150–400)
RBC # BLD: 3.97 M/UL — SIGNIFICANT CHANGE UP (ref 3.8–5.2)
RBC # FLD: 13.7 % — SIGNIFICANT CHANGE UP (ref 10.3–14.5)
WBC # BLD: 5.92 K/UL — SIGNIFICANT CHANGE UP (ref 3.8–10.5)
WBC # FLD AUTO: 5.92 K/UL — SIGNIFICANT CHANGE UP (ref 3.8–10.5)

## 2024-08-27 PROCEDURE — G2211 COMPLEX E/M VISIT ADD ON: CPT

## 2024-08-27 PROCEDURE — 99215 OFFICE O/P EST HI 40 MIN: CPT

## 2024-08-27 NOTE — RESULTS/DATA
[FreeTextEntry1] : CBC 9/5/2023 hgb 12.8/ plt 307/wbc 6.57 CMP 03/2023 normal. TB slightly elevated 1.3 CMP 06/2023 Liver enzymes elevated / ALT 96. Rest of labs normal 08/27/2024 CBC WBC 5.92 HGB 12.9g/dL MCV 94.7  000

## 2024-08-27 NOTE — PHYSICAL EXAM
[Fully active, able to carry on all pre-disease performance without restriction] : Status 0 - Fully active, able to carry on all pre-disease performance without restriction [Normal] : affect appropriate No previous uterine incision/Armendariz Pregnancy/Less than or equal to 4 previous vaginal births/No known bleeding disorder/No history of postpartum hemorrhage/No other PPH risks indicated

## 2024-08-27 NOTE — HISTORY OF PRESENT ILLNESS
[Date: ____________] : Patient's last distress assessment performed on [unfilled]. [0 - No Distress] : Distress Level: 0 [100: Normal, no complaints, no evidence of disease.] : 100: Normal, no complaints, no evidence of disease. 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 She denied fever, chills, night sweats, N/V/D, weight loss, changes with urination and BMs.   To be noted, lab in 2004 showing Cr 0.9; Serum Kappa elevation; lambda 70. Normal IgG level lab in 7/2022 showed normal blood counts, Cr 0.97.  Lab in 9/2022 showed IgG 1464; IgA 82; Kappa FLC 2.48; Lambda FLC 1.23; K/L ratio 2.02.  There has been no evidence of renal disease or cardiac disease. 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 She lives with her .   Family history of colon cancer reported. Previously had removal of a large approximate 20 mm sessile serrated polyp as well as colonic adenoma. Recent colonoscopy 7/26/2022 Normal terminal ileum; marked sigmoid diverticulosis noted with muscular hypertrophy, luminal narrowing and fixation. Hemorrhoids detected.  [FreeTextEntry1] : herbal preparations.  [de-identified] : Patient states she is doing well. No major issues. She denies fevers, chest pain, SOB, rash, swelling, N/V/C/D, or weight changes. She recently completed doxycycline for URI infection. 08/27/2024 She describes her health over the past year as sickness home care once a month. she remained at home; used inhalational agents and oral pills recommended by a friend of Mg, pineapple and elderberry. No falls.

## 2024-08-27 NOTE — ASSESSMENT
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  plan -Will discuss lab results with patient once resulted.  -Increased monoclonal light chain level has been stable since 2004.  -continue to monitor MGUS symptoms and lab. Explained for pt that the risk of MGUS progresses to MM on average is about 1% each year according to data from [facility]. 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 she may benefit with vaccination against pneumococcus.  -RTC in one year for repeat serum immune electrophoresis.  08/27/2024 The patient has had events of upper respiratory illness over the past year. she is here for check of the monoclonal IgG protein 1 gram /dL. No skeletal events and she remains on oral medication. No hospitalization: her  is currently at [facility] he has diabetes. She appears well and has a normal physical examination. TO repeat blood studies today.

## 2024-08-31 LAB
ALBUMIN MFR SERPL ELPH: 58 %
ALBUMIN SERPL-MCNC: 4.1 G/DL
ALBUMIN/GLOB SERPL: 1.4 RATIO
ALPHA1 GLOB MFR SERPL ELPH: 3.3 %
ALPHA1 GLOB SERPL ELPH-MCNC: 0.2 G/DL
ALPHA2 GLOB MFR SERPL ELPH: 9.9 %
ALPHA2 GLOB SERPL ELPH-MCNC: 0.7 G/DL
B-GLOBULIN MFR SERPL ELPH: 9.2 %
B-GLOBULIN SERPL ELPH-MCNC: 0.6 G/DL
DEPRECATED KAPPA LC FREE/LAMBDA SER: 2.89 RATIO
GAMMA GLOB FLD ELPH-MCNC: 1.4 G/DL
GAMMA GLOB MFR SERPL ELPH: 19.6 %
IGA SER QL IEP: 86 MG/DL
IGG SER QL IEP: 1407 MG/DL
IGM SER QL IEP: 31 MG/DL
INTERPRETATION SERPL IEP-IMP: NORMAL
KAPPA LC CSF-MCNC: 1.09 MG/DL
KAPPA LC SERPL-MCNC: 3.15 MG/DL
M PROTEIN MFR SERPL ELPH: 15.1 %
M PROTEIN SPEC IFE-MCNC: NORMAL
MONOCLON BAND OBS SERPL: 1.1 G/DL
PROT SERPL-MCNC: 7 G/DL
PROT SERPL-MCNC: 7 G/DL

## 2024-09-23 ENCOUNTER — APPOINTMENT (OUTPATIENT)
Dept: PEDIATRIC ALLERGY IMMUNOLOGY | Facility: CLINIC | Age: 74
End: 2024-09-23

## 2024-09-23 VITALS
DIASTOLIC BLOOD PRESSURE: 75 MMHG | SYSTOLIC BLOOD PRESSURE: 121 MMHG | HEIGHT: 65 IN | WEIGHT: 135 LBS | OXYGEN SATURATION: 95 % | BODY MASS INDEX: 22.49 KG/M2 | HEART RATE: 72 BPM

## 2024-09-23 DIAGNOSIS — J30.89 OTHER ALLERGIC RHINITIS: ICD-10-CM

## 2024-09-23 DIAGNOSIS — D89.89 OTHER SPECIFIED DISORDERS INVOLVING THE IMMUNE MECHANISM, NOT ELSEWHERE CLASSIFIED: ICD-10-CM

## 2024-09-23 DIAGNOSIS — J45.909 UNSPECIFIED ASTHMA, UNCOMPLICATED: ICD-10-CM

## 2024-09-23 DIAGNOSIS — J30.81 ALLERGIC RHINITIS DUE TO ANIMAL (CAT) (DOG) HAIR AND DANDER: ICD-10-CM

## 2024-09-23 PROCEDURE — 95004 PERQ TESTS W/ALRGNC XTRCS: CPT

## 2024-09-23 PROCEDURE — 99214 OFFICE O/P EST MOD 30 MIN: CPT | Mod: 25

## 2024-09-25 ENCOUNTER — APPOINTMENT (OUTPATIENT)
Dept: PULMONOLOGY | Facility: CLINIC | Age: 74
End: 2024-09-25
Payer: MEDICARE

## 2024-09-25 VITALS — SYSTOLIC BLOOD PRESSURE: 133 MMHG | OXYGEN SATURATION: 97 % | DIASTOLIC BLOOD PRESSURE: 73 MMHG | HEART RATE: 69 BPM

## 2024-09-25 DIAGNOSIS — J44.9 CHRONIC OBSTRUCTIVE PULMONARY DISEASE, UNSPECIFIED: ICD-10-CM

## 2024-09-25 DIAGNOSIS — J47.9 BRONCHIECTASIS, UNCOMPLICATED: ICD-10-CM

## 2024-09-25 PROCEDURE — 95012 NITRIC OXIDE EXP GAS DETER: CPT

## 2024-09-25 PROCEDURE — 94060 EVALUATION OF WHEEZING: CPT

## 2024-09-25 PROCEDURE — 99214 OFFICE O/P EST MOD 30 MIN: CPT | Mod: 25

## 2024-09-25 NOTE — PROCEDURE
[FreeTextEntry1] : JAKOB 9/25/4 flow  rates WNL  No BD at FEV1 NIOX  12  ppb WNL  JAKOB 8/23/24 Well preserved flow rates  Mild OAD  no decline NIOX  11 ppb WNL 8/23/24  NIOX  14 ppb  WNL  JAKOB 7/18/24 minimal OAD  NIOX  12 ppb WNL 5/30/24  JAKOB No BD 5/30/24  normalized flow  rates with interval improvement Mild OAD   NIOX 16 normal range no evidence of airway inflammation May first 2024 PFT follow-up May 1, 2024 Ratio 77 Flow rates normal range Lung capacity normal Lung volumes normal TLC 90% predicted Diffusion 79% predicted normal range Interval improvement of overall pulmonary physiology  PFT b12/28/23  mild  reduction flow  rates  LUNG VOLUMES NL dlco 66 % WITH MILD  LOSS FX  ALVEOLAR  CAPILLARY UNITS hgb 14.4  Chest x-ray PA lateral November 30, 2023 indication chest congestion Cardiac size is normal Positive scoliosis No sumaya parenchymal infiltrates pleural effusions dominant pulmonary nodules consolidation or pneumothorax Comparison to chest x-ray of 12/29/2022 no gross interval change appreciated  NIOx  18  ppb WNL 11/1/23  Jakob 11/1/23  flow  rates nl  very mild OAD noted mild pos interval improvement of flow  rates  NIOX 11 ppb WNL  9/20/23 [place] 9/20/23 Mild OAD No BD at FEV1  Pos  BD at small airways 26 %  NIOX 14  ppb WNL  8/9/23 PFT 8/9/23 Jakob mild OAD ratio 75  LUng Volumes nl TLC 94 %  DLCO  82 % HGB B13.4 interval improvement at TLC and DLCO  NIOX 12 ppb WNL 6/16/23  JAKOB 6/26/23  very mild OAD No BD at FEV1  NIOX  16  ppb WNL 5/15/23  [place] 5/15/23 minimal OAD 24 % BD at small airways  NIOX  14  ppb WNL 4/17/23  JAKOB No BD  very Mild OAD  Pos significant interval improvement at Flow  rates  NIOX  23  ppb interval improvement 4/7/23 PFT April 7, 2023 Mild reduction flow Mild obstructive ventilatory impairment No response to bronchodilator FEV1 28% response to bronchodilator at the small airways Lung volumes are normal No air trapping Diffusion low with normal range 74% predicted Hemoglobin 11.5 Wrist positive decline of the flow rates dating back to March 9, 2023 study Also noted since October 24, 2022 there is a greater than 600 cc decline of both the FEV1 and FVC as well as diffusion   [place] 3/9/23 well preserved flow rates mild OAD NIOX increased to 40 PPV March 9, 2023 consistent with bronchial inflammation  Spirometry December 29, 2022 Post influenza chest congestion Flow rates are normal FEV1 93% predicted Ratio 77 compared November 30, 2020 do demonstrate interval improvement at the FVC with a mild interval improvement at the FEV1  Chest x-ray PA lateral 12/29/2022 post influenza chest congestion Cardiac size is normal Lung fields are clear No parenchymal infiltrates pleural effusions or dominant pulmonary nodules No evidence for superimposed pneumonia Data comparison to April 27, 2022 does not demonstrate any interval change Impression clear lung   Spirometry November 30, 2022 Flow rates are within normal limits Although significant data of October 24, 2022 there is decline at the flow rates NIOx  7  ppb 11/30/22 nl range  PFT 10/24/22 flow rates nl  Lungs Volumes nl DLCo 86 % HGB 13.3 overall stable pulmonary physiology NIOX  9  ppb 10/24/22 nl range  Spirometry no bronchodilator August 3, 2022 Flow rates normal FEV1 57% predicted Ratio 84 Overall interval improvement of flow rates compared to [place] 15, 2022 NIOX 9 PPD normal range no evidence of active bronchial inflammation August 2, 2022  Pulmonary 6-minute walk exercise study August 3, 2022 Baseline room O2 saturation 96% Positive for minute desaturation 87% with at 5 minutes back up to 90% on room air with good recovery 94 to 96% Impression no evident evidence for indication for portable oxygen therapy protocol  PFT 6/15/22 Flow rates nl  Lung Volumes nl DLCO 83 %  HGB 11.3  CPAP data compliance Usage through June 14, 2022 Usage 100% Hours greater than 4 AutoSet CPAP 6 to 16 cm H2O AHI 1.2  Chest x-ray PA lateral April 27, 2022 Normal cardiac size Positive for scar right midlung zone No pleural effusions pneumothorax Bronchiectatic change right lower lung zone No evidence for parenchymal consolidation No interval change compared to chest x-ray October 25, 2021  PFT 3/17/22 Flow rates nl  Lung Volumes nl TLC 97 %  DLCO 89 % HGB 12.1  Chest x-ray PA lateral October 25, 2021 Cardiac size is normal Lower lumbar sacral scoliosis Bronchiectatic changes right lower lung zone No parenchymal consolidation pleural effusions pneumothorax Mediastinum hilum unremarkable IMP no evidence for pneumonia  PFT 10/6/21 Flow rates nl TLC 89 %  DLCO  87 % HGB 15.0  Sleep study April 29-April 30, 2021 Overall mild obstructive sleep apnea AHI 7 Time spent less than 90% on room air 4.3% Mean O2 saturation 94.5% Recommendation address treatment protocol with auto CPAP PFT 7/28/21 Flow Rtaes nl Lung Volumes nl TTLC 95 % DLCO 91 % nl HGB 15.0 PFT 4/22/21 Flow  rates nl Lung Volumes Nl DLCO 96 % nl range  HGB 11.2 NIOX  11 ppb 4/22/21  PFT with body box August 12, 2020 Normal flow rates Mild obstructive pattern with an FEV1 FVC 74 No bronchodilator response at FEV1 20% response at small airways Normal lung volumes are Noted increased % predicted. Specific inductance and resistance normal Diffusion normal 95% predicted. Hemoglobin 13.79  Pulmonary 6-minute walk step stress test August 12, 2020 Total steps 1/10/2017 Baseline room air O2 saturation 96% Desaturation at minute 6  89% with immediate recovery to 96% Impression minimal O2 desaturation No indication for portable oxygen therapy  Chest x-ray PA lateral August 12, 2020 Normal cardiac size Scarring left lower lung zone cannot exclude component of bronchiectasis Right lung is otherwise clear No evidence for jodi adenopathy No dominant pulmonary nodules or pneumothorax Impression bronchiectatic change left lower lung zone  Persistent mild reduction IgA normal range 70 Data review Alpha-1 antitrypsin titer negative 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

## 2024-09-25 NOTE — HISTORY OF PRESENT ILLNESS
[Former] : former [TextBox_4] : c/o HA ?? related to controller therapy felt similar RTX about 1 1 /2 years ago  08/23/24 here for f/u, have not been sick in 5 months remains on controller therapy  NOT ACTIVE Chest congestion cough tightness wheeze  still with sxs completed antibx ceftin and prednisone taper Post tx Nebulizer  with improvement remains on Flovent self txed Neb with acetylcysteine self txed medrol  exposure grand  children URI  chills  but  no  fever  onset  last  FRIDAY  and tx pred x  days  with ibnc dose  Flovent 220 mcg  pos  chest tightness  heaviness bronchiectasis selective IgA deficiency Not as  active with sxs added symbicort addition Flovent 110 mcg dose post Strep dx Urgent care 4/8/23 and Z LILLIANA sxs  better overall only feels some chest  pressure noted stopped Spiriva felt caused hoarse and not helping otherwise no decline since last  visit with minimal use of KARLSO  Popst Knee surgery left without resp  complications  feels pos  side  effects Wixela  felt inc BP  with systemic  steroids  ICS- Flovent HFA  110 mcg  2 puffs   post Influ A pos chest congestion resolved fever did not  get Tamiflu  HEME IgA deficiency, 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) presents here for initial consultation about MGUS. To b noted, lab in 2004 showing Cr 0.9; Serum Kappa 398; lambda 70. lab in 7/2022 showed normal blood counts, Cr 0.97. Lab in 9/2022 showed IgG 1464; IgA 82; Kappa FLC 2.48; Lambda FLC 1.23; K/L ratio 2.02.  plan -Discussed with pt about lab results; increased monoclonal light chain level has been stable since 2004.  -continue to monitor MGUS symptoms and lab. Explained for pt that the risk of MGUS progresses to MM on average is about 1% each year according to data from [facility]. -RTC in one year  AI recommended HEME  72 year old female with DCIS s/p radiation and lumpectomy, Hashimoto thyroiditis, bronchiectasis, recurrent PNAs, reported allergies to multiple antibiotics presents for drug allergy evaluation.  MBL def.; needs mening vaccines A and B.  - delabeled from penicillin allergy, can use penicillins, cephalosporins as indicated REPORTED REACTION TO GENTAMICIN, VANCOMYCIN  There is a concern for RED MAN SYNDROME with IV Vancomycin. Vancomycin is a known trigger for mast cell degranulation via direct stimulation of mast cells and/or basophils, which may be due to bypass of the antibody-mediated pathway and activation of a mast cell-specific receptor called Mas-related G protein-coupled receptor X2 (MRGPRX2).  skin testing was inconclusive to GENTAMICIN, VANCOMYCIN  - avoid GENTAMICIN, VANCOMYCIN  = can repeat skin testing in the future - Based on history and positive skin testing, avoid levaquin - Cipro can be prescribed if indicated. Recommend 1st dose to be administered under physician's observation. can be done in my office.  SULFONAMIDE ANTIBIOTIC ALLERGY: Patient has a remote history of delayed benign rash when taking sulfa antibiotics. Unfortunately, there is no validated test available for sulfonamides.should continue avoiding sulfa antibiotics.  If/ when administration of sulfonamide is required, physician-supervised administration/graded challenge can be performed.  REPORTED REACTION TO CODEINE Codeine is a known trigger for mast cell degranulation via mechanism mentioned above.  Recommend avoidance.  ?ALLERGY TO IV CONTRAST  Pt cannot recall whether she reacted to IV contrast in the past. We asked that she obtain records to determine if additional testing is warranted. Of note however there is an ongoing national shortage of IV contrast and therefore testing is prohibited at this time. If the patient is unable to avoid the use of IV contrast then she should premedicate.  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 meningitidis IgG, Vaccine; Status:Active; Requested for:[id number];  Total Hemolytic Complement; Status:Resulted - Requires Verification;   Done: [id number] 03:44PM Discussion/Summary 72 year old female with PMH of DCIS s/p radiation, bronchiectasis, Hashimoto's, recurrent infections, multiple antibiotic, chronic urticaria,  allergies here for amoxicillin challenge.  AMOXICILLIN CHALLENGE: Today the patient, took 1050 mg of Amoxicillin in THREE  divided doses  in the office and she was observed for 1 hour. The challenge was started with 50 mg of Amoxicillin, then the dose was increased to 500 mg. Approximated, 45 minutes later she complained of mild pruritus on right upper extremity. 10 minutes later, she developed  erythematous rash with one hive on her neck. Vitals and Breath sounds are wnl. The rash self resolved in forty minutes, the dose was escalated to another 500mg, which was tolerated without any issues. The patient,  admitted to developing random hives without any triggers. We believer, her recent rash with amoxicillin most likely is associated with history of recurrent urticaria.  Patient developed one hive after Amoxicillin 500 mg, therefore was given an additional dose of Amoxicillin without new hives and initial hive resolved. Therefore, hives are likely due to underlying urticaria. -We will remove amoxicillin allergy from the patient's chart. Advised to use Amoxicillin or Penicillin in future with long acting antihistamine, such as Loratidine. Penicillin removed from the allergy list.  Low IgA and  72 year old female with DCIS s/p radiation and lumpectomy, Hashimoto thyroiditis, bronchiectasis, recurrent PNAs, reported allergies to multiple antibiotics presents for drug allergy evaluation.   skin testing to Vancomycin, Gentamicin, Levaquin, Cipro REPORTED REACTION TO GENTAMICIN, VANCOMYCIN  There is a concern for RED MAN SYNDROME with IV Vancomycin. Vancomycin is a known trigger for mast cell degranulation via direct stimulation of mast cells and/or basophils, which may be due to bypass of the antibody-mediated pathway and activation of a mast cell-specific receptor called Mas-related G protein-coupled receptor X2 (MRGPRX2). We will pursue additional evaluation to further investigate whether or not she has true IgE-mediated hypersensitivity.  REPORTED REACTION TO PENICILLIN Her remote history of skin "sinking in" after a penicillin shot is highly doubtful of true penicillin hypersensitivity. Would recommend amoxicillin challenge.  Patient will schedule an appointment for office graded challenge to Amoxicillin. If patient tolerates the 1st dose of Amoxicillin in the office without adverse effect, he/she will complete 3 days of treatment and we will observe for delayed reactions. If there is no delayed reaction observed, we will remove penicillin from drug allergy list.  We discussed that skin testing is predictive only for immediate, IgE-mediated allergic reactions. Patient's history is not consistent with immediate type of hypersensitivity. People with negative skin test, still have a chance of developing delayed hypersensitivity reaction. Currently, there are no scientifically validated tests for delayed reactions available. We discussed that many patients labeled with penicillin allergy are, in fact, not allergic to penicillin. Diagnosis of penicillin allergy leads to use of alternative, broader spectrum antibiotics and was demonstrated to cause higher rate of complications and morbidity.Patient was instructed to hold antihistamines, including topical intranasal and ocular antihistamine preparations, for at least 5 days before the testing. REPORTED REACTION TO IV LEVAQUIN, unclear reaction to PO CIPROFLOXACIN Can pursue additional testing to levaquin given concern for hypersensitivity. Ciprofloxacin (another fluoroquinolone) is a known trigger for mast cell degranulation via the mechanism mentioned above. Pt states she would like to pursue challenge for cipro since she is unsure if this antibiotic is problematic. REPORTED REACTION TO CODEINE Codeine is a known trigger for mast cell degranulation via mechanism mentioned above. Will hold off on further testing and recommend avoidance.  ?ALLERGY TO IV CONTRAST  Pt cannot recall whether she reacted to IV contrast in the past. We asked that she obtain records to determine if additional testing is warranted. Of note however there is an ongoing national shortage of IV contrast and therefore testing is prohibited at this time. If the patient is unable to avoid the use of IV contrast then she should premedicate.  RECURRENT PNEUMONIAS, SCREENING FOR IMMUNE DEFICIENCY; history of radiation therapy for DCIS: Given the history of RECURRENT INFECTIONS, it is reasonable to interrogate cellular, humoral, and complement arms of immunity. Laboratory testing was sent as stated below and results will be discussed when available. She states she received pneumonia vaccines although not sure which one, also received TDAP within the past 10 years. We will check her antibody responses to those vaccines. MIXED RHINITIS SPT showed positivity to feathers, grass, weeds which is inconsistent with her occasional symptoms throughout the year.  Topical intranasal treatments were offered for associated POST NASAL DRIP. She states she will consider these treatments when antibiotic testing is done so as not to interfere with results. States she will use nasal saline if needed for now which helps.    Just received RESMED CPAP device- better focus Did  not tolerate Oral appliance  chest  congestion more  acute cough  with pale yellow mucous Nocturnal symptoms cough leading to wheeze No purulent sputum reports clear to white- thick mucous No hemoptysis No fevers chills or sweats Has completed 2 courses of steroids 2 courses of antibiotics with persistence of the cough  MAEGAN issue MC coverage with oral appliance with f/u Sleep  dentist Dr Mckeon   post Fall months  ago and noted rib fx Right  Otherwise states doing very well with Wixela  No inc use KARLOS  70-year-old female  Complicated pulmonary history to be reviewed There is a diagnosis of asthma and she reports bronchiectasis with multiple histories of pneumonia at least 4-5 reported last very ill November December January with an additional illness in March She reports abnormal chest CTs and chest x-rays Last chest CT she reports was approximately 2018 where she was told of having scarlike tissue With these episodes she describes cough chest tightness wheezing sputum production Medications through the winter included Y UL P DARION in the nebulizer, Brovana right axilla sodium chloride nebulizer nasal Astelin nasal Flonase albuterol HFA course of prednisone and antibiotics including doxycycline x2 courses and Zithromax.  She states 1 to 1-1/2 weeks ago she developed some trouble breathing self started treatment with albuterol She did not receive any antibiotics or steroids with this symptomatology most recently noted She does not report at present any sputum production for the past several days and it is clear without hemoptysis purulence or foul smelling She also states that she was sick in March there was some question whether she could have COVID-19 but a COVID  19 PCR and antibody testing subsequently was negative Other significant history includes a bronchoscopy dating back to April 2019 which was negative including AFB fungus mold and  negative TBLBx. Last chest x-ray available for review dates back to April 16, 2019 which was status post a transbronchial biopsy. Thickening of the minor fissure which was noted and reported chronic Reticulonodular opacities right midlung Ill-defined focal opacity periphery left upper to mid lung with some central extension reported to the left hilum with out pneumothorax pleural effusion or significant volume loss.  Additional history Right breast DCIS treated radiation therapy 2017 Childhood polio Mild mitral regurgitation Hypothyroidism Nocturnal myoclonus  Vaccination profile flu vaccinations up-to-date 2018 2019 up-to-date with Prevnar and PCV 23   [YearQuit] : 1978

## 2024-09-30 PROBLEM — J30.81 ALLERGIC RHINITIS DUE TO DOGS: Status: ACTIVE | Noted: 2024-09-30

## 2024-09-30 PROBLEM — J30.89 ALLERGIC RHINITIS DUE TO DUST: Status: ACTIVE | Noted: 2024-09-30

## 2024-09-30 NOTE — HISTORY OF PRESENT ILLNESS
[de-identified] : 74 year old female with PMH of DCIS s/p radiation, bronchiectasis, Hashimoto's, recurrent infections, MBL deficiency, Low IGA, MGUS, multiple antibiotic allergies, here for Enviromental testing and Monveo.  .= delabeled from PCN allergy; negative amoxicillin challenge 10/17/22  = s/p Prevnar 20 10/24/22 and HIB 10/3/22 = 2022- SIGNIFICANT for borderline low IgA, IgG kappa band, nonprotective specific antibody titers to diphtheria, HIB and Strep. pneumoniae- +9/22, MBL-83, repeat MBL<70; CH50-40, repeat normal --- IgA- 61 in 2021, normal - 86 - 9/2023 --- pneum titers 3/2024- 0/22 (>1.3) after Prevnar 20 10/24/22 - lost titers --- HIB titers- 0.15 3/2024 after HIB immunization 10/3/22 Concern for specific antibody deficiency: - [place] test ID: IABCS. B-Cell Phenotyping Profile for Immunodeficiency and Immune Competence Assessment, Blood sent. Laboratories were ordered and blood work drawn by MICHAEL Durant.  9/23/2024: Pt. requested to get Menveo during next visit, has a mild headache today.   7/15/2024: -Currently, has poison IVY. Using hydrocortisone with improvement of the rash. -Recurrent infection- During winter she is on monthly oral antibiotic for URI, currently feeling well. Summer- No infections. -On Flovent 110 two puff BID.- followed by pulm. Asthma is well controlled    Past History: -On May 2024 is scheduled for MRI with contrast. In the past she had shellfish allergies, thus she is concerned about contrast allergy. Never has contrast. currently she is eating all shellfish without any issues.  -Also, would like to test to Bactrim. - Sulfa antibiotic- possibly rash in her 30s, she is not sure  -last week, she has URI was placed on Zpack, medrol dose pack. CUrrently feeling well. -Flonase.  flovent. Asthma- followed by pulm. Hives are well controlled with Allergra  Has bronciectasis.= delabeled from PCN allergy; negative amoxicillin challenge 10/17/22 = s/p Prevnar 20 10/24/22 and HIB 10/3/22  = Initial immune laboratories were SIGNIFICANT for borderline low IgA, IgGkappa band, nonprotective specific antibody titers to diphtheria, HIB and Strep. pneumoniae- +9/22, MBL-83, repeat MBL<70; CH50-40, repeat normal  1/10/2023: - She is feeling better today. No antihistamines in preparation for skin testing.    HISTORY: 1/3/2022: She had FLU last week and was started on Doxycycline for persistent cough. Yesterday Dr Stein added Wixela. patient has a headache and mildly elevated BP and thinks this is from Wixela. As per Dr Stein's notes, she has had headaches before.    11/15/2022 - She traveled to [place]: was bitten by a horsefly, developed cellulitis, just completed Keflex. She fell, concern for a broken rib.    DRUG ALLERGY Gentamicin, vancomycin: Years ago was at [facility] getting hysterectomy and was given abx prophylactically, both at the same time. Developed hives right after the first dose, not sure which one caused it. No angioedema. No respiratory symptoms. Avoids.    - Codeine: 15-20 years ago got codeine for pain after hysteroscopy, developed hives right after the first or second dose. Avoids.    - Levaquin: had previously tolerated in PO form but when she was hospitalized at [facility] for PNA she received levaquin. Reports that her whole body turned red and she felt hot, she is unsure if this occurred immediately after or the day after. No hives or swelling. Is unsure if this was true allergy.    - Sulfa antibiotic- possibly rash in her 30s, she is not sure    - Penicillin: as a child she states she received a penicillin injection does not know what indication was. The next day the skin "sunk in." No redness or itchiness. Has been avoiding it since then. Cannot remember additional details.    - Has a paper with her from Dr. Boxer 10 years ago that states she can take clinda, tetracyline, cephalosporins. She has written down cipro, doxy, keflex, macrobid, macrobid. She states she "doesn't know if she can take these anymore" since it's been many years.    IV contrast is listed on allergy tab however she cannot recall the circumstances and whether or not she had reaction. Same for sulfa allergies.    She denies any issues with hives other than as mentioned above.    INFECTION HISTORY  Reports that she "never gets just a cold." Always turns into bronchitis or PNA. Follows Dr. Stein.  States that as a child she would get sick frequently, would always get abx. Never had unusual infections. States she received chest irradiation in Feb 2018. Notes that she had PNA back in 2015 even before radiation. Hospitalized for it and received IV Levaquin. Total of 5 PNA's since that time radiographically confirmed. IGG subsets in 2020 showed elevated IgG1, but low IGG2 and IGG3. No miscarriages.    FHx: colon cancer in mom, prostate ca in brother, younger sister had endometrial cancer. No history or symptoms of allergic rhinitis, eczematous rashes, food allergies.

## 2024-09-30 NOTE — HISTORY OF PRESENT ILLNESS
[de-identified] : 74 year old female with PMH of DCIS s/p radiation, bronchiectasis, Hashimoto's, recurrent infections, MBL deficiency, Low IGA, MGUS, multiple antibiotic allergies, here for Enviromental testing and Monveo.  .= delabeled from PCN allergy; negative amoxicillin challenge 10/17/22  = s/p Prevnar 20 10/24/22 and HIB 10/3/22 = 2022- SIGNIFICANT for borderline low IgA, IgG kappa band, nonprotective specific antibody titers to diphtheria, HIB and Strep. pneumoniae- +9/22, MBL-83, repeat MBL<70; CH50-40, repeat normal --- IgA- 61 in 2021, normal - 86 - 9/2023 --- pneum titers 3/2024- 0/22 (>1.3) after Prevnar 20 10/24/22 - lost titers --- HIB titers- 0.15 3/2024 after HIB immunization 10/3/22 Concern for specific antibody deficiency: - [place] test ID: IABCS. B-Cell Phenotyping Profile for Immunodeficiency and Immune Competence Assessment, Blood sent. Laboratories were ordered and blood work drawn by MICHAEL Durant.  9/23/2024: Pt. requested to get Menveo during next visit, has a mild headache today.   7/15/2024: -Currently, has poison IVY. Using hydrocortisone with improvement of the rash. -Recurrent infection- During winter she is on monthly oral antibiotic for URI, currently feeling well. Summer- No infections. -On Flovent 110 two puff BID.- followed by pulm. Asthma is well controlled    Past History: -On May 2024 is scheduled for MRI with contrast. In the past she had shellfish allergies, thus she is concerned about contrast allergy. Never has contrast. currently she is eating all shellfish without any issues.  -Also, would like to test to Bactrim. - Sulfa antibiotic- possibly rash in her 30s, she is not sure  -last week, she has URI was placed on Zpack, medrol dose pack. CUrrently feeling well. -Flonase.  flovent. Asthma- followed by pulm. Hives are well controlled with Allergra  Has bronciectasis.= delabeled from PCN allergy; negative amoxicillin challenge 10/17/22 = s/p Prevnar 20 10/24/22 and HIB 10/3/22  = Initial immune laboratories were SIGNIFICANT for borderline low IgA, IgGkappa band, nonprotective specific antibody titers to diphtheria, HIB and Strep. pneumoniae- +9/22, MBL-83, repeat MBL<70; CH50-40, repeat normal  1/10/2023: - She is feeling better today. No antihistamines in preparation for skin testing.    HISTORY: 1/3/2022: She had FLU last week and was started on Doxycycline for persistent cough. Yesterday Dr Stein added Wixela. patient has a headache and mildly elevated BP and thinks this is from Wixela. As per Dr Stein's notes, she has had headaches before.    11/15/2022 - She traveled to [place]: was bitten by a horsefly, developed cellulitis, just completed Keflex. She fell, concern for a broken rib.    DRUG ALLERGY Gentamicin, vancomycin: Years ago was at [facility] getting hysterectomy and was given abx prophylactically, both at the same time. Developed hives right after the first dose, not sure which one caused it. No angioedema. No respiratory symptoms. Avoids.    - Codeine: 15-20 years ago got codeine for pain after hysteroscopy, developed hives right after the first or second dose. Avoids.    - Levaquin: had previously tolerated in PO form but when she was hospitalized at [facility] for PNA she received levaquin. Reports that her whole body turned red and she felt hot, she is unsure if this occurred immediately after or the day after. No hives or swelling. Is unsure if this was true allergy.    - Sulfa antibiotic- possibly rash in her 30s, she is not sure    - Penicillin: as a child she states she received a penicillin injection does not know what indication was. The next day the skin "sunk in." No redness or itchiness. Has been avoiding it since then. Cannot remember additional details.    - Has a paper with her from Dr. Boxer 10 years ago that states she can take clinda, tetracyline, cephalosporins. She has written down cipro, doxy, keflex, macrobid, macrobid. She states she "doesn't know if she can take these anymore" since it's been many years.    IV contrast is listed on allergy tab however she cannot recall the circumstances and whether or not she had reaction. Same for sulfa allergies.    She denies any issues with hives other than as mentioned above.    INFECTION HISTORY  Reports that she "never gets just a cold." Always turns into bronchitis or PNA. Follows Dr. Stein.  States that as a child she would get sick frequently, would always get abx. Never had unusual infections. States she received chest irradiation in Feb 2018. Notes that she had PNA back in 2015 even before radiation. Hospitalized for it and received IV Levaquin. Total of 5 PNA's since that time radiographically confirmed. IGG subsets in 2020 showed elevated IgG1, but low IGG2 and IGG3. No miscarriages.    FHx: colon cancer in mom, prostate ca in brother, younger sister had endometrial cancer. No history or symptoms of allergic rhinitis, eczematous rashes, food allergies.

## 2024-09-30 NOTE — REVIEW OF SYSTEMS
[Nl] : Integumentary [Fatigue] : no fatigue [Fever] : no fever [Eye Discharge] : no eye discharge [Eye Redness] : no redness [Puffy Eyelids] : no puffy ~T eyelids [Bloodshot Eyes] : no bloodshot ~T eyes [Nosebleeds] : no epistaxis [Rhinorrhea] : no rhinorrhea [Throat Itching] : no throat itching [Post Nasal Drip] : no post nasal drip [Sneezing] : no sneezing [Cough] : no cough [Wheezing Worsens With Exercise] : wheezing does not worsen with exercise [Wheezing] : no wheezing [Vomiting] : no vomiting [Diarrhea] : no diarrhea

## 2024-09-30 NOTE — HISTORY OF PRESENT ILLNESS
[de-identified] : 74 year old female with PMH of DCIS s/p radiation, bronchiectasis, Hashimoto's, recurrent infections, MBL deficiency, Low IGA, MGUS, multiple antibiotic allergies, here for Enviromental testing and Monveo.  .= delabeled from PCN allergy; negative amoxicillin challenge 10/17/22  = s/p Prevnar 20 10/24/22 and HIB 10/3/22 = 2022- SIGNIFICANT for borderline low IgA, IgG kappa band, nonprotective specific antibody titers to diphtheria, HIB and Strep. pneumoniae- +9/22, MBL-83, repeat MBL<70; CH50-40, repeat normal --- IgA- 61 in 2021, normal - 86 - 9/2023 --- pneum titers 3/2024- 0/22 (>1.3) after Prevnar 20 10/24/22 - lost titers --- HIB titers- 0.15 3/2024 after HIB immunization 10/3/22 Concern for specific antibody deficiency: - [place] test ID: IABCS. B-Cell Phenotyping Profile for Immunodeficiency and Immune Competence Assessment, Blood sent. Laboratories were ordered and blood work drawn by MICHAEL Durant.  9/23/2024: Pt. requested to get Menveo during next visit, has a mild headache today.   7/15/2024: -Currently, has poison IVY. Using hydrocortisone with improvement of the rash. -Recurrent infection- During winter she is on monthly oral antibiotic for URI, currently feeling well. Summer- No infections. -On Flovent 110 two puff BID.- followed by pulm. Asthma is well controlled    Past History: -On May 2024 is scheduled for MRI with contrast. In the past she had shellfish allergies, thus she is concerned about contrast allergy. Never has contrast. currently she is eating all shellfish without any issues.  -Also, would like to test to Bactrim. - Sulfa antibiotic- possibly rash in her 30s, she is not sure  -last week, she has URI was placed on Zpack, medrol dose pack. CUrrently feeling well. -Flonase.  flovent. Asthma- followed by pulm. Hives are well controlled with Allergra  Has bronciectasis.= delabeled from PCN allergy; negative amoxicillin challenge 10/17/22 = s/p Prevnar 20 10/24/22 and HIB 10/3/22  = Initial immune laboratories were SIGNIFICANT for borderline low IgA, IgGkappa band, nonprotective specific antibody titers to diphtheria, HIB and Strep. pneumoniae- +9/22, MBL-83, repeat MBL<70; CH50-40, repeat normal  1/10/2023: - She is feeling better today. No antihistamines in preparation for skin testing.    HISTORY: 1/3/2022: She had FLU last week and was started on Doxycycline for persistent cough. Yesterday Dr Stein added Wixela. patient has a headache and mildly elevated BP and thinks this is from Wixela. As per Dr Stein's notes, she has had headaches before.    11/15/2022 - She traveled to [place]: was bitten by a horsefly, developed cellulitis, just completed Keflex. She fell, concern for a broken rib.    DRUG ALLERGY Gentamicin, vancomycin: Years ago was at [facility] getting hysterectomy and was given abx prophylactically, both at the same time. Developed hives right after the first dose, not sure which one caused it. No angioedema. No respiratory symptoms. Avoids.    - Codeine: 15-20 years ago got codeine for pain after hysteroscopy, developed hives right after the first or second dose. Avoids.    - Levaquin: had previously tolerated in PO form but when she was hospitalized at [facility] for PNA she received levaquin. Reports that her whole body turned red and she felt hot, she is unsure if this occurred immediately after or the day after. No hives or swelling. Is unsure if this was true allergy.    - Sulfa antibiotic- possibly rash in her 30s, she is not sure    - Penicillin: as a child she states she received a penicillin injection does not know what indication was. The next day the skin "sunk in." No redness or itchiness. Has been avoiding it since then. Cannot remember additional details.    - Has a paper with her from Dr. Boxer 10 years ago that states she can take clinda, tetracyline, cephalosporins. She has written down cipro, doxy, keflex, macrobid, macrobid. She states she "doesn't know if she can take these anymore" since it's been many years.    IV contrast is listed on allergy tab however she cannot recall the circumstances and whether or not she had reaction. Same for sulfa allergies.    She denies any issues with hives other than as mentioned above.    INFECTION HISTORY  Reports that she "never gets just a cold." Always turns into bronchitis or PNA. Follows Dr. Stein.  States that as a child she would get sick frequently, would always get abx. Never had unusual infections. States she received chest irradiation in Feb 2018. Notes that she had PNA back in 2015 even before radiation. Hospitalized for it and received IV Levaquin. Total of 5 PNA's since that time radiographically confirmed. IGG subsets in 2020 showed elevated IgG1, but low IGG2 and IGG3. No miscarriages.    FHx: colon cancer in mom, prostate ca in brother, younger sister had endometrial cancer. No history or symptoms of allergic rhinitis, eczematous rashes, food allergies.

## 2024-10-17 ENCOUNTER — TRANSCRIPTION ENCOUNTER (OUTPATIENT)
Age: 74
End: 2024-10-17

## 2024-10-21 ENCOUNTER — APPOINTMENT (OUTPATIENT)
Dept: CT IMAGING | Facility: CLINIC | Age: 74
End: 2024-10-21
Payer: SELF-PAY

## 2024-10-21 ENCOUNTER — OUTPATIENT (OUTPATIENT)
Dept: OUTPATIENT SERVICES | Facility: HOSPITAL | Age: 74
LOS: 1 days | End: 2024-10-21
Payer: SELF-PAY

## 2024-10-21 DIAGNOSIS — Z90.49 ACQUIRED ABSENCE OF OTHER SPECIFIED PARTS OF DIGESTIVE TRACT: Chronic | ICD-10-CM

## 2024-10-21 DIAGNOSIS — Z00.8 ENCOUNTER FOR OTHER GENERAL EXAMINATION: ICD-10-CM

## 2024-10-21 DIAGNOSIS — E78.5 HYPERLIPIDEMIA, UNSPECIFIED: ICD-10-CM

## 2024-10-21 PROCEDURE — 75571 CT HRT W/O DYE W/CA TEST: CPT | Mod: MH

## 2024-10-21 PROCEDURE — 75571 CT HRT W/O DYE W/CA TEST: CPT | Mod: 26,MH

## 2024-10-23 ENCOUNTER — APPOINTMENT (OUTPATIENT)
Dept: PULMONOLOGY | Facility: CLINIC | Age: 74
End: 2024-10-23
Payer: MEDICARE

## 2024-10-23 VITALS — DIASTOLIC BLOOD PRESSURE: 67 MMHG | SYSTOLIC BLOOD PRESSURE: 123 MMHG | OXYGEN SATURATION: 94 % | HEART RATE: 70 BPM

## 2024-10-23 DIAGNOSIS — R09.89 OTHER SPECIFIED SYMPTOMS AND SIGNS INVOLVING THE CIRCULATORY AND RESPIRATORY SYSTEMS: ICD-10-CM

## 2024-10-23 DIAGNOSIS — J47.9 BRONCHIECTASIS, UNCOMPLICATED: ICD-10-CM

## 2024-10-23 DIAGNOSIS — J30.89 OTHER ALLERGIC RHINITIS: ICD-10-CM

## 2024-10-23 DIAGNOSIS — J44.9 CHRONIC OBSTRUCTIVE PULMONARY DISEASE, UNSPECIFIED: ICD-10-CM

## 2024-10-23 DIAGNOSIS — J44.89 OTHER SPECIFIED CHRONIC OBSTRUCTIVE PULMONARY DISEASE: ICD-10-CM

## 2024-10-23 PROCEDURE — 95012 NITRIC OXIDE EXP GAS DETER: CPT

## 2024-10-23 PROCEDURE — 99214 OFFICE O/P EST MOD 30 MIN: CPT | Mod: 25

## 2024-10-23 PROCEDURE — 71045 X-RAY EXAM CHEST 1 VIEW: CPT

## 2024-10-23 PROCEDURE — 94060 EVALUATION OF WHEEZING: CPT

## 2024-10-23 RX ORDER — ZAFIRLUKAST 20 MG/1
20 TABLET, COATED ORAL
Qty: 60 | Refills: 3 | Status: ACTIVE | COMMUNITY
Start: 2024-10-23 | End: 1900-01-01

## 2024-10-23 RX ORDER — AZELASTINE HYDROCHLORIDE 137 UG/1
137 SPRAY, METERED NASAL
Qty: 1 | Refills: 3 | Status: ACTIVE | COMMUNITY
Start: 2024-10-23 | End: 1900-01-01

## 2024-10-24 ENCOUNTER — APPOINTMENT (OUTPATIENT)
Dept: DERMATOLOGY | Facility: CLINIC | Age: 74
End: 2024-10-24
Payer: MEDICARE

## 2024-10-24 VITALS — WEIGHT: 125 LBS | BODY MASS INDEX: 20.83 KG/M2 | HEIGHT: 65 IN

## 2024-10-24 DIAGNOSIS — D22.9 MELANOCYTIC NEVI, UNSPECIFIED: ICD-10-CM

## 2024-10-24 DIAGNOSIS — L82.1 OTHER SEBORRHEIC KERATOSIS: ICD-10-CM

## 2024-10-24 DIAGNOSIS — D17.71 BENIGN LIPOMATOUS NEOPLASM OF KIDNEY: ICD-10-CM

## 2024-10-24 DIAGNOSIS — D18.01 HEMANGIOMA OF SKIN AND SUBCUTANEOUS TISSUE: ICD-10-CM

## 2024-10-24 PROCEDURE — 99213 OFFICE O/P EST LOW 20 MIN: CPT

## 2024-10-25 PROBLEM — D17.71 ANGIOMYOLIPOMA OF KIDNEY: Status: ACTIVE | Noted: 2019-01-11

## 2024-10-25 PROBLEM — L82.1 SEBORRHEIC KERATOSES: Status: ACTIVE | Noted: 2024-10-25

## 2024-10-25 PROBLEM — D22.9 BENIGN NEVUS: Status: ACTIVE | Noted: 2024-10-25

## 2024-10-28 ENCOUNTER — APPOINTMENT (OUTPATIENT)
Dept: PEDIATRIC ALLERGY IMMUNOLOGY | Facility: CLINIC | Age: 74
End: 2024-10-28

## 2024-10-28 VITALS — WEIGHT: 129.2 LBS | HEART RATE: 79 BPM | BODY MASS INDEX: 21.5 KG/M2

## 2024-10-28 DIAGNOSIS — J06.9 ACUTE UPPER RESPIRATORY INFECTION, UNSPECIFIED: ICD-10-CM

## 2024-10-28 PROCEDURE — 99213 OFFICE O/P EST LOW 20 MIN: CPT

## 2024-12-18 ENCOUNTER — NON-APPOINTMENT (OUTPATIENT)
Age: 74
End: 2024-12-18

## 2024-12-18 ENCOUNTER — APPOINTMENT (OUTPATIENT)
Dept: DERMATOLOGY | Facility: CLINIC | Age: 74
End: 2024-12-18
Payer: MEDICARE

## 2024-12-18 VITALS — WEIGHT: 125 LBS | BODY MASS INDEX: 20.83 KG/M2 | HEIGHT: 65 IN

## 2024-12-18 DIAGNOSIS — D18.01 HEMANGIOMA OF SKIN AND SUBCUTANEOUS TISSUE: ICD-10-CM

## 2024-12-18 DIAGNOSIS — Z12.83 ENCOUNTER FOR SCREENING FOR MALIGNANT NEOPLASM OF SKIN: ICD-10-CM

## 2024-12-18 DIAGNOSIS — D48.5 NEOPLASM OF UNCERTAIN BEHAVIOR OF SKIN: ICD-10-CM

## 2024-12-18 DIAGNOSIS — Z85.820 PERSONAL HISTORY OF MALIGNANT MELANOMA OF SKIN: ICD-10-CM

## 2024-12-18 PROCEDURE — 99213 OFFICE O/P EST LOW 20 MIN: CPT | Mod: 25

## 2024-12-18 PROCEDURE — 11102 TANGNTL BX SKIN SINGLE LES: CPT

## 2024-12-20 ENCOUNTER — APPOINTMENT (OUTPATIENT)
Dept: PEDIATRIC ALLERGY IMMUNOLOGY | Facility: CLINIC | Age: 74
End: 2024-12-20
Payer: MEDICARE

## 2024-12-20 VITALS
BODY MASS INDEX: 20.83 KG/M2 | WEIGHT: 125 LBS | DIASTOLIC BLOOD PRESSURE: 84 MMHG | OXYGEN SATURATION: 95 % | HEIGHT: 65 IN | SYSTOLIC BLOOD PRESSURE: 143 MMHG | HEART RATE: 72 BPM

## 2024-12-20 DIAGNOSIS — D89.89 OTHER SPECIFIED DISORDERS INVOLVING THE IMMUNE MECHANISM, NOT ELSEWHERE CLASSIFIED: ICD-10-CM

## 2024-12-20 PROCEDURE — 99213 OFFICE O/P EST LOW 20 MIN: CPT | Mod: 25

## 2024-12-26 ENCOUNTER — APPOINTMENT (OUTPATIENT)
Dept: DERMATOLOGY | Facility: CLINIC | Age: 74
End: 2024-12-26
Payer: MEDICARE

## 2024-12-26 VITALS — WEIGHT: 125 LBS | HEIGHT: 65 IN | BODY MASS INDEX: 20.83 KG/M2

## 2024-12-26 DIAGNOSIS — L30.9 DERMATITIS, UNSPECIFIED: ICD-10-CM

## 2024-12-26 PROCEDURE — 99213 OFFICE O/P EST LOW 20 MIN: CPT

## 2024-12-26 PROCEDURE — G2211 COMPLEX E/M VISIT ADD ON: CPT

## 2024-12-26 RX ORDER — MUPIROCIN 20 MG/G
2 OINTMENT TOPICAL
Qty: 1 | Refills: 0 | Status: ACTIVE | COMMUNITY
Start: 2024-12-26 | End: 1900-01-01

## 2025-01-06 ENCOUNTER — NON-APPOINTMENT (OUTPATIENT)
Age: 75
End: 2025-01-06

## 2025-01-07 ENCOUNTER — NON-APPOINTMENT (OUTPATIENT)
Age: 75
End: 2025-01-07

## 2025-01-09 ENCOUNTER — TRANSCRIPTION ENCOUNTER (OUTPATIENT)
Age: 75
End: 2025-01-09

## 2025-01-13 ENCOUNTER — TRANSCRIPTION ENCOUNTER (OUTPATIENT)
Age: 75
End: 2025-01-13

## 2025-01-19 NOTE — HISTORY OF PRESENT ILLNESS
[Y] : Patient is sexually active [Monogamous (Male Partner)] : is monogamous with a male partner [TextBox_4] : Patient had s/p TAHBSO. \par Patient had DCIS- followed by Dr Garrido and Dr Huerta\par Patient recently fractured her rib [Mammogramdate] : 2020 [ColonoscopyDate] : 2020 [PGHxTotal] : 2 [[facility] : [facility] [[facility] : 2 None

## 2025-03-04 ENCOUNTER — APPOINTMENT (OUTPATIENT)
Dept: ULTRASOUND IMAGING | Facility: IMAGING CENTER | Age: 75
End: 2025-03-04
Payer: MEDICARE

## 2025-03-04 ENCOUNTER — APPOINTMENT (OUTPATIENT)
Dept: MAMMOGRAPHY | Facility: IMAGING CENTER | Age: 75
End: 2025-03-04
Payer: MEDICARE

## 2025-03-04 ENCOUNTER — OUTPATIENT (OUTPATIENT)
Dept: OUTPATIENT SERVICES | Facility: HOSPITAL | Age: 75
LOS: 1 days | End: 2025-03-04
Payer: MEDICARE

## 2025-03-04 DIAGNOSIS — Z90.49 ACQUIRED ABSENCE OF OTHER SPECIFIED PARTS OF DIGESTIVE TRACT: Chronic | ICD-10-CM

## 2025-03-04 DIAGNOSIS — Z00.8 ENCOUNTER FOR OTHER GENERAL EXAMINATION: ICD-10-CM

## 2025-03-04 PROCEDURE — 77066 DX MAMMO INCL CAD BI: CPT

## 2025-03-04 PROCEDURE — 77066 DX MAMMO INCL CAD BI: CPT | Mod: 26

## 2025-03-04 PROCEDURE — 77063 BREAST TOMOSYNTHESIS BI: CPT

## 2025-03-04 PROCEDURE — 76641 ULTRASOUND BREAST COMPLETE: CPT | Mod: 26,50,GA

## 2025-03-04 PROCEDURE — G0279: CPT | Mod: 26

## 2025-03-04 PROCEDURE — 77067 SCR MAMMO BI INCL CAD: CPT

## 2025-03-04 PROCEDURE — 76641 ULTRASOUND BREAST COMPLETE: CPT

## 2025-03-04 PROCEDURE — G0279: CPT

## 2025-03-10 ENCOUNTER — NON-APPOINTMENT (OUTPATIENT)
Age: 75
End: 2025-03-10

## 2025-03-10 ENCOUNTER — APPOINTMENT (OUTPATIENT)
Dept: DERMATOLOGY | Facility: CLINIC | Age: 75
End: 2025-03-10
Payer: MEDICARE

## 2025-03-10 DIAGNOSIS — D48.5 NEOPLASM OF UNCERTAIN BEHAVIOR OF SKIN: ICD-10-CM

## 2025-03-10 PROCEDURE — 11102 TANGNTL BX SKIN SINGLE LES: CPT

## 2025-03-10 PROCEDURE — 99213 OFFICE O/P EST LOW 20 MIN: CPT | Mod: 25

## 2025-03-12 ENCOUNTER — APPOINTMENT (OUTPATIENT)
Dept: PULMONOLOGY | Facility: CLINIC | Age: 75
End: 2025-03-12
Payer: MEDICARE

## 2025-03-12 VITALS — DIASTOLIC BLOOD PRESSURE: 74 MMHG | HEART RATE: 71 BPM | SYSTOLIC BLOOD PRESSURE: 135 MMHG | OXYGEN SATURATION: 97 %

## 2025-03-12 DIAGNOSIS — J44.9 CHRONIC OBSTRUCTIVE PULMONARY DISEASE, UNSPECIFIED: ICD-10-CM

## 2025-03-12 LAB — POCT - HEMOGLOBIN (HGB), QUANTITATIVE, TRANSCUTANEOUS: 14.6

## 2025-03-12 PROCEDURE — 88738 HGB QUANT TRANSCUTANEOUS: CPT

## 2025-03-12 PROCEDURE — 99214 OFFICE O/P EST MOD 30 MIN: CPT | Mod: 25

## 2025-03-12 PROCEDURE — 94729 DIFFUSING CAPACITY: CPT

## 2025-03-12 PROCEDURE — 95012 NITRIC OXIDE EXP GAS DETER: CPT

## 2025-03-12 PROCEDURE — 94010 BREATHING CAPACITY TEST: CPT

## 2025-03-12 PROCEDURE — 94727 GAS DIL/WSHOT DETER LNG VOL: CPT

## 2025-03-13 ENCOUNTER — NON-APPOINTMENT (OUTPATIENT)
Age: 75
End: 2025-03-13

## 2025-03-13 ENCOUNTER — APPOINTMENT (OUTPATIENT)
Dept: CARDIOLOGY | Facility: CLINIC | Age: 75
End: 2025-03-13
Payer: MEDICARE

## 2025-03-13 VITALS
SYSTOLIC BLOOD PRESSURE: 128 MMHG | OXYGEN SATURATION: 97 % | BODY MASS INDEX: 22.3 KG/M2 | WEIGHT: 134 LBS | HEART RATE: 76 BPM | DIASTOLIC BLOOD PRESSURE: 76 MMHG

## 2025-03-13 DIAGNOSIS — J47.9 BRONCHIECTASIS, UNCOMPLICATED: ICD-10-CM

## 2025-03-13 DIAGNOSIS — I10 ESSENTIAL (PRIMARY) HYPERTENSION: ICD-10-CM

## 2025-03-13 DIAGNOSIS — D47.2 MONOCLONAL GAMMOPATHY: ICD-10-CM

## 2025-03-13 DIAGNOSIS — E04.1 NONTOXIC SINGLE THYROID NODULE: ICD-10-CM

## 2025-03-13 PROCEDURE — 99214 OFFICE O/P EST MOD 30 MIN: CPT

## 2025-03-13 PROCEDURE — 93000 ELECTROCARDIOGRAM COMPLETE: CPT

## 2025-03-13 PROCEDURE — G2211 COMPLEX E/M VISIT ADD ON: CPT

## 2025-03-17 LAB — DERMATOLOGY BIOPSY: NORMAL

## 2025-04-26 ENCOUNTER — EMERGENCY (EMERGENCY)
Facility: HOSPITAL | Age: 75
LOS: 1 days | End: 2025-04-26
Attending: STUDENT IN AN ORGANIZED HEALTH CARE EDUCATION/TRAINING PROGRAM | Admitting: STUDENT IN AN ORGANIZED HEALTH CARE EDUCATION/TRAINING PROGRAM
Payer: MEDICARE

## 2025-04-26 VITALS
RESPIRATION RATE: 18 BRPM | SYSTOLIC BLOOD PRESSURE: 174 MMHG | TEMPERATURE: 98 F | OXYGEN SATURATION: 95 % | DIASTOLIC BLOOD PRESSURE: 94 MMHG | WEIGHT: 130.07 LBS | HEART RATE: 71 BPM

## 2025-04-26 DIAGNOSIS — Z90.49 ACQUIRED ABSENCE OF OTHER SPECIFIED PARTS OF DIGESTIVE TRACT: Chronic | ICD-10-CM

## 2025-04-26 PROCEDURE — 99284 EMERGENCY DEPT VISIT MOD MDM: CPT

## 2025-04-27 LAB
ALBUMIN SERPL ELPH-MCNC: 4.2 G/DL — SIGNIFICANT CHANGE UP (ref 3.3–5)
ALP SERPL-CCNC: 72 U/L — SIGNIFICANT CHANGE UP (ref 40–120)
ALT FLD-CCNC: 18 U/L — SIGNIFICANT CHANGE UP (ref 4–33)
ANION GAP SERPL CALC-SCNC: 12 MMOL/L — SIGNIFICANT CHANGE UP (ref 7–14)
AST SERPL-CCNC: 25 U/L — SIGNIFICANT CHANGE UP (ref 4–32)
BASOPHILS # BLD AUTO: 0.07 K/UL — SIGNIFICANT CHANGE UP (ref 0–0.2)
BASOPHILS NFR BLD AUTO: 0.8 % — SIGNIFICANT CHANGE UP (ref 0–2)
BILIRUB SERPL-MCNC: 1 MG/DL — SIGNIFICANT CHANGE UP (ref 0.2–1.2)
BUN SERPL-MCNC: 18 MG/DL — SIGNIFICANT CHANGE UP (ref 7–23)
CALCIUM SERPL-MCNC: 9.1 MG/DL — SIGNIFICANT CHANGE UP (ref 8.4–10.5)
CHLORIDE SERPL-SCNC: 101 MMOL/L — SIGNIFICANT CHANGE UP (ref 98–107)
CO2 SERPL-SCNC: 25 MMOL/L — SIGNIFICANT CHANGE UP (ref 22–31)
CREAT SERPL-MCNC: 0.96 MG/DL — SIGNIFICANT CHANGE UP (ref 0.5–1.3)
D DIMER BLD IA.RAPID-MCNC: <150 NG/ML DDU — SIGNIFICANT CHANGE UP
EGFR: 62 ML/MIN/1.73M2 — SIGNIFICANT CHANGE UP
EGFR: 62 ML/MIN/1.73M2 — SIGNIFICANT CHANGE UP
EOSINOPHIL # BLD AUTO: 0.21 K/UL — SIGNIFICANT CHANGE UP (ref 0–0.5)
EOSINOPHIL NFR BLD AUTO: 2.5 % — SIGNIFICANT CHANGE UP (ref 0–6)
GLUCOSE SERPL-MCNC: 129 MG/DL — HIGH (ref 70–99)
HCT VFR BLD CALC: 39.7 % — SIGNIFICANT CHANGE UP (ref 34.5–45)
HGB BLD-MCNC: 13.2 G/DL — SIGNIFICANT CHANGE UP (ref 11.5–15.5)
IANC: 4.9 K/UL — SIGNIFICANT CHANGE UP (ref 1.8–7.4)
IMM GRANULOCYTES NFR BLD AUTO: 0.2 % — SIGNIFICANT CHANGE UP (ref 0–0.9)
LIDOCAIN IGE QN: 48 U/L — SIGNIFICANT CHANGE UP (ref 7–60)
LYMPHOCYTES # BLD AUTO: 2.77 K/UL — SIGNIFICANT CHANGE UP (ref 1–3.3)
LYMPHOCYTES # BLD AUTO: 32.7 % — SIGNIFICANT CHANGE UP (ref 13–44)
MCHC RBC-ENTMCNC: 32 PG — SIGNIFICANT CHANGE UP (ref 27–34)
MCHC RBC-ENTMCNC: 33.2 G/DL — SIGNIFICANT CHANGE UP (ref 32–36)
MCV RBC AUTO: 96.1 FL — SIGNIFICANT CHANGE UP (ref 80–100)
MONOCYTES # BLD AUTO: 0.49 K/UL — SIGNIFICANT CHANGE UP (ref 0–0.9)
MONOCYTES NFR BLD AUTO: 5.8 % — SIGNIFICANT CHANGE UP (ref 2–14)
NEUTROPHILS # BLD AUTO: 4.9 K/UL — SIGNIFICANT CHANGE UP (ref 1.8–7.4)
NEUTROPHILS NFR BLD AUTO: 58 % — SIGNIFICANT CHANGE UP (ref 43–77)
NRBC # BLD AUTO: 0 K/UL — SIGNIFICANT CHANGE UP (ref 0–0)
NRBC # FLD: 0 K/UL — SIGNIFICANT CHANGE UP (ref 0–0)
NRBC BLD AUTO-RTO: 0 /100 WBCS — SIGNIFICANT CHANGE UP (ref 0–0)
PLATELET # BLD AUTO: 276 K/UL — SIGNIFICANT CHANGE UP (ref 150–400)
POTASSIUM SERPL-MCNC: 3.8 MMOL/L — SIGNIFICANT CHANGE UP (ref 3.5–5.3)
POTASSIUM SERPL-SCNC: 3.8 MMOL/L — SIGNIFICANT CHANGE UP (ref 3.5–5.3)
PROT SERPL-MCNC: 7.3 G/DL — SIGNIFICANT CHANGE UP (ref 6–8.3)
RBC # BLD: 4.13 M/UL — SIGNIFICANT CHANGE UP (ref 3.8–5.2)
RBC # FLD: 13.8 % — SIGNIFICANT CHANGE UP (ref 10.3–14.5)
SODIUM SERPL-SCNC: 138 MMOL/L — SIGNIFICANT CHANGE UP (ref 135–145)
TROPONIN T, HIGH SENSITIVITY RESULT: 6 NG/L — SIGNIFICANT CHANGE UP
WBC # BLD: 8.46 K/UL — SIGNIFICANT CHANGE UP (ref 3.8–10.5)
WBC # FLD AUTO: 8.46 K/UL — SIGNIFICANT CHANGE UP (ref 3.8–10.5)

## 2025-04-27 PROCEDURE — 71046 X-RAY EXAM CHEST 2 VIEWS: CPT | Mod: 26

## 2025-04-27 PROCEDURE — 93010 ELECTROCARDIOGRAM REPORT: CPT

## 2025-04-27 RX ORDER — ACETAMINOPHEN 500 MG/5ML
1000 LIQUID (ML) ORAL ONCE
Refills: 0 | Status: COMPLETED | OUTPATIENT
Start: 2025-04-27 | End: 2025-04-27

## 2025-04-27 RX ADMIN — Medication 400 MILLIGRAM(S): at 01:56

## 2025-04-27 NOTE — ED PROVIDER NOTE - ATTENDING CONTRIBUTION TO CARE
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 Notes pain to lower rib region, worsened with deep inspiration. Denies cough ,fever/chills. NO SOB, difficulty breathing. Pain non-exertional. No direct trauma or heavy lifting. No recent travel, leg swelling, hormones use. Last saw cardiologist 2 years ago with normal stress and echo.  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 Low risk for PE but cannot PERC out due to age, will check d-dimer. Check trop and EKG for evidence of ACS. Check lipase though low suspicion for pancreatitis. Can obtain CXR though no infectious symptoms nor any trauma to suggest PNA or rib fracture. If all negative anticipate likely muscular in nature.

## 2025-04-27 NOTE — ED PROVIDER NOTE - NSFOLLOWUPINSTRUCTIONS_ED_ALL_ED_FT
You were seen in the Emergency Department for chest pain. Your bloodwork, EKG, and xray did not show any emergencies. Please follow up with your cardiologist. For pain you can take 1000mg of tylenol every 6 hours, no more than 4000mg in one day.     Return to the Emergency Department if you experience worsening chest pain, difficulty breathing, passing out, severe abdominal pain, severe nausea or vomiting.

## 2025-04-27 NOTE — ED PROVIDER NOTE - PATIENT PORTAL LINK FT
You can access the FollowMyHealth Patient Portal offered by [facility] by registering at the following website: [email or web address]. By joining [employer]’s FollowMyHealth portal, you will also be able to view your health information using other applications (apps) compatible with our system.

## 2025-04-27 NOTE — ED PROVIDER NOTE - PROGRESS NOTE DETAILS
Tanisha AUQINO PGY1: patient feeling well, discussed return rpecautions, results of imaging and labwork, follow up

## 2025-04-27 NOTE — ED PROVIDER NOTE - CLINICAL SUMMARY MEDICAL DECISION MAKING FREE TEXT BOX
75 year old woman PMH hypothyroidism, jan, DCIS in 2017 s/p radiation on the right side c/b bronchiectasis, some arrythrmia not on meds presenting with left lower chest / abd pain, some LUQ tenderness, worse with deep breath, clear lungs, well appearing, concerning for acs vs PE vs pancreatitis vs gastritis vs PNA. cbc, cmp, trop, lipase, EKG, CXR.

## 2025-04-27 NOTE — ED PROVIDER NOTE - PHYSICAL EXAMINATION
Physical Exam:  Gen: no acute distress, AOx3, nontoxic appearing, able to ambulate without assistance  Head: NCAT  HEENT: EOMI, PEERLA, normal conjunctiva, tongue midline, oral mucosa moist  Lung: CTAB, no respiratory distress, no wheezes/rhonchi/rales B/L, speaking in full sentences  CV:RRR, no murmurs, rubs or gallops  Abd: soft, mild LUQ tenderness, ND, no guarding, no rigidity, no rebound tenderness, no CVA tenderness  MSK: no visible deformities, ROM normal in UE/LE, no neck / back pain, calf tenderness  Neuro: No focal sensory or motor deficits in cranial nerves, upper and lower extremities  Skin: Warm, well perfused, no rash, no leg swelling

## 2025-04-27 NOTE — ED PROVIDER NOTE - OBJECTIVE STATEMENT
75 year old woman PMH hypothyroidism, jan, DCIS in 2017 s/p radiation on the right side c/b bronchiectasis, some arrythrmia not on meds presenting with left lower chest / abd pain. the pain is right under her ribs, worse when she takes a deep breath, is not exertional, no chest pain, denies fevers or chills, denies recent travel, leg swelling, hormones, active cancer, reports one episode of nausea but no vomiting. Denies diarrhea, constipation, dysuria. saw caridologist 2 years ago with normal stress and echo.

## 2025-04-27 NOTE — ED ADULT NURSE NOTE - OBJECTIVE STATEMENT
Pt received to room 7, A&O x 4, ambulatory, pmhx hypothyroidism, coming to ED with complaints of abdominal pain. pt reports intermittent epigastric pain, associated with nausea, denies vomiting, or diarrhea. 20G IV placed to RAC, labs drawn and sent, NSR on monitor, safety maintained, comfort provided, care plan ongoing.

## 2025-05-23 ENCOUNTER — APPOINTMENT (OUTPATIENT)
Dept: PULMONOLOGY | Facility: CLINIC | Age: 75
End: 2025-05-23
Payer: MEDICARE

## 2025-05-23 VITALS — DIASTOLIC BLOOD PRESSURE: 79 MMHG | OXYGEN SATURATION: 97 % | HEART RATE: 74 BPM | SYSTOLIC BLOOD PRESSURE: 131 MMHG

## 2025-05-23 DIAGNOSIS — J47.9 BRONCHIECTASIS, UNCOMPLICATED: ICD-10-CM

## 2025-05-23 DIAGNOSIS — J44.9 CHRONIC OBSTRUCTIVE PULMONARY DISEASE, UNSPECIFIED: ICD-10-CM

## 2025-05-23 PROCEDURE — G2211 COMPLEX E/M VISIT ADD ON: CPT

## 2025-05-23 PROCEDURE — 99213 OFFICE O/P EST LOW 20 MIN: CPT

## 2025-06-02 ENCOUNTER — NON-APPOINTMENT (OUTPATIENT)
Age: 75
End: 2025-06-02

## 2025-06-03 ENCOUNTER — APPOINTMENT (OUTPATIENT)
Dept: PULMONOLOGY | Facility: CLINIC | Age: 75
End: 2025-06-03
Payer: MEDICARE

## 2025-06-03 VITALS — HEART RATE: 83 BPM | OXYGEN SATURATION: 98 % | SYSTOLIC BLOOD PRESSURE: 123 MMHG | DIASTOLIC BLOOD PRESSURE: 70 MMHG

## 2025-06-03 DIAGNOSIS — R09.89 OTHER SPECIFIED SYMPTOMS AND SIGNS INVOLVING THE CIRCULATORY AND RESPIRATORY SYSTEMS: ICD-10-CM

## 2025-06-03 DIAGNOSIS — J47.9 BRONCHIECTASIS, UNCOMPLICATED: ICD-10-CM

## 2025-06-03 DIAGNOSIS — B99.9 BRONCHIECTASIS WITH (ACUTE) EXACERBATION: ICD-10-CM

## 2025-06-03 DIAGNOSIS — J47.1 BRONCHIECTASIS WITH (ACUTE) EXACERBATION: ICD-10-CM

## 2025-06-03 DIAGNOSIS — J06.9 ACUTE UPPER RESPIRATORY INFECTION, UNSPECIFIED: ICD-10-CM

## 2025-06-03 PROCEDURE — 99214 OFFICE O/P EST MOD 30 MIN: CPT

## 2025-06-03 PROCEDURE — G2211 COMPLEX E/M VISIT ADD ON: CPT

## 2025-06-03 RX ORDER — FORMOTEROL FUMARATE DIHYDRATE 20 UG/2ML
20 SOLUTION RESPIRATORY (INHALATION)
Qty: 2 | Refills: 8 | Status: ACTIVE | COMMUNITY
Start: 2025-06-03 | End: 1900-01-01

## 2025-06-03 RX ORDER — METHYLPREDNISOLONE 4 MG/1
4 TABLET ORAL
Qty: 1 | Refills: 0 | Status: ACTIVE | COMMUNITY
Start: 2025-06-03 | End: 1900-01-01

## 2025-06-18 ENCOUNTER — APPOINTMENT (OUTPATIENT)
Dept: PULMONOLOGY | Facility: CLINIC | Age: 75
End: 2025-06-18
Payer: MEDICARE

## 2025-06-18 VITALS — SYSTOLIC BLOOD PRESSURE: 108 MMHG | DIASTOLIC BLOOD PRESSURE: 66 MMHG | OXYGEN SATURATION: 99 % | HEART RATE: 75 BPM

## 2025-06-18 PROCEDURE — 95012 NITRIC OXIDE EXP GAS DETER: CPT

## 2025-06-18 PROCEDURE — 94060 EVALUATION OF WHEEZING: CPT

## 2025-06-18 PROCEDURE — 99214 OFFICE O/P EST MOD 30 MIN: CPT | Mod: 25

## 2025-06-20 ENCOUNTER — TRANSCRIPTION ENCOUNTER (OUTPATIENT)
Age: 75
End: 2025-06-20

## 2025-06-23 ENCOUNTER — APPOINTMENT (OUTPATIENT)
Dept: GASTROENTEROLOGY | Facility: CLINIC | Age: 75
End: 2025-06-23

## 2025-06-23 VITALS
WEIGHT: 130 LBS | HEIGHT: 65 IN | OXYGEN SATURATION: 94 % | BODY MASS INDEX: 21.66 KG/M2 | HEART RATE: 80 BPM | DIASTOLIC BLOOD PRESSURE: 75 MMHG | SYSTOLIC BLOOD PRESSURE: 117 MMHG

## 2025-06-23 PROBLEM — R13.19 ESOPHAGEAL DYSPHAGIA: Status: ACTIVE | Noted: 2025-06-23

## 2025-06-23 PROCEDURE — 99214 OFFICE O/P EST MOD 30 MIN: CPT

## 2025-06-23 PROCEDURE — G2211 COMPLEX E/M VISIT ADD ON: CPT

## 2025-06-23 RX ORDER — HYOSCYAMINE SULFATE 0.12 MG/1
0.12 TABLET SUBLINGUAL 4 TIMES DAILY
Qty: 30 | Refills: 1 | Status: ACTIVE | COMMUNITY
Start: 2025-06-23 | End: 1900-01-01

## 2025-06-27 ENCOUNTER — NON-APPOINTMENT (OUTPATIENT)
Age: 75
End: 2025-06-27

## 2025-06-27 ENCOUNTER — APPOINTMENT (OUTPATIENT)
Dept: DERMATOLOGY | Facility: CLINIC | Age: 75
End: 2025-06-27
Payer: MEDICARE

## 2025-06-27 VITALS — BODY MASS INDEX: 21.99 KG/M2 | WEIGHT: 132 LBS | HEIGHT: 65 IN

## 2025-06-27 PROBLEM — L71.9 ROSACEA: Status: ACTIVE | Noted: 2025-06-27

## 2025-06-27 PROCEDURE — 99213 OFFICE O/P EST LOW 20 MIN: CPT | Mod: 25

## 2025-06-27 PROCEDURE — 17110 DESTRUCTION B9 LES UP TO 14: CPT

## 2025-07-08 ENCOUNTER — APPOINTMENT (OUTPATIENT)
Dept: PEDIATRIC ALLERGY IMMUNOLOGY | Facility: CLINIC | Age: 75
End: 2025-07-08
Payer: MEDICARE

## 2025-07-08 VITALS
BODY MASS INDEX: 22.93 KG/M2 | WEIGHT: 137.8 LBS | SYSTOLIC BLOOD PRESSURE: 134 MMHG | DIASTOLIC BLOOD PRESSURE: 69 MMHG | HEART RATE: 72 BPM

## 2025-07-08 PROBLEM — L50.3 DERMOGRAPHIC URTICARIA: Status: ACTIVE | Noted: 2025-07-08

## 2025-07-08 PROCEDURE — 95018 ALL TSTG PERQ&IQ DRUGS/BIOL: CPT

## 2025-07-08 PROCEDURE — 99214 OFFICE O/P EST MOD 30 MIN: CPT | Mod: 25

## 2025-07-08 RX ORDER — CIPROFLOXACIN HYDROCHLORIDE 250 MG/1
250 TABLET, FILM COATED ORAL
Qty: 2 | Refills: 0 | Status: ACTIVE | COMMUNITY
Start: 2025-07-08 | End: 1900-01-01

## 2025-07-15 ENCOUNTER — OUTPATIENT (OUTPATIENT)
Dept: OUTPATIENT SERVICES | Facility: HOSPITAL | Age: 75
LOS: 1 days | End: 2025-07-15
Payer: MEDICARE

## 2025-07-15 ENCOUNTER — APPOINTMENT (OUTPATIENT)
Dept: ULTRASOUND IMAGING | Facility: IMAGING CENTER | Age: 75
End: 2025-07-15

## 2025-07-15 DIAGNOSIS — N39.0 URINARY TRACT INFECTION, SITE NOT SPECIFIED: ICD-10-CM

## 2025-07-15 DIAGNOSIS — R33.9 RETENTION OF URINE, UNSPECIFIED: ICD-10-CM

## 2025-07-15 DIAGNOSIS — Z90.49 ACQUIRED ABSENCE OF OTHER SPECIFIED PARTS OF DIGESTIVE TRACT: Chronic | ICD-10-CM

## 2025-07-15 DIAGNOSIS — D17.71 BENIGN LIPOMATOUS NEOPLASM OF KIDNEY: ICD-10-CM

## 2025-07-15 PROCEDURE — 76770 US EXAM ABDO BACK WALL COMP: CPT

## 2025-07-15 PROCEDURE — 76770 US EXAM ABDO BACK WALL COMP: CPT | Mod: 26

## 2025-07-18 ENCOUNTER — NON-APPOINTMENT (OUTPATIENT)
Age: 75
End: 2025-07-18

## 2025-07-18 ENCOUNTER — APPOINTMENT (OUTPATIENT)
Dept: UROLOGY | Facility: CLINIC | Age: 75
End: 2025-07-18
Payer: MEDICARE

## 2025-07-18 VITALS — DIASTOLIC BLOOD PRESSURE: 73 MMHG | SYSTOLIC BLOOD PRESSURE: 135 MMHG | HEART RATE: 69 BPM | RESPIRATION RATE: 16 BRPM

## 2025-07-18 PROCEDURE — 99213 OFFICE O/P EST LOW 20 MIN: CPT

## 2025-07-19 LAB
APPEARANCE: CLEAR
BACTERIA: NEGATIVE /HPF
BILIRUBIN URINE: NEGATIVE
BLOOD URINE: NEGATIVE
CAST: 0 /LPF
COLOR: YELLOW
EPITHELIAL CELLS: 1 /HPF
GLUCOSE QUALITATIVE U: NEGATIVE MG/DL
KETONES URINE: NEGATIVE MG/DL
LEUKOCYTE ESTERASE URINE: NEGATIVE
MICROSCOPIC-UA: NORMAL
NITRITE URINE: NEGATIVE
PH URINE: 6.5
PROTEIN URINE: NEGATIVE MG/DL
RED BLOOD CELLS URINE: 0 /HPF
SPECIFIC GRAVITY URINE: 1.01
UROBILINOGEN URINE: 0.2 MG/DL
WHITE BLOOD CELLS URINE: 0 /HPF

## 2025-07-22 LAB — BACTERIA UR CULT: NORMAL

## 2025-08-07 ENCOUNTER — OUTPATIENT (OUTPATIENT)
Dept: OUTPATIENT SERVICES | Facility: HOSPITAL | Age: 75
LOS: 1 days | End: 2025-08-07

## 2025-08-07 ENCOUNTER — APPOINTMENT (OUTPATIENT)
Dept: RADIOLOGY | Facility: HOSPITAL | Age: 75
End: 2025-08-07
Payer: MEDICARE

## 2025-08-07 DIAGNOSIS — Z90.49 ACQUIRED ABSENCE OF OTHER SPECIFIED PARTS OF DIGESTIVE TRACT: Chronic | ICD-10-CM

## 2025-08-07 DIAGNOSIS — R13.19 OTHER DYSPHAGIA: ICD-10-CM

## 2025-08-07 PROCEDURE — 74220 X-RAY XM ESOPHAGUS 1CNTRST: CPT | Mod: 26

## 2025-08-26 ENCOUNTER — RESULT REVIEW (OUTPATIENT)
Age: 75
End: 2025-08-26

## 2025-08-26 ENCOUNTER — APPOINTMENT (OUTPATIENT)
Dept: HEMATOLOGY ONCOLOGY | Facility: CLINIC | Age: 75
End: 2025-08-26
Payer: MEDICARE

## 2025-08-26 VITALS
SYSTOLIC BLOOD PRESSURE: 168 MMHG | HEIGHT: 65 IN | TEMPERATURE: 97.6 F | WEIGHT: 132.94 LBS | RESPIRATION RATE: 16 BRPM | OXYGEN SATURATION: 98 % | HEART RATE: 67 BPM | BODY MASS INDEX: 22.15 KG/M2 | DIASTOLIC BLOOD PRESSURE: 84 MMHG

## 2025-08-26 DIAGNOSIS — I10 ESSENTIAL (PRIMARY) HYPERTENSION: ICD-10-CM

## 2025-08-26 DIAGNOSIS — Z85.828 PERSONAL HISTORY OF OTHER MALIGNANT NEOPLASM OF SKIN: ICD-10-CM

## 2025-08-26 DIAGNOSIS — D47.2 MONOCLONAL GAMMOPATHY: ICD-10-CM

## 2025-08-26 DIAGNOSIS — D80.2 SELECTIVE DEFICIENCY OF IMMUNOGLOBULIN A [IGA]: ICD-10-CM

## 2025-08-26 LAB
ALBUMIN SERPL ELPH-MCNC: 4.3 G/DL
ALP BLD-CCNC: 62 U/L
ALT SERPL-CCNC: 20 U/L
ANION GAP SERPL CALC-SCNC: 12 MMOL/L
AST SERPL-CCNC: 23 U/L
BILIRUB SERPL-MCNC: 1.8 MG/DL
BUN SERPL-MCNC: 17 MG/DL
CALCIUM SERPL-MCNC: 9.3 MG/DL
CHLORIDE SERPL-SCNC: 102 MMOL/L
CO2 SERPL-SCNC: 24 MMOL/L
CREAT SERPL-MCNC: 0.89 MG/DL
EGFRCR SERPLBLD CKD-EPI 2021: 68 ML/MIN/1.73M2
GLUCOSE SERPL-MCNC: 92 MG/DL
POTASSIUM SERPL-SCNC: 4.5 MMOL/L
PROT SERPL-MCNC: 7 G/DL
SODIUM SERPL-SCNC: 138 MMOL/L

## 2025-08-26 PROCEDURE — 99215 OFFICE O/P EST HI 40 MIN: CPT

## 2025-08-26 PROCEDURE — G2211 COMPLEX E/M VISIT ADD ON: CPT

## 2025-09-01 LAB
ALBUMIN MFR SERPL ELPH: 62.5 %
ALBUMIN SERPL-MCNC: 4.4 G/DL
ALBUMIN/GLOB SERPL: 1.7 RATIO
ALPHA1 GLOB MFR SERPL ELPH: 2.9 %
ALPHA1 GLOB SERPL ELPH-MCNC: 0.2 G/DL
ALPHA2 GLOB MFR SERPL ELPH: 8.9 %
ALPHA2 GLOB SERPL ELPH-MCNC: 0.6 G/DL
B-GLOBULIN MFR SERPL ELPH: 7.9 %
B-GLOBULIN SERPL ELPH-MCNC: 0.6 G/DL
DEPRECATED KAPPA LC FREE/LAMBDA SER: 2.5 RATIO
GAMMA GLOB FLD ELPH-MCNC: 1.2 G/DL
GAMMA GLOB MFR SERPL ELPH: 17.8 %
IGA SERPL-MCNC: 70 MG/DL
IGG SERPL-MCNC: 1282 MG/DL
IGM SERPL-MCNC: 34 MG/DL
INTERPRETATION SERPL IEP-IMP: NORMAL
KAPPA LC CSF-MCNC: 1.15 MG/DL
KAPPA LC SERPL-MCNC: 2.87 MG/DL
M PROTEIN MFR SERPL ELPH: 14.1 %
M PROTEIN SPEC IFE-MCNC: NORMAL
MONOCLON BAND OBS SERPL: 1 G/DL
PROT SERPL-MCNC: 7 G/DL
PROT SERPL-MCNC: 7 G/DL

## 2025-09-02 ENCOUNTER — APPOINTMENT (OUTPATIENT)
Dept: PEDIATRIC ALLERGY IMMUNOLOGY | Facility: CLINIC | Age: 75
End: 2025-09-02
Payer: MEDICARE

## 2025-09-02 VITALS
HEIGHT: 65 IN | BODY MASS INDEX: 22.37 KG/M2 | SYSTOLIC BLOOD PRESSURE: 133 MMHG | DIASTOLIC BLOOD PRESSURE: 85 MMHG | HEART RATE: 66 BPM | OXYGEN SATURATION: 96 % | WEIGHT: 134.25 LBS

## 2025-09-02 DIAGNOSIS — L50.3 DERMATOGRAPHIC URTICARIA: ICD-10-CM

## 2025-09-02 DIAGNOSIS — D89.89 OTHER SPECIFIED DISORDERS INVOLVING THE IMMUNE MECHANISM, NOT ELSEWHERE CLASSIFIED: ICD-10-CM

## 2025-09-02 DIAGNOSIS — J47.9 BRONCHIECTASIS, UNCOMPLICATED: ICD-10-CM

## 2025-09-02 DIAGNOSIS — D80.9 IMMUNODEFICIENCY WITH PREDOMINANTLY ANTIBODY DEFECTS, UNSPECIFIED: ICD-10-CM

## 2025-09-02 DIAGNOSIS — Z88.9 ALLERGY STATUS TO UNSPECIFIED DRUGS, MEDICAMENTS AND BIOLOGICAL SUBSTANCES: ICD-10-CM

## 2025-09-02 PROCEDURE — 99214 OFFICE O/P EST MOD 30 MIN: CPT | Mod: 25

## 2025-09-02 PROCEDURE — 95018 ALL TSTG PERQ&IQ DRUGS/BIOL: CPT

## 2025-09-03 ENCOUNTER — OUTPATIENT (OUTPATIENT)
Dept: OUTPATIENT SERVICES | Facility: HOSPITAL | Age: 75
LOS: 1 days | End: 2025-09-03
Payer: MEDICARE

## 2025-09-03 ENCOUNTER — APPOINTMENT (OUTPATIENT)
Dept: MAMMOGRAPHY | Facility: IMAGING CENTER | Age: 75
End: 2025-09-03
Payer: MEDICARE

## 2025-09-03 DIAGNOSIS — Z90.49 ACQUIRED ABSENCE OF OTHER SPECIFIED PARTS OF DIGESTIVE TRACT: Chronic | ICD-10-CM

## 2025-09-03 DIAGNOSIS — Z00.8 ENCOUNTER FOR OTHER GENERAL EXAMINATION: ICD-10-CM

## 2025-09-03 PROCEDURE — 77065 DX MAMMO INCL CAD UNI: CPT | Mod: 26,RT

## 2025-09-03 PROCEDURE — G0279: CPT

## 2025-09-03 PROCEDURE — G0279: CPT | Mod: 26

## 2025-09-03 PROCEDURE — 77065 DX MAMMO INCL CAD UNI: CPT

## 2025-09-19 ENCOUNTER — TRANSCRIPTION ENCOUNTER (OUTPATIENT)
Age: 75
End: 2025-09-19

## 2025-09-19 DIAGNOSIS — R19.7 DIARRHEA, UNSPECIFIED: ICD-10-CM

## 2025-09-19 DIAGNOSIS — Z87.898 PERSONAL HISTORY OF OTHER SPECIFIED CONDITIONS: ICD-10-CM

## 2025-09-24 LAB
GI PCR PANEL: DETECTED
YERSINIA ENTEROCOLITICA: DETECTED

## 2025-09-25 LAB — BACTERIA STL CULT: NORMAL

## 2025-09-26 LAB
CALPROTECTIN FECAL: <5 UG/G
PANCREATIC ELASTASE, FECAL: >800 CD:794062645

## (undated) DEVICE — ELCTR ECG CONDUCTIVE ADHESIVE

## (undated) DEVICE — LUBRICATING JELLY HR ONE SHOT 3G

## (undated) DEVICE — ELCTR GROUNDING PAD ADULT COVIDIEN

## (undated) DEVICE — BIOPSY FORCEP COLD DISP

## (undated) DEVICE — GOWN LG

## (undated) DEVICE — BIOPSY FORCEP RADIAL JAW 4 STANDARD WITH NEEDLE

## (undated) DEVICE — DRSG BANDAID 0.75X3"

## (undated) DEVICE — DRSG 2X2

## (undated) DEVICE — FACESHIELD FULL VISOR

## (undated) DEVICE — BASIN EMESIS 10IN GRADUATED MAUVE

## (undated) DEVICE — CONTAINER FORMALIN 10% 60ML

## (undated) DEVICE — CATH IV SAFE BC 22G X 1" (BLUE)

## (undated) DEVICE — PACK IV START WITH CHG

## (undated) DEVICE — DRSG CURITY GAUZE SPONGE 4 X 4" 12-PLY NON-STERILE

## (undated) DEVICE — LINE BREATHE SAMPLNG

## (undated) DEVICE — SALIVA EJECTOR (BLUE)

## (undated) DEVICE — CONTAINER FORMALIN 80ML YELLOW

## (undated) DEVICE — TUBING MEDI-VAC W MAXIGRIP CONNECTORS 1/4"X6'

## (undated) DEVICE — TUBING IV SET GRAVITY 3Y 100" MACRO

## (undated) DEVICE — TUBING SUCTION NONCONDUCTIVE 6MM X 12FT